# Patient Record
Sex: FEMALE | Race: WHITE | Employment: OTHER | URBAN - METROPOLITAN AREA
[De-identification: names, ages, dates, MRNs, and addresses within clinical notes are randomized per-mention and may not be internally consistent; named-entity substitution may affect disease eponyms.]

---

## 2017-01-03 DIAGNOSIS — E78.5 HYPERLIPIDEMIA, UNSPECIFIED HYPERLIPIDEMIA TYPE: ICD-10-CM

## 2017-01-03 DIAGNOSIS — I49.3 PVC (PREMATURE VENTRICULAR CONTRACTION): ICD-10-CM

## 2017-01-03 DIAGNOSIS — I42.9 CARDIOMYOPATHY (HCC): ICD-10-CM

## 2017-01-03 RX ORDER — MEXILETINE HYDROCHLORIDE 250 MG/1
250 CAPSULE ORAL 2 TIMES DAILY
Qty: 180 CAP | Refills: 1 | Status: SHIPPED | OUTPATIENT
Start: 2017-01-03 | End: 2017-04-12 | Stop reason: SDUPTHER

## 2017-01-03 RX ORDER — OMEGA-3-ACID ETHYL ESTERS 1 G/1
CAPSULE, LIQUID FILLED ORAL
Qty: 90 CAP | Refills: 1 | Status: SHIPPED | OUTPATIENT
Start: 2017-01-03 | End: 2017-03-07 | Stop reason: SDUPTHER

## 2017-01-03 RX ORDER — METOPROLOL SUCCINATE 25 MG/1
25 TABLET, EXTENDED RELEASE ORAL DAILY
Qty: 90 TAB | Refills: 1 | Status: SHIPPED | OUTPATIENT
Start: 2017-01-03 | End: 2017-03-07 | Stop reason: SDUPTHER

## 2017-01-03 RX ORDER — METFORMIN HYDROCHLORIDE 1000 MG/1
1000 TABLET ORAL 2 TIMES DAILY WITH MEALS
Qty: 180 TAB | Refills: 3 | Status: SHIPPED | OUTPATIENT
Start: 2017-01-03 | End: 2017-03-24 | Stop reason: SDUPTHER

## 2017-01-03 RX ORDER — ATORVASTATIN CALCIUM 10 MG/1
10 TABLET, FILM COATED ORAL
Qty: 90 TAB | Refills: 3 | Status: SHIPPED | OUTPATIENT
Start: 2017-01-03 | End: 2017-03-07 | Stop reason: SDUPTHER

## 2017-01-03 RX ORDER — POTASSIUM CHLORIDE 20 MEQ/1
30 TABLET, EXTENDED RELEASE ORAL 2 TIMES DAILY
Qty: 270 TAB | Refills: 3 | Status: SHIPPED | OUTPATIENT
Start: 2017-01-03 | End: 2017-06-22 | Stop reason: DRUGHIGH

## 2017-01-03 NOTE — TELEPHONE ENCOUNTER
Requested Prescriptions     Signed Prescriptions Disp Refills    potassium chloride (KLOR-CON M20) 20 mEq tablet 270 Tab 3     Sig: Take 1.5 Tabs by mouth two (2) times a day. Authorizing Provider: Ward Patricia     Ordering User: Lui Rodriguez atorvastatin (LIPITOR) 10 mg tablet 90 Tab 3     Sig: Take 1 Tab by mouth nightly. Authorizing Provider: Ward Patricia     Ordering User: Lui Rodriguez metFORMIN (GLUCOPHAGE) 1,000 mg tablet 180 Tab 3     Sig: Take 1 Tab by mouth two (2) times daily (with meals).      Authorizing Provider: Ward Patricia     Ordering User: Princess Allen

## 2017-01-03 NOTE — TELEPHONE ENCOUNTER
Requested Prescriptions     Signed Prescriptions Disp Refills    mexiletine (MEXITIL) 250 mg capsule 180 Cap 1     Sig: Take 1 Cap by mouth two (2) times a day. Authorizing Provider: Mary Mujica     Ordering User: Marce Portillo metoprolol succinate (TOPROL-XL) 25 mg XL tablet 90 Tab 1     Sig: Take 1 Tab by mouth daily.      Authorizing Provider: Mary Mujica     Ordering User: Cristy Lang

## 2017-01-03 NOTE — TELEPHONE ENCOUNTER
Requested Prescriptions     Signed Prescriptions Disp Refills    omega-3 acid ethyl esters (LOVAZA) 1 gram capsule 90 Cap 1     Sig: Three capsules by mouth daily.      Authorizing Provider: Reyes Mann     Ordering User: Julia Abad

## 2017-02-09 ENCOUNTER — OFFICE VISIT (OUTPATIENT)
Dept: CARDIOLOGY CLINIC | Age: 67
End: 2017-02-09

## 2017-02-09 VITALS
HEART RATE: 88 BPM | RESPIRATION RATE: 16 BRPM | HEIGHT: 64 IN | SYSTOLIC BLOOD PRESSURE: 144 MMHG | OXYGEN SATURATION: 98 % | DIASTOLIC BLOOD PRESSURE: 84 MMHG | BODY MASS INDEX: 29.5 KG/M2 | WEIGHT: 172.8 LBS

## 2017-02-09 DIAGNOSIS — E55.9 VITAMIN D DEFICIENCY: ICD-10-CM

## 2017-02-09 DIAGNOSIS — I10 ESSENTIAL HYPERTENSION: ICD-10-CM

## 2017-02-09 DIAGNOSIS — E78.2 ELEVATED TRIGLYCERIDES WITH HIGH CHOLESTEROL: ICD-10-CM

## 2017-02-09 DIAGNOSIS — I42.9 CARDIOMYOPATHY (HCC): ICD-10-CM

## 2017-02-09 DIAGNOSIS — R42 DIZZINESS: ICD-10-CM

## 2017-02-09 DIAGNOSIS — E88.81 INSULIN RESISTANCE: ICD-10-CM

## 2017-02-09 DIAGNOSIS — E78.2 MIXED HYPERLIPIDEMIA WITH APOLIPOPROTEIN E2 VARIANT: ICD-10-CM

## 2017-02-09 DIAGNOSIS — I49.3 PVC (PREMATURE VENTRICULAR CONTRACTION): Primary | ICD-10-CM

## 2017-02-09 RX ORDER — FENOFIBRATE 145 MG/1
145 TABLET, COATED ORAL DAILY
Qty: 90 TAB | Refills: 3 | Status: SHIPPED | OUTPATIENT
Start: 2017-02-09 | End: 2017-03-24 | Stop reason: SDUPTHER

## 2017-02-09 RX ORDER — LOSARTAN POTASSIUM 100 MG/1
100 TABLET ORAL
Qty: 90 TAB | Refills: 3 | Status: SHIPPED | OUTPATIENT
Start: 2017-02-09 | End: 2017-03-24 | Stop reason: SDUPTHER

## 2017-02-09 NOTE — PATIENT INSTRUCTIONS
I want you to check the TYPE of estrogen cream you have. If it is estradiol, then fine! Use it as directed. If it is synthetic (\"conjugated equine estrogen,\" for example), please call Dr. Alexys Juarez and ask her for bioidentical (estradiol) cream.    You might think about doing some yoga. I really like \"Ageless Yoga,\" which is a DVD series (you can get it on [employer]). Also, maybe give yourself a break and think about getting on your treadmill for 5 minutes a day. Just 5 minutes. Do this every day for 3 weeks and see how you feel. Please increase your Vitamin D to double what you're currently taking. My original recommendation was for 4000 daily in winter and spring, and 2000 daily in summer and fall. I am increasing your losartan. Please take TWO of your current pills at bedtime until you run out. Then, start your new prescription. When you use your new prescription, you will take ONE pill at bedtime.

## 2017-02-09 NOTE — PROGRESS NOTES
Stella Chand  is a 78 yo woman, last seen by me 8/16, and by Dr. Lauren Snow 9/16. She returns today for management of dyslipidemia. She had previously seen Dr. Antolin Blackwell for management of nonischemic dilated cardiomyopathy complicated by symptomatic PVCs and orthostatic dizziness. She drinks copious amounts of water and minimal caffeine. No dizziness. There is no complaint of chest pain, shortness of breath or palpitations. She reports no palpitations. Dr. Lauren Snow will potentially be discontinuing her mexiletine in the future. Ms. Vipul Guidry is now using a treadmill 3-4 days per week; 30-45 minutes each session. Diet is primarily healthy. Likes bread, but \"is trying to watch it. \"    At last visit, we discontinued her fenofibrate. Allergies   Allergen Reactions    Sulfa (Sulfonamide Antibiotics) Hives     Current Outpatient Prescriptions   Medication Sig Dispense Refill    omega-3 acid ethyl esters (LOVAZA) 1 gram capsule Three capsules by mouth daily. 90 Cap 1    mexiletine (MEXITIL) 250 mg capsule Take 1 Cap by mouth two (2) times a day. 180 Cap 1    metoprolol succinate (TOPROL-XL) 25 mg XL tablet Take 1 Tab by mouth daily. 90 Tab 1    potassium chloride (KLOR-CON M20) 20 mEq tablet Take 1.5 Tabs by mouth two (2) times a day. 270 Tab 3    atorvastatin (LIPITOR) 10 mg tablet Take 1 Tab by mouth nightly. 90 Tab 3    metFORMIN (GLUCOPHAGE) 1,000 mg tablet Take 1 Tab by mouth two (2) times daily (with meals). 180 Tab 3    losartan (COZAAR) 50 mg tablet Take 50 mg by mouth nightly. Indications: HYPERTENSION      aspirin delayed-release 81 mg tablet Take 162 mg by mouth nightly.  levothyroxine (SYNTHROID) 137 mcg tablet Take 137 mcg by mouth Daily (before breakfast).  cholecalciferol, VITAMIN D3, (VITAMIN D3) 5,000 unit tab tablet Take 5,000 Units by mouth daily.  folic acid (FOLVITE) 1 mg tablet Take 1 mg by mouth nightly.       sertraline (ZOLOFT) 100 mg tablet Take 100 mg by mouth daily.  raloxifene (EVISTA) 60 mg tablet Take 60 mg by mouth daily.  CYANOCOBALAMIN (VITAMIN B-12 IJ) by Injection route every fourteen (14) days. Social History    Smoking status: Never Smoker     Alcohol Use: Rarely     Past Medical History   Diagnosis Date    Autonomic dysfunction 12/4/98     tilt test with marked heart rate variablility and drop in BP without hemodynamic collapse    Cancer ([facility] 1997     left breast lumpectomy , Rx RT    Diabetes ([facility]      pre-diabetic, on metformin    Dilated cardiomyopathy ([facility] 2/8/06     nonischemic    Elevated triglycerides with high cholesterol 11/21/2014    Hypercholesterolemia     Hypothyroidism 1994     Dr. Marina Lopez.    Ill-defined condition      anxiety    Ill-defined condition      high cholesterol    Insulin resistance 11/21/2014    Mixed hyperlipidemia with apolipoprotein E2 variant 11/21/2014    Nausea & vomiting     Osteoarthritis      diffuse. Dr. Stoner An Other and unspecified hyperlipidemia 12/21/2010    Psychiatric disorder      anxiety    PVC's 1994    Sleep apnea      Not wearing CPAP    Vitamin D deficiency 11/21/2014     Cardiac testing:  Echo 7/27/07- Ef 40-45%, mild MR, mild TR  Echo 2/1/11- Ef 50%, mild MR, Mild TR  Echo 10/10/12- EF 50-55%, mild LAE, no significant changes when compared to previous study  Echo 10/19/13-EF 55 %,grade 1 diastolic dysfunction, mild LAE  LE artery duplex 9/2/14- Negative for DVT  Echo 11/21/14-EF 55 %, grade 1 diastolic dysfunction, mild LAE, mild MR, mild TR  Echo- 10/3/16: EF 60-65%, mild TR. PASP 29 mmHg. Visit Vitals    /84 (BP 1 Location: Right arm, BP Patient Position: Sitting)    Pulse 88    Resp 16    Ht 5' 4\" (1.626 m)    Wt 172 lb 12.8 oz (78.4 kg)    SpO2 98%    BMI 29.66 kg/m2     Extended / Orthostatic Vitals:         Wt Readings from Last 3 Encounters:   02/09/17 172 lb 12.8 oz (78.4 kg)   10/14/16 168 lb (76.2 kg)   09/29/16 170 lb 4.8 oz (77.2 kg)     Neck:  Supple, with no JVD. No bruits or thyroid abnl. Chest:  Clear to auscultation. CV:  Heart rate is regular with nondisplaced PMI, no appreciable murmur or gallop. Abd:  No tenderness, soft, obese. Ext:   No edema. Labs:  LDL-P 1121, Trig 304, sd LDL-P 850, LP-IR 73, HgA1C 5.8%, Vit. D 30.7    EKG: Sinus at 82 bpm, normal    Encounter Diagnoses   Name Primary?  PVC (premature ventricular contraction) Yes    Cardiomyopathy (HCC)     Dizziness     Mixed hyperlipidemia with apolipoprotein E2 variant     Elevated triglycerides with high cholesterol     Insulin resistance     Vitamin D deficiency     Essential hypertension      Ms. Jenelle Siddiqi is experiencing no palpitations; continues on metoprolol and mexiletine. Will defer to Dr. Za Hood regarding discontinuing mexiletine. In the meantime, her blood pressure is not well-controlled, so will Increase losartan to 100 mg qhs. Ms. Jenelle Siddiqi' lipids are significantly worse in the absence of fenofibrate, so will restart fenofibrate 145 mg daily. Continue atorvastatin and Lovaza. Insulin resistance is stable with metformin, but patient urged to begin a regular exercise program, as much to cope with stress as to improve insulin sensitivity. Recommended she try \"Ageless Yoga\" and short intervals on her treadmill at home daily. Vitamin D level is low; she was again advised to take 4000 international units daily in winter and spring and 2000 daily in summer and fall. Follow-up Disposition:  Return in about 6 months (around 8/9/2017).   Labs prior    Shane Orona, ANP

## 2017-02-09 NOTE — PROGRESS NOTES
Visit Vitals    /84 (BP 1 Location: Right arm, BP Patient Position: Sitting)    Pulse 88    Resp 16    Ht 5' 4\" (1.626 m)    Wt 172 lb 12.8 oz (78.4 kg)    SpO2 98%    BMI 29.66 kg/m2

## 2017-02-09 NOTE — MR AVS SNAPSHOT
Visit Information Date & Time Provider Department Dept. Phone Encounter #  
 2/9/2017  9:00 AM Varsha Richards NP [facility] Rickie Khalil [phone] [id number] Follow-up Instructions Return in about 6 months (around 8/9/2017). Your Appointments 4/24/2017 10:00 AM  
ESTABLISHED PATIENT with Pranav Musa MD  
[facility] Appt Note: annual  
 [address] [facility] [address] [facility]  
[phone]  
  
   
 [address] [facility] [address] [place] [address]  
  
    
 9/25/2017  9:00 AM  
ESTABLISHED PATIENT with Pranav Musa MD  
[facility] ([facility]) Appt Note: 1 yr fu appt  sll [address] [facility] [address] [facility]  
[phone] Upcoming Health Maintenance Date Due Hepatitis C Screening 1950 DTaP/Tdap/Td series (1 - Tdap) 3/9/1971 BREAST CANCER SCRN MAMMOGRAM 3/9/2000 FOBT Q 1 YEAR AGE 50-75 3/9/2000 ZOSTER VACCINE AGE 60> 3/9/2010 GLAUCOMA SCREENING Q2Y 3/9/2015 Pneumococcal 65+ Low/Medium Risk (1 of 2 - PCV13) 3/9/2015 MEDICARE YEARLY EXAM 3/9/2015 INFLUENZA AGE 9 TO ADULT 8/1/2016 Allergies as of 2/9/2017  Review Complete On: 2/9/2017 By: Varsha Richards NP Severity Noted Reaction Type Reactions Sulfa (Sulfonamide Antibiotics)  07/16/2010    Hives Current Immunizations  Never Reviewed No immunizations on file. Not reviewed this visit You Were Diagnosed With   
  
 Codes Comments PVC (premature ventricular contraction)    -  Primary ICD-10-CM: I49.3 ICD-9-CM: 427.69 Cardiomyopathy ([facility]     ICD-10-CM: I42.9 ICD-9-CM: 425.4 Dizziness     ICD-10-CM: O32 ICD-9-CM: 780.4 Mixed hyperlipidemia with apolipoprotein E2 variant     ICD-10-CM: E78.2 ICD-9-CM: 272.2 Elevated triglycerides with high cholesterol     ICD-10-CM: E78.2 ICD-9-CM: 272.2 Insulin resistance     ICD-10-CM: P17.69 ICD-9-CM: 277.7 Vitamin D deficiency     ICD-10-CM: E55.9 ICD-9-CM: 268.9 Essential hypertension     ICD-10-CM: I10 
ICD-9-CM: 401.9 Vitals BP Pulse Resp Height(growth percentile) Weight(growth percentile) SpO2  
 144/84 (BP 1 Location: Right arm, BP Patient Position: Sitting) 88 16 5' 4\" (1.626 m) 172 lb 12.8 oz (78.4 kg) 98% BMI OB Status Smoking Status 29.66 kg/m2 Hysterectomy Never Smoker Vitals History BMI and BSA Data Body Mass Index Body Surface Area  
 29.66 kg/m 2 1.88 m 2 Preferred Pharmacy Pharmacy Name Phone  [address] [phone] Your Updated Medication List  
  
   
This list is accurate as of: 2/9/17 10:10 AM.  Always use your most recent med list.  
  
  
  
  
 aspirin delayed-release 81 mg tablet Take 162 mg by mouth nightly. atorvastatin 10 mg tablet Commonly known as:  LIPITOR Take 1 Tab by mouth nightly. cholecalciferol (VITAMIN D3) 5,000 unit Tab tablet Commonly known as:  VITAMIN D3 Take 5,000 Units by mouth daily. EVISTA 60 mg tablet Generic drug:  raloxifene Take 60 mg by mouth daily. fenofibrate nanocrystallized 145 mg tablet Commonly known as:  [employer] Take 1 Tab by mouth daily. folic acid 1 mg tablet Commonly known as:  [employer] Take 1 mg by mouth nightly. levothyroxine 137 mcg tablet Commonly known as:  SYNTHROID Take 137 mcg by mouth Daily (before breakfast). losartan 100 mg tablet Commonly known as:  COZAAR Take 1 Tab by mouth nightly. Indications: hypertension  
  
 metFORMIN 1,000 mg tablet Commonly known as:  GLUCOPHAGE Take 1 Tab by mouth two (2) times daily (with meals). metoprolol succinate 25 mg XL tablet Commonly known as:  TOPROL-XL Take 1 Tab by mouth daily. mexiletine 250 mg capsule Commonly known as:  MEXITIL Take 1 Cap by mouth two (2) times a day. omega-3 acid ethyl esters 1 gram capsule Commonly known as:  Renetta Keys Three capsules by mouth daily. potassium chloride 20 mEq tablet Commonly known as:  KLOR-CON M20 Take 1.5 Tabs by mouth two (2) times a day. VITAMIN B-12 INJECTION  
by Injection route every fourteen (14) days. ZOLOFT 100 mg tablet Generic drug:  sertraline Take 100 mg by mouth daily. Prescriptions Sent to Pharmacy Refills  
 fenofibrate nanocrystallized (TRICOR) 145 mg tablet 3 Sig: Take 1 Tab by mouth daily. Class: Normal  
 Pharmacy: [place] [address] Ph #: [phone] Route: Oral  
 losartan (COZAAR) 100 mg tablet 3 Sig: Take 1 Tab by mouth nightly. Indications: hypertension Class: Normal  
 Pharmacy: [place] [address] Ph #: [phone] Route: Oral  
  
We Performed the Following AMB POC EKG ROUTINE W/ 12 LEADS, INTER & REP [22958 CPT(R)] HEMOGLOBIN A1C W/O EAG [37333 CPT(R)] METABOLIC PANEL, COMPREHENSIVE [82001 CPT(R)] NMR LIPOPROFILE O466106 CPT(R)] Follow-up Instructions Return in about 6 months (around 8/9/2017). To-Do List   
 07/09/2017 Lab:  VITAMIN D, 25 HYDROXY Patient Instructions I want you to check the TYPE of estrogen cream you have. If it is estradiol, then fine! Use it as directed. If it is synthetic (\"conjugated equine estrogen,\" for example), please call Dr. Brynn Ramos and ask her for bioidentical (estradiol) cream. 
 
You might think about doing some yoga. I really like \"Ageless Yoga,\" which is a DVD series (you can get it on [employer]). Also, maybe give yourself a break and think about getting on your treadmill for 5 minutes a day. Just 5 minutes. Do this every day for 3 weeks and see how you feel. Please increase your Vitamin D to double what you're currently taking.  My original recommendation was for 4000 daily in winter and spring, and 2000 daily in summer and fall. I am increasing your losartan. Please take TWO of your current pills at bedtime until you run out. Then, start your new prescription. When you use your new prescription, you will take ONE pill at bedtime. Introducing [facility]! Dear Darren Stewart: Thank you for requesting a Swogo account. Our records indicate that you already have an active Swogo account. You can access your account anytime at [email or web address] Did you know that you can access your hospital and ER discharge instructions at any time in Swogo? You can also review all of your test results from your hospital stay or ER visit. Additional Information If you have questions, please visit the Frequently Asked Questions section of the Swogo website at [email or web address] Remember, Swogo is NOT to be used for urgent needs. For medical emergencies, dial 911. Now available from your iPhone and Android! Please provide this summary of care documentation to your next provider. Your primary care clinician is listed as Sol Weir. If you have any questions after today's visit, please call [phone].

## 2017-03-07 DIAGNOSIS — E78.5 HYPERLIPIDEMIA, UNSPECIFIED HYPERLIPIDEMIA TYPE: ICD-10-CM

## 2017-03-07 DIAGNOSIS — I42.9 CARDIOMYOPATHY (HCC): ICD-10-CM

## 2017-03-07 DIAGNOSIS — I49.3 PVC (PREMATURE VENTRICULAR CONTRACTION): ICD-10-CM

## 2017-03-07 RX ORDER — METOPROLOL SUCCINATE 25 MG/1
25 TABLET, EXTENDED RELEASE ORAL DAILY
Qty: 90 TAB | Refills: 1 | Status: SHIPPED | OUTPATIENT
Start: 2017-03-07 | End: 2017-03-28 | Stop reason: SDUPTHER

## 2017-03-07 RX ORDER — OMEGA-3-ACID ETHYL ESTERS 1 G/1
CAPSULE, LIQUID FILLED ORAL
Qty: 90 CAP | Refills: 1 | Status: SHIPPED | OUTPATIENT
Start: 2017-03-07 | End: 2017-04-12 | Stop reason: SDUPTHER

## 2017-03-07 RX ORDER — ATORVASTATIN CALCIUM 10 MG/1
10 TABLET, FILM COATED ORAL
Qty: 90 TAB | Refills: 1 | Status: SHIPPED | OUTPATIENT
Start: 2017-03-07 | End: 2017-03-24 | Stop reason: SDUPTHER

## 2017-03-07 NOTE — TELEPHONE ENCOUNTER
From: Percy Cisneros  To:  Lucille Daly MD  Sent: 3/7/2017 10:13 AM EST  Subject: Medication Renewal Request    Original authorizing provider: Lucille Daly MD    Percy Lakhani would like a refill of the following medications:  metoprolol succinate (TOPROL-XL) 25 mg XL tablet Lucille Daly MD]    Preferred pharmacy: [address] [place]    Comment:      Medication renewals requested in this message routed to other providers:  omega-3 acid ethyl esters (LOVAZA) 1 gram capsule [Aylin Garcia NP]  atorvastatin (LIPITOR) 10 mg tablet [Aylin Garcia NP]  fenofibrate nanocrystallized (TRICOR) 145 mg tablet [Aylin Garcia NP]

## 2017-03-08 ENCOUNTER — HOSPITAL ENCOUNTER (OUTPATIENT)
Dept: GENERAL RADIOLOGY | Age: 67
Discharge: HOME | End: 2017-03-08
Attending: SPECIALIST
Payer: MEDICARE

## 2017-03-08 DIAGNOSIS — K58.9 IBS (IRRITABLE BOWEL SYNDROME): ICD-10-CM

## 2017-03-08 DIAGNOSIS — R19.7 DIARRHEA, UNSPECIFIED: ICD-10-CM

## 2017-03-08 PROCEDURE — 74250 X-RAY XM SM INT 1CNTRST STD: CPT

## 2017-03-24 DIAGNOSIS — I49.3 PVC (PREMATURE VENTRICULAR CONTRACTION): ICD-10-CM

## 2017-03-24 DIAGNOSIS — E55.9 VITAMIN D DEFICIENCY: ICD-10-CM

## 2017-03-24 DIAGNOSIS — E88.81 INSULIN RESISTANCE: ICD-10-CM

## 2017-03-24 DIAGNOSIS — I42.9 CARDIOMYOPATHY (HCC): ICD-10-CM

## 2017-03-24 DIAGNOSIS — E78.2 MIXED HYPERLIPIDEMIA WITH APOLIPOPROTEIN E2 VARIANT: ICD-10-CM

## 2017-03-24 DIAGNOSIS — I10 ESSENTIAL HYPERTENSION: ICD-10-CM

## 2017-03-24 DIAGNOSIS — R42 DIZZINESS: ICD-10-CM

## 2017-03-24 DIAGNOSIS — E78.2 ELEVATED TRIGLYCERIDES WITH HIGH CHOLESTEROL: ICD-10-CM

## 2017-03-24 DIAGNOSIS — E78.5 HYPERLIPIDEMIA, UNSPECIFIED HYPERLIPIDEMIA TYPE: ICD-10-CM

## 2017-03-24 RX ORDER — METOPROLOL SUCCINATE 25 MG/1
25 TABLET, EXTENDED RELEASE ORAL DAILY
Qty: 90 TAB | Refills: 1 | Status: CANCELLED | OUTPATIENT
Start: 2017-03-24

## 2017-03-27 RX ORDER — FENOFIBRATE 145 MG/1
145 TABLET, COATED ORAL DAILY
Qty: 90 TAB | Refills: 3 | Status: SHIPPED | OUTPATIENT
Start: 2017-03-27 | End: 2017-04-12 | Stop reason: SDUPTHER

## 2017-03-27 RX ORDER — METFORMIN HYDROCHLORIDE 1000 MG/1
1000 TABLET ORAL 2 TIMES DAILY WITH MEALS
Qty: 180 TAB | Refills: 3 | Status: SHIPPED | OUTPATIENT
Start: 2017-03-27 | End: 2017-04-12 | Stop reason: SDUPTHER

## 2017-03-27 RX ORDER — LOSARTAN POTASSIUM 100 MG/1
100 TABLET ORAL
Qty: 90 TAB | Refills: 3 | Status: SHIPPED | OUTPATIENT
Start: 2017-03-27 | End: 2017-04-12 | Stop reason: SDUPTHER

## 2017-03-27 NOTE — TELEPHONE ENCOUNTER
From: Percy Cisneros  To:  Petra Ferrari NP  Sent: 3/24/2017 9:01 PM EDT  Subject: Medication Renewal Request    Original authorizing provider: LUIS Pruitt Batsheva Strickland would like a refill of the following medications:  metFORMIN (GLUCOPHAGE) 1,000 mg tablet [Aylin Garcia NP]  fenofibrate nanocrystallized (TRICOR) 145 mg tablet [Aylin Garcia NP]  losartan (COZAAR) 100 mg tablet [Aylin Garcia NP]    Preferred pharmacy: [address]    Comment:      Medication renewals requested in this message routed to other providers:  metoprolol succinate (TOPROL-XL) 25 mg XL tablet Costa Adams MD]  atorvastatin (LIPITOR) 10 mg tablet Costa Adams MD]

## 2017-03-28 RX ORDER — ATORVASTATIN CALCIUM 10 MG/1
10 TABLET, FILM COATED ORAL
Qty: 90 TAB | Refills: 1 | Status: SHIPPED | OUTPATIENT
Start: 2017-03-28 | End: 2017-04-12 | Stop reason: SDUPTHER

## 2017-03-28 RX ORDER — METOPROLOL SUCCINATE 25 MG/1
25 TABLET, EXTENDED RELEASE ORAL DAILY
Qty: 90 TAB | Refills: 1 | Status: SHIPPED | OUTPATIENT
Start: 2017-03-28 | End: 2017-04-12 | Stop reason: SDUPTHER

## 2017-03-28 NOTE — TELEPHONE ENCOUNTER
From: Percy Cisneros  To:  Tha Manriquez MD  Sent: 3/24/2017 9:01 PM EDT  Subject: Medication Renewal Request    Original authorizing provider: Tha Manriquez MD Dedtia Swain would like a refill of the following medications:  metoprolol succinate (TOPROL-XL) 25 mg XL tablet Tha Manriquez MD]  atorvastatin (LIPITOR) 10 mg tablet Tha Manriquez MD]    Preferred pharmacy: [address]    Comment:      Medication renewals requested in this message routed to other providers:  metFORMIN (GLUCOPHAGE) 1,000 mg tablet [Aylin Garcia NP]  fenofibrate nanocrystallized (TRICOR) 145 mg tablet [Aylin Garcia NP]  losartan (COZAAR) 100 mg tablet [Aylin Garcia NP]

## 2017-04-12 DIAGNOSIS — I49.3 PVC (PREMATURE VENTRICULAR CONTRACTION): ICD-10-CM

## 2017-04-12 DIAGNOSIS — E78.2 MIXED HYPERLIPIDEMIA WITH APOLIPOPROTEIN E2 VARIANT: ICD-10-CM

## 2017-04-12 DIAGNOSIS — E78.5 HYPERLIPIDEMIA, UNSPECIFIED HYPERLIPIDEMIA TYPE: ICD-10-CM

## 2017-04-12 DIAGNOSIS — E88.81 INSULIN RESISTANCE: ICD-10-CM

## 2017-04-12 DIAGNOSIS — I10 ESSENTIAL HYPERTENSION: ICD-10-CM

## 2017-04-12 DIAGNOSIS — I42.9 CARDIOMYOPATHY (HCC): ICD-10-CM

## 2017-04-12 DIAGNOSIS — E78.2 ELEVATED TRIGLYCERIDES WITH HIGH CHOLESTEROL: ICD-10-CM

## 2017-04-12 DIAGNOSIS — R42 DIZZINESS: ICD-10-CM

## 2017-04-12 DIAGNOSIS — E55.9 VITAMIN D DEFICIENCY: ICD-10-CM

## 2017-04-12 RX ORDER — ATORVASTATIN CALCIUM 10 MG/1
10 TABLET, FILM COATED ORAL
Qty: 90 TAB | Refills: 1 | Status: SHIPPED | OUTPATIENT
Start: 2017-04-12 | End: 2017-05-30 | Stop reason: SDUPTHER

## 2017-04-12 RX ORDER — MEXILETINE HYDROCHLORIDE 250 MG/1
250 CAPSULE ORAL 2 TIMES DAILY
Qty: 180 CAP | Refills: 1 | Status: SHIPPED | OUTPATIENT
Start: 2017-04-12 | End: 2017-05-30 | Stop reason: SDUPTHER

## 2017-04-12 RX ORDER — METFORMIN HYDROCHLORIDE 1000 MG/1
1000 TABLET ORAL 2 TIMES DAILY WITH MEALS
Qty: 180 TAB | Refills: 3 | Status: SHIPPED | OUTPATIENT
Start: 2017-04-12 | End: 2017-05-03 | Stop reason: SDUPTHER

## 2017-04-12 RX ORDER — LOSARTAN POTASSIUM 100 MG/1
100 TABLET ORAL
Qty: 90 TAB | Refills: 3 | Status: SHIPPED | OUTPATIENT
Start: 2017-04-12 | End: 2017-05-03 | Stop reason: SDUPTHER

## 2017-04-12 RX ORDER — METOPROLOL SUCCINATE 25 MG/1
25 TABLET, EXTENDED RELEASE ORAL DAILY
Qty: 90 TAB | Refills: 1 | Status: SHIPPED | OUTPATIENT
Start: 2017-04-12 | End: 2017-04-26 | Stop reason: SDUPTHER

## 2017-04-12 RX ORDER — OMEGA-3-ACID ETHYL ESTERS 1 G/1
CAPSULE, LIQUID FILLED ORAL
Qty: 90 CAP | Refills: 1 | Status: SHIPPED | OUTPATIENT
Start: 2017-04-12 | End: 2018-03-26

## 2017-04-12 RX ORDER — FENOFIBRATE 145 MG/1
145 TABLET, COATED ORAL DAILY
Qty: 90 TAB | Refills: 3 | Status: SHIPPED | OUTPATIENT
Start: 2017-04-12 | End: 2017-06-20 | Stop reason: SDUPTHER

## 2017-04-12 NOTE — TELEPHONE ENCOUNTER
Requested Prescriptions     Signed Prescriptions Disp Refills    metFORMIN (GLUCOPHAGE) 1,000 mg tablet 180 Tab 3     Sig: Take 1 Tab by mouth two (2) times daily (with meals). Authorizing Provider: Elysia Winchester     Ordering User: Keyshawn Reaves fenofibrate nanocrystallized (TRICOR) 145 mg tablet 90 Tab 3     Sig: Take 1 Tab by mouth daily. Authorizing Provider: Elysia Winchester     Ordering User: Roxi Segura    losartan (COZAAR) 100 mg tablet 90 Tab 3     Sig: Take 1 Tab by mouth nightly.  Indications: hypertension     Authorizing Provider: Elysia Doty User: Roxi Segura

## 2017-04-12 NOTE — TELEPHONE ENCOUNTER
Requested Prescriptions     Signed Prescriptions Disp Refills    mexiletine (MEXITIL) 250 mg capsule 180 Cap 1     Sig: Take 1 Cap by mouth two (2) times a day. Authorizing Provider: Viri Chopra     Ordering User: Chantelle Aceves    omega-3 acid ethyl esters (LOVAZA) 1 gram capsule 90 Cap 1     Sig: Three capsules by mouth daily. Authorizing Provider: Viri Chopra     Ordering User: Chantelle Aceves    atorvastatin (LIPITOR) 10 mg tablet 90 Tab 1     Sig: Take 1 Tab by mouth nightly. Authorizing Provider: Viri Chopra     Ordering User: Ludin Martinez metoprolol succinate (TOPROL-XL) 25 mg XL tablet 90 Tab 1     Sig: Take 1 Tab by mouth daily.      Authorizing Provider: Viri Chopra     Ordering User: Chantelle Aceves

## 2017-04-26 DIAGNOSIS — I42.8 OTHER CARDIOMYOPATHY (HCC): ICD-10-CM

## 2017-04-26 DIAGNOSIS — I49.3 PVC (PREMATURE VENTRICULAR CONTRACTION): ICD-10-CM

## 2017-04-26 RX ORDER — METOPROLOL SUCCINATE 25 MG/1
25 TABLET, EXTENDED RELEASE ORAL DAILY
Qty: 90 TAB | Refills: 1 | Status: SHIPPED | OUTPATIENT
Start: 2017-04-26 | End: 2017-05-30 | Stop reason: SDUPTHER

## 2017-05-03 DIAGNOSIS — E78.2 ELEVATED TRIGLYCERIDES WITH HIGH CHOLESTEROL: ICD-10-CM

## 2017-05-03 DIAGNOSIS — I25.5 ISCHEMIC CARDIOMYOPATHY: ICD-10-CM

## 2017-05-03 DIAGNOSIS — E78.2 MIXED HYPERLIPIDEMIA WITH APOLIPOPROTEIN E2 VARIANT: ICD-10-CM

## 2017-05-03 DIAGNOSIS — I49.3 PVC (PREMATURE VENTRICULAR CONTRACTION): ICD-10-CM

## 2017-05-03 DIAGNOSIS — E55.9 VITAMIN D DEFICIENCY: ICD-10-CM

## 2017-05-03 DIAGNOSIS — R42 DIZZINESS: ICD-10-CM

## 2017-05-03 DIAGNOSIS — E88.81 INSULIN RESISTANCE: ICD-10-CM

## 2017-05-03 DIAGNOSIS — I10 ESSENTIAL HYPERTENSION: ICD-10-CM

## 2017-05-03 RX ORDER — METFORMIN HYDROCHLORIDE 1000 MG/1
1000 TABLET ORAL 2 TIMES DAILY WITH MEALS
Qty: 180 TAB | Refills: 3 | Status: SHIPPED | OUTPATIENT
Start: 2017-05-03 | End: 2017-05-05 | Stop reason: SDUPTHER

## 2017-05-03 RX ORDER — LOSARTAN POTASSIUM 100 MG/1
100 TABLET ORAL
Qty: 90 TAB | Refills: 3 | Status: SHIPPED | OUTPATIENT
Start: 2017-05-03 | End: 2017-05-05 | Stop reason: DRUGHIGH

## 2017-05-05 RX ORDER — METFORMIN HYDROCHLORIDE 1000 MG/1
1000 TABLET ORAL 2 TIMES DAILY WITH MEALS
Qty: 180 TAB | Refills: 3 | Status: SHIPPED | OUTPATIENT
Start: 2017-05-05 | End: 2017-05-17 | Stop reason: SDUPTHER

## 2017-05-05 RX ORDER — LOSARTAN POTASSIUM 50 MG/1
50 TABLET ORAL DAILY
Qty: 90 TAB | Refills: 3 | Status: SHIPPED | OUTPATIENT
Start: 2017-05-05 | End: 2017-05-30 | Stop reason: SDUPTHER

## 2017-05-05 RX ORDER — LOSARTAN POTASSIUM 50 MG/1
TABLET ORAL DAILY
COMMUNITY
End: 2017-05-05 | Stop reason: SDUPTHER

## 2017-05-17 RX ORDER — METFORMIN HYDROCHLORIDE 1000 MG/1
1000 TABLET ORAL 2 TIMES DAILY WITH MEALS
Qty: 180 TAB | Refills: 3 | Status: SHIPPED | OUTPATIENT
Start: 2017-05-17 | End: 2017-05-30 | Stop reason: SDUPTHER

## 2017-05-30 DIAGNOSIS — I42.8 OTHER CARDIOMYOPATHY (HCC): ICD-10-CM

## 2017-05-30 DIAGNOSIS — I42.9 CARDIOMYOPATHY (HCC): ICD-10-CM

## 2017-05-30 DIAGNOSIS — I49.3 PVC (PREMATURE VENTRICULAR CONTRACTION): ICD-10-CM

## 2017-05-30 DIAGNOSIS — E78.5 HYPERLIPIDEMIA, UNSPECIFIED HYPERLIPIDEMIA TYPE: ICD-10-CM

## 2017-05-31 RX ORDER — MEXILETINE HYDROCHLORIDE 250 MG/1
250 CAPSULE ORAL 2 TIMES DAILY
Qty: 180 CAP | Refills: 0 | Status: SHIPPED | OUTPATIENT
Start: 2017-05-31 | End: 2017-06-20 | Stop reason: SDUPTHER

## 2017-05-31 RX ORDER — ATORVASTATIN CALCIUM 10 MG/1
10 TABLET, FILM COATED ORAL
Qty: 90 TAB | Refills: 0 | Status: SHIPPED | OUTPATIENT
Start: 2017-05-31 | End: 2017-08-20 | Stop reason: SDUPTHER

## 2017-05-31 RX ORDER — METFORMIN HYDROCHLORIDE 1000 MG/1
1000 TABLET ORAL 2 TIMES DAILY WITH MEALS
Qty: 180 TAB | Refills: 0 | Status: SHIPPED | OUTPATIENT
Start: 2017-05-31 | End: 2017-06-20 | Stop reason: SDUPTHER

## 2017-05-31 RX ORDER — METOPROLOL SUCCINATE 25 MG/1
25 TABLET, EXTENDED RELEASE ORAL DAILY
Qty: 90 TAB | Refills: 0 | Status: SHIPPED | OUTPATIENT
Start: 2017-05-31 | End: 2017-06-20 | Stop reason: SDUPTHER

## 2017-05-31 RX ORDER — LOSARTAN POTASSIUM 50 MG/1
50 TABLET ORAL DAILY
Qty: 90 TAB | Refills: 0 | Status: SHIPPED | OUTPATIENT
Start: 2017-05-31 | End: 2017-06-20 | Stop reason: SDUPTHER

## 2017-05-31 NOTE — TELEPHONE ENCOUNTER
From: Percy Cisneros  To:  Sarah Clarke MD  Sent: 5/30/2017 1:27 PM EDT  Subject: Medication Renewal Request    Original authorizing provider: MD Percy Newell would like a refill of the following medications:  mexiletine (MEXITIL) 250 mg capsule Sarah Clarke MD]  atorvastatin (LIPITOR) 10 mg tablet Sarah Clarke MD]  metoprolol succinate (TOPROL-XL) 25 mg XL tablet Sarah Clarke MD]  metFORMIN (GLUCOPHAGE) 1,000 mg tablet Sarah Clarke MD]    Preferred pharmacy: [address] [place]    Comment:      Medication renewals requested in this message routed to other providers:  losartan (COZAAR) 50 mg tablet [Aylin Garcia NP]

## 2017-06-20 DIAGNOSIS — E55.9 VITAMIN D DEFICIENCY: ICD-10-CM

## 2017-06-20 DIAGNOSIS — I49.3 PVC (PREMATURE VENTRICULAR CONTRACTION): ICD-10-CM

## 2017-06-20 DIAGNOSIS — E78.2 MIXED HYPERLIPIDEMIA WITH APOLIPOPROTEIN E2 VARIANT: ICD-10-CM

## 2017-06-20 DIAGNOSIS — I25.5 ISCHEMIC CARDIOMYOPATHY: ICD-10-CM

## 2017-06-20 DIAGNOSIS — R42 DIZZINESS: ICD-10-CM

## 2017-06-20 DIAGNOSIS — I42.8 OTHER CARDIOMYOPATHY (HCC): ICD-10-CM

## 2017-06-20 DIAGNOSIS — I10 ESSENTIAL HYPERTENSION: ICD-10-CM

## 2017-06-20 DIAGNOSIS — E88.81 INSULIN RESISTANCE: ICD-10-CM

## 2017-06-20 DIAGNOSIS — E78.2 ELEVATED TRIGLYCERIDES WITH HIGH CHOLESTEROL: ICD-10-CM

## 2017-06-20 RX ORDER — LOSARTAN POTASSIUM 50 MG/1
50 TABLET ORAL DAILY
Qty: 90 TAB | Refills: 3 | Status: SHIPPED | OUTPATIENT
Start: 2017-06-20 | End: 2018-02-01 | Stop reason: SDUPTHER

## 2017-06-20 RX ORDER — METFORMIN HYDROCHLORIDE 1000 MG/1
1000 TABLET ORAL 2 TIMES DAILY WITH MEALS
Qty: 180 TAB | Refills: 3 | Status: SHIPPED | OUTPATIENT
Start: 2017-06-20 | End: 2017-06-22 | Stop reason: DRUGHIGH

## 2017-06-20 RX ORDER — MEXILETINE HYDROCHLORIDE 250 MG/1
250 CAPSULE ORAL 2 TIMES DAILY
Qty: 180 CAP | Refills: 3 | Status: SHIPPED | OUTPATIENT
Start: 2017-06-20 | End: 2018-03-01

## 2017-06-20 RX ORDER — FENOFIBRATE 145 MG/1
145 TABLET, COATED ORAL DAILY
Qty: 90 TAB | Refills: 3 | Status: SHIPPED | OUTPATIENT
Start: 2017-06-20 | End: 2018-02-01 | Stop reason: SDUPTHER

## 2017-06-20 RX ORDER — METOPROLOL SUCCINATE 25 MG/1
25 TABLET, EXTENDED RELEASE ORAL DAILY
Qty: 90 TAB | Refills: 3 | Status: SHIPPED | OUTPATIENT
Start: 2017-06-20 | End: 2018-02-01 | Stop reason: SDUPTHER

## 2017-06-22 ENCOUNTER — OFFICE VISIT (OUTPATIENT)
Dept: CARDIOLOGY CLINIC | Age: 67
End: 2017-06-22

## 2017-06-22 VITALS
RESPIRATION RATE: 16 BRPM | OXYGEN SATURATION: 98 % | SYSTOLIC BLOOD PRESSURE: 118 MMHG | HEIGHT: 64 IN | BODY MASS INDEX: 29.43 KG/M2 | DIASTOLIC BLOOD PRESSURE: 74 MMHG | HEART RATE: 84 BPM | WEIGHT: 172.4 LBS

## 2017-06-22 DIAGNOSIS — Z86.79 HISTORY OF VENTRICULAR TACHYCARDIA: ICD-10-CM

## 2017-06-22 DIAGNOSIS — I49.3 PVC (PREMATURE VENTRICULAR CONTRACTION): Primary | ICD-10-CM

## 2017-06-22 DIAGNOSIS — Z86.79 HX OF CARDIOMYOPATHY: ICD-10-CM

## 2017-06-22 DIAGNOSIS — I10 ESSENTIAL HYPERTENSION: ICD-10-CM

## 2017-06-22 RX ORDER — POTASSIUM CHLORIDE 1500 MG/1
TABLET, FILM COATED, EXTENDED RELEASE ORAL DAILY
COMMUNITY
End: 2017-11-27 | Stop reason: SDUPTHER

## 2017-06-22 RX ORDER — METFORMIN HYDROCHLORIDE 1000 MG/1
2000 TABLET ORAL 2 TIMES DAILY WITH MEALS
COMMUNITY
End: 2017-08-22 | Stop reason: SDUPTHER

## 2017-06-30 PROBLEM — Z86.79 HISTORY OF VENTRICULAR TACHYCARDIA: Status: ACTIVE | Noted: 2017-06-30

## 2017-07-09 DIAGNOSIS — E78.2 ELEVATED TRIGLYCERIDES WITH HIGH CHOLESTEROL: ICD-10-CM

## 2017-07-09 DIAGNOSIS — R42 DIZZINESS: ICD-10-CM

## 2017-07-09 DIAGNOSIS — E88.81 INSULIN RESISTANCE: ICD-10-CM

## 2017-07-09 DIAGNOSIS — I42.9 CARDIOMYOPATHY (HCC): ICD-10-CM

## 2017-07-09 DIAGNOSIS — I10 ESSENTIAL HYPERTENSION: ICD-10-CM

## 2017-07-09 DIAGNOSIS — I49.3 PVC (PREMATURE VENTRICULAR CONTRACTION): ICD-10-CM

## 2017-07-09 DIAGNOSIS — E78.2 MIXED HYPERLIPIDEMIA WITH APOLIPOPROTEIN E2 VARIANT: ICD-10-CM

## 2017-07-09 DIAGNOSIS — E55.9 VITAMIN D DEFICIENCY: ICD-10-CM

## 2017-08-20 DIAGNOSIS — E78.5 HYPERLIPIDEMIA, UNSPECIFIED HYPERLIPIDEMIA TYPE: ICD-10-CM

## 2017-08-21 RX ORDER — ATORVASTATIN CALCIUM 10 MG/1
TABLET, FILM COATED ORAL
Qty: 90 TAB | Refills: 0 | Status: SHIPPED | OUTPATIENT
Start: 2017-08-21 | End: 2017-11-20 | Stop reason: SDUPTHER

## 2017-08-21 NOTE — TELEPHONE ENCOUNTER
Refill is per verbal order of Dr. Sirisha Early.     Requested Prescriptions     Pending Prescriptions Disp Refills    atorvastatin (LIPITOR) 10 mg tablet [Pharmacy Med Name: ATORVASTATIN TAB 10MG] 90 Tab 0     Sig: TAKE 1 TABLET NIGHTLY

## 2017-08-22 DIAGNOSIS — Z86.79 HX OF CARDIOMYOPATHY: ICD-10-CM

## 2017-08-22 DIAGNOSIS — I49.3 PVC (PREMATURE VENTRICULAR CONTRACTION): ICD-10-CM

## 2017-08-22 RX ORDER — METFORMIN HYDROCHLORIDE 1000 MG/1
2000 TABLET ORAL 2 TIMES DAILY WITH MEALS
Qty: 180 TAB | Refills: 0 | Status: SHIPPED | OUTPATIENT
Start: 2017-08-22 | End: 2017-08-24 | Stop reason: SDUPTHER

## 2017-08-24 DIAGNOSIS — Z86.79 HX OF CARDIOMYOPATHY: ICD-10-CM

## 2017-08-24 DIAGNOSIS — I49.3 PVC (PREMATURE VENTRICULAR CONTRACTION): ICD-10-CM

## 2017-08-24 RX ORDER — METFORMIN HYDROCHLORIDE 1000 MG/1
2000 TABLET ORAL 2 TIMES DAILY WITH MEALS
Qty: 360 TAB | Refills: 3 | Status: SHIPPED | OUTPATIENT
Start: 2017-08-24 | End: 2017-08-28 | Stop reason: DRUGHIGH

## 2017-08-28 RX ORDER — METFORMIN HYDROCHLORIDE 1000 MG/1
1000 TABLET ORAL 2 TIMES DAILY WITH MEALS
Qty: 180 TAB | Refills: 3 | Status: SHIPPED | OUTPATIENT
Start: 2017-08-28 | End: 2018-02-01 | Stop reason: SDUPTHER

## 2017-08-28 RX ORDER — METFORMIN HYDROCHLORIDE 1000 MG/1
1000 TABLET ORAL 2 TIMES DAILY WITH MEALS
COMMUNITY
End: 2017-08-28 | Stop reason: SDUPTHER

## 2017-08-30 DIAGNOSIS — I49.3 PVC (PREMATURE VENTRICULAR CONTRACTION): ICD-10-CM

## 2017-08-30 DIAGNOSIS — I42.9 CARDIOMYOPATHY (HCC): ICD-10-CM

## 2017-08-30 RX ORDER — MEXILETINE HYDROCHLORIDE 250 MG/1
CAPSULE ORAL
Qty: 180 CAP | Refills: 0 | Status: SHIPPED | OUTPATIENT
Start: 2017-08-30 | End: 2017-11-27 | Stop reason: SDUPTHER

## 2017-11-07 ENCOUNTER — TELEPHONE (OUTPATIENT)
Dept: CARDIOLOGY CLINIC | Age: 67
End: 2017-11-07

## 2017-11-07 NOTE — TELEPHONE ENCOUNTER
LM to call back [phone]. WHen pt calls back please tell her  2pm stress test and 3pm echo on 11/8/17. Informed pt no caffiene after bedtime on 11/7/17, NPO after 12pm today, and DO NOT take Toprol in morning. Encouraged pt to wear comfortable clothes.

## 2017-11-08 ENCOUNTER — CLINICAL SUPPORT (OUTPATIENT)
Dept: CARDIOLOGY CLINIC | Age: 67
End: 2017-11-08

## 2017-11-08 DIAGNOSIS — I10 ESSENTIAL HYPERTENSION: ICD-10-CM

## 2017-11-08 DIAGNOSIS — Z86.79 HISTORY OF VENTRICULAR TACHYCARDIA: ICD-10-CM

## 2017-11-08 DIAGNOSIS — E78.2 MIXED HYPERLIPIDEMIA WITH APOLIPOPROTEIN E2 VARIANT: ICD-10-CM

## 2017-11-08 DIAGNOSIS — R42 DIZZINESS: ICD-10-CM

## 2017-11-08 DIAGNOSIS — I49.3 PVC (PREMATURE VENTRICULAR CONTRACTION): Primary | ICD-10-CM

## 2017-11-08 DIAGNOSIS — Z86.79 HX OF CARDIOMYOPATHY: Primary | ICD-10-CM

## 2017-11-08 DIAGNOSIS — E78.2 ELEVATED TRIGLYCERIDES WITH HIGH CHOLESTEROL: ICD-10-CM

## 2017-11-08 DIAGNOSIS — I49.3 PVC (PREMATURE VENTRICULAR CONTRACTION): ICD-10-CM

## 2017-11-08 DIAGNOSIS — Z86.79 HX OF CARDIOMYOPATHY: ICD-10-CM

## 2017-11-08 NOTE — PROGRESS NOTES
Explained procedure to patient, monitoring EKG/HR/BP,walking on treadmill,  and risks/discomforts. All concerns and questions addressed. Consent obtained. See scanned report. Dr. Colletta Dull ordered and Dr. Colletta Dull read study. ID verified per protocol. Pt  reported no symptoms at completion of protocol.

## 2017-11-13 ENCOUNTER — TELEPHONE (OUTPATIENT)
Dept: CARDIOLOGY CLINIC | Age: 67
End: 2017-11-13

## 2017-11-13 DIAGNOSIS — I42.9 CARDIOMYOPATHY (HCC): ICD-10-CM

## 2017-11-13 DIAGNOSIS — I49.3 PVC (PREMATURE VENTRICULAR CONTRACTION): ICD-10-CM

## 2017-11-13 RX ORDER — METOPROLOL SUCCINATE 25 MG/1
TABLET, EXTENDED RELEASE ORAL
Qty: 90 TAB | Refills: 1 | Status: SHIPPED | OUTPATIENT
Start: 2017-11-13 | End: 2017-11-27 | Stop reason: SDUPTHER

## 2017-11-13 NOTE — TELEPHONE ENCOUNTER
Pt would like the results of her recent testing. Pt can be reached at [phone].     Thank you,  Patra Siemens

## 2017-11-15 ENCOUNTER — DOCUMENTATION ONLY (OUTPATIENT)
Dept: CARDIOLOGY CLINIC | Age: 67
End: 2017-11-15

## 2017-11-15 NOTE — TELEPHONE ENCOUNTER
Pt is calling again in regards to her recent testing results. Pt can be reached at [phone].     Thank you,  Spenser Quick

## 2017-11-20 DIAGNOSIS — E78.5 HYPERLIPIDEMIA, UNSPECIFIED HYPERLIPIDEMIA TYPE: ICD-10-CM

## 2017-11-21 RX ORDER — LOSARTAN POTASSIUM 50 MG/1
TABLET ORAL
Qty: 90 TAB | Refills: 0 | Status: SHIPPED | OUTPATIENT
Start: 2017-11-21 | End: 2017-11-27 | Stop reason: SDUPTHER

## 2017-11-21 RX ORDER — ATORVASTATIN CALCIUM 10 MG/1
TABLET, FILM COATED ORAL
Qty: 90 TAB | Refills: 0 | Status: SHIPPED | OUTPATIENT
Start: 2017-11-21 | End: 2018-02-01 | Stop reason: SDUPTHER

## 2017-11-27 ENCOUNTER — OFFICE VISIT (OUTPATIENT)
Dept: CARDIOLOGY CLINIC | Age: 67
End: 2017-11-27

## 2017-11-27 VITALS
SYSTOLIC BLOOD PRESSURE: 130 MMHG | OXYGEN SATURATION: 98 % | DIASTOLIC BLOOD PRESSURE: 80 MMHG | HEIGHT: 64 IN | BODY MASS INDEX: 29.71 KG/M2 | HEART RATE: 69 BPM | WEIGHT: 174 LBS

## 2017-11-27 DIAGNOSIS — E78.5 DYSLIPIDEMIA: ICD-10-CM

## 2017-11-27 DIAGNOSIS — I49.3 PVC (PREMATURE VENTRICULAR CONTRACTION): ICD-10-CM

## 2017-11-27 DIAGNOSIS — I10 ESSENTIAL HYPERTENSION: Primary | ICD-10-CM

## 2017-11-27 NOTE — PROGRESS NOTES
Visit Vitals    /80 (BP 1 Location: Right arm, BP Patient Position: Sitting)    Pulse 69    Ht 5' 4\" (1.626 m)    Wt 174 lb (78.9 kg)    SpO2 98%    BMI 29.87 kg/m2

## 2017-11-27 NOTE — PROGRESS NOTES
Chao Bautista MD    [id number], [address] [facility] ([place])    Office [phone],[facility] [phone]  Pager [phone]    Vivien Agustin is a 79 y.o. female is here for f/u visit . 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 Assessment:    Hx of PVC/nonsustained VT - on Mexiletine  Hx of non ischemic cardiomyopathy - normal EF on ECHO 11/2014  Hx of orthostasis/dizziness - currently aysmpotmatic  HTN  HLD          Plan:     Continue antihypertensives ( on B Blocker). Patient had a Holter test 8/16. Other than 1 blocked P-wave there was no other significant arrhythmia. Normal stress study. EF normal in echo. She will stop her mexiletine at the beginning of the year (2018). After stopping the medication, she will call back in a couple of weeks and we will arrange to put a E cardio event monitor. She will follow-up after wearing the E cardio event monitor towards the end of February. .      Patient understands the plan. All questions were answered to the patient's satisfaction.     Medication Side Effects and Warnings were discussed with patient: yes  Patient Labs were reviewed and or requested:  yes  Patient Past Records were reviewed and or requested: yes    I appreciate the opportunity to be involved in Ms. Akash Hayward. See note below for details. Please do not hesitate to contact us with questions or concerns. Coty Tarango MD    Cardiac Testing/ Procedures: A. Cardiac Cath/PCI:    B.ECHO/ISAAC:   10/5/16-EF 00-25%, grade 1 diastolic dysfunction, mild TR  11/21/14Left ventricle: Systolic function was normal. Ejection fraction was  estimated to be 55 %. There were no regional wall motion abnormalities. Doppler parameters were consistent with abnormal left ventricular  relaxation (grade 1 diastolic dysfunction). Left atrium: The atrium was mildly dilated. Mitral valve: There was mild regurgitation. Tricuspid valve: There was mild regurgitation. 10/2013 - EF 55%    7/2007 - EF 45%    C. StressNuclear/Stress ECHO/Stress test: 11/8/17 - Treadmill test - 4.39 min/nml  04/99 - Persantine cardiolite - EF 48%/No inducible ischemia  D. Vascular:    E. EP: Hx on nonsustained VT/PVC- on mexilitine  12/4/98 - tilt test with marked heart rate variablility and drop in BP without hemodynamic collapse    8/9/16 - Holter - One blocked P wave ( 4.50 AM) ; no PV; SR    F. Miscellaneous:    Subjective:  Jaycee Stroud is a 79 y.o. female who returns for follow up  Visit. Doing well  No papitations/dizziness  No CP  Has a treadmill. Occasional mild lower extremity swelling present. Resolves with elevation. ROS:  (bold if positive, if negative)             Medications before admission:    Current Outpatient Prescriptions   Medication Sig Dispense    atorvastatin (LIPITOR) 10 mg tablet TAKE 1 TABLET NIGHTLY 90 Tab    metFORMIN (GLUCOPHAGE) 1,000 mg tablet Take 1 Tab by mouth two (2) times daily (with meals). 180 Tab    fenofibrate nanocrystallized (TRICOR) 145 mg tablet Take 1 Tab by mouth daily.  90 Tab    losartan (COZAAR) 50 mg tablet Take 1 Tab by mouth daily. 90 Tab    metoprolol succinate (TOPROL-XL) 25 mg XL tablet Take 1 Tab by mouth daily. 90 Tab    mexiletine (MEXITIL) 250 mg capsule Take 1 Cap by mouth two (2) times a day. 180 Cap    omega-3 acid ethyl esters (LOVAZA) 1 gram capsule Three capsules by mouth daily. 90 Cap    aspirin delayed-release 81 mg tablet Take 162 mg by mouth nightly.  levothyroxine (SYNTHROID) 137 mcg tablet Take 137 mcg by mouth Daily (before breakfast).  cholecalciferol, VITAMIN D3, (VITAMIN D3) 5,000 unit tab tablet Take 5,000 Units by mouth daily.  sertraline (ZOLOFT) 100 mg tablet Take 100 mg by mouth daily.  raloxifene (EVISTA) 60 mg tablet Take 60 mg by mouth daily.  CYANOCOBALAMIN (VITAMIN B-12 IJ) by Injection route every fourteen (14) days. No current facility-administered medications for this visit. Physical Exam:  Visit Vitals    /80 (BP 1 Location: Right arm, BP Patient Position: Sitting)    Pulse 69    Ht 5' 4\" (1.626 m)    Wt 174 lb (78.9 kg)    SpO2 98%    BMI 29.87 kg/m2          Gen: Well-developed, well-nourished, in no acute distress  Neck: Supple,No JVD, No Carotid Bruit,   Resp: No accessory muscle use, Clear breath sounds, No rales or rhonchi  Card: Regular Rate,Rythm,Normal S1, S2, No murmurs, rubs or gallop. No thrills.    Abd:  Soft, non-tender, non-distended,BS+,   MSK: No cyanosis  Skin: No rashes    Neuro: moving all four extremities , follows commands appropriately  Psych:  Good insight, oriented to person, place , alert, Nml Affect  LE: No edema    EKG:       LABS:        Lab Results   Component Value Date/Time    WBC 7.3 04/08/2016 11:53 AM    HGB 12.1 04/08/2016 11:53 AM    HCT 38.4 04/08/2016 11:53 AM    PLATELET 120 01/54/6571 11:53 AM    MCV 88.5 04/08/2016 11:53 AM     Lab Results   Component Value Date/Time    Sodium 139 02/01/2017 09:37 AM    Potassium 4.0 02/01/2017 09:37 AM    Chloride 102 02/01/2017 09:37 AM CO2 21 02/01/2017 09:37 AM    Anion gap 12 09/09/2016 11:23 AM    Glucose 87 02/01/2017 09:37 AM    BUN 15 02/01/2017 09:37 AM    Creatinine 0.55 02/01/2017 09:37 AM    BUN/Creatinine ratio 27 02/01/2017 09:37 AM    GFR est  02/01/2017 09:37 AM    GFR est non-AA 98 02/01/2017 09:37 AM    Calcium 9.3 02/01/2017 09:37 AM       Lab Results   Component Value Date/Time    aPTT 25.2 09/14/2015 12:06 PM     Lab Results   Component Value Date/Time    INR 1.0 09/14/2015 12:06 PM    Prothrombin time 10.4 09/14/2015 12:06 PM     No components found for: Vandana Knight MD

## 2017-11-27 NOTE — MR AVS SNAPSHOT
Visit Information Date & Time Provider Department Dept. Phone Encounter #  
 11/27/2017  8:40 AM Adriana Nichols MD [facility] [phone] [id number] Upcoming Health Maintenance Date Due Hepatitis C Screening 1950 DTaP/Tdap/Td series (1 - Tdap) 3/9/1971 BREAST CANCER SCRN MAMMOGRAM 3/9/2000 FOBT Q 1 YEAR AGE 50-75 3/9/2000 ZOSTER VACCINE AGE 60> 1/9/2010 GLAUCOMA SCREENING Q2Y 3/9/2015 Pneumococcal 65+ Low/Medium Risk (1 of 2 - PCV13) 3/9/2015 MEDICARE YEARLY EXAM 3/9/2015 Influenza Age 5 to Adult 8/1/2017 Allergies as of 11/27/2017  Review Complete On: 11/27/2017 By: Trenton Watts  
  
 Severity Noted Reaction Type Reactions Sulfa (Sulfonamide Antibiotics)  07/16/2010    Hives Current Immunizations  Never Reviewed No immunizations on file. Not reviewed this visit You Were Diagnosed With   
  
 Codes Comments Essential hypertension    -  Primary ICD-10-CM: I10 
ICD-9-CM: 401.9 PVC (premature ventricular contraction)     ICD-10-CM: I49.3 ICD-9-CM: 427.69 Dyslipidemia     ICD-10-CM: E78.5 ICD-9-CM: 272.4 Vitals BP Pulse Height(growth percentile) Weight(growth percentile) SpO2 BMI  
 130/80 (BP 1 Location: Right arm, BP Patient Position: Sitting) 69 5' 4\" (1.626 m) 174 lb (78.9 kg) 98% 29.87 kg/m2 OB Status Smoking Status Hysterectomy Never Smoker Vitals History BMI and BSA Data Body Mass Index Body Surface Area  
 29.87 kg/m 2 1.89 m 2 Preferred Pharmacy Pharmacy Name Phone  [address] [phone] Your Updated Medication List  
  
   
This list is accurate as of: 11/27/17  9:44 AM.  Always use your most recent med list.  
  
  
  
  
 aspirin delayed-release 81 mg tablet Take 162 mg by mouth nightly. atorvastatin 10 mg tablet Commonly known as:  LIPITOR  
TAKE 1 TABLET NIGHTLY cholecalciferol (VITAMIN D3) 5,000 unit Tab tablet Commonly known as:  VITAMIN D3 Take 5,000 Units by mouth daily. EVISTA 60 mg tablet Generic drug:  raloxifene Take 60 mg by mouth daily. fenofibrate nanocrystallized 145 mg tablet Commonly known as:  [employer] Take 1 Tab by mouth daily. levothyroxine 137 mcg tablet Commonly known as:  SYNTHROID Take 137 mcg by mouth Daily (before breakfast). losartan 50 mg tablet Commonly known as:  COZAAR Take 1 Tab by mouth daily. metFORMIN 1,000 mg tablet Commonly known as:  GLUCOPHAGE Take 1 Tab by mouth two (2) times daily (with meals). metoprolol succinate 25 mg XL tablet Commonly known as:  TOPROL-XL Take 1 Tab by mouth daily. mexiletine 250 mg capsule Commonly known as:  MEXITIL Take 1 Cap by mouth two (2) times a day. omega-3 acid ethyl esters 1 gram capsule Commonly known as:  Peggi Fishman Three capsules by mouth daily. VITAMIN B-12 INJECTION  
by Injection route every fourteen (14) days. ZOLOFT 100 mg tablet Generic drug:  sertraline Take 100 mg by mouth daily. Introducing [facility]! Dear Sotero Carpenter: Thank you for requesting a Estimote account. Our records indicate that you already have an active Estimote account. You can access your account anytime at [email or web address] Did you know that you can access your hospital and ER discharge instructions at any time in Estimote? You can also review all of your test results from your hospital stay or ER visit. Additional Information If you have questions, please visit the Frequently Asked Questions section of the Estimote website at [email or web address] Remember, Estimote is NOT to be used for urgent needs. For medical emergencies, dial 911. Now available from your iPhone and Android! Please provide this summary of care documentation to your next provider. Your primary care clinician is listed as [facility]. If you have any questions after today's visit, please call [phone].

## 2018-02-01 DIAGNOSIS — E78.5 HYPERLIPIDEMIA, UNSPECIFIED HYPERLIPIDEMIA TYPE: ICD-10-CM

## 2018-02-01 DIAGNOSIS — E88.81 INSULIN RESISTANCE: ICD-10-CM

## 2018-02-01 DIAGNOSIS — R42 DIZZINESS: ICD-10-CM

## 2018-02-01 DIAGNOSIS — E78.2 MIXED HYPERLIPIDEMIA WITH APOLIPOPROTEIN E2 VARIANT: ICD-10-CM

## 2018-02-01 DIAGNOSIS — I42.8 OTHER CARDIOMYOPATHY (HCC): ICD-10-CM

## 2018-02-01 DIAGNOSIS — I49.3 PVC (PREMATURE VENTRICULAR CONTRACTION): ICD-10-CM

## 2018-02-01 DIAGNOSIS — I25.5 ISCHEMIC CARDIOMYOPATHY: ICD-10-CM

## 2018-02-01 DIAGNOSIS — E55.9 VITAMIN D DEFICIENCY: ICD-10-CM

## 2018-02-01 DIAGNOSIS — I10 ESSENTIAL HYPERTENSION: ICD-10-CM

## 2018-02-01 DIAGNOSIS — E78.2 ELEVATED TRIGLYCERIDES WITH HIGH CHOLESTEROL: ICD-10-CM

## 2018-02-01 RX ORDER — METFORMIN HYDROCHLORIDE 1000 MG/1
1000 TABLET ORAL 2 TIMES DAILY WITH MEALS
Qty: 180 TAB | Refills: 3 | Status: SHIPPED | OUTPATIENT
Start: 2018-02-01 | End: 2018-03-29 | Stop reason: SDUPTHER

## 2018-02-01 RX ORDER — METOPROLOL SUCCINATE 25 MG/1
25 TABLET, EXTENDED RELEASE ORAL DAILY
Qty: 90 TAB | Refills: 3 | Status: SHIPPED | OUTPATIENT
Start: 2018-02-01 | End: 2018-03-29 | Stop reason: SDUPTHER

## 2018-02-01 RX ORDER — LOSARTAN POTASSIUM 50 MG/1
50 TABLET ORAL DAILY
Qty: 90 TAB | Refills: 3 | Status: SHIPPED | OUTPATIENT
Start: 2018-02-01 | End: 2018-10-26 | Stop reason: SDUPTHER

## 2018-02-01 RX ORDER — ATORVASTATIN CALCIUM 10 MG/1
TABLET, FILM COATED ORAL
Qty: 90 TAB | Refills: 3 | Status: SHIPPED | OUTPATIENT
Start: 2018-02-01 | End: 2019-03-28 | Stop reason: SDUPTHER

## 2018-02-01 RX ORDER — FENOFIBRATE 145 MG/1
145 TABLET, COATED ORAL DAILY
Qty: 90 TAB | Refills: 3 | Status: SHIPPED | OUTPATIENT
Start: 2018-02-01 | End: 2019-03-28 | Stop reason: SDUPTHER

## 2018-02-06 ENCOUNTER — OFFICE VISIT (OUTPATIENT)
Dept: NEUROLOGY | Age: 68
End: 2018-02-06

## 2018-02-06 VITALS
HEIGHT: 64 IN | OXYGEN SATURATION: 98 % | DIASTOLIC BLOOD PRESSURE: 80 MMHG | WEIGHT: 174 LBS | HEART RATE: 89 BPM | SYSTOLIC BLOOD PRESSURE: 136 MMHG | BODY MASS INDEX: 29.71 KG/M2

## 2018-02-06 DIAGNOSIS — R20.2 NUMBNESS AND TINGLING OF BOTH LEGS BELOW KNEES: ICD-10-CM

## 2018-02-06 DIAGNOSIS — M54.10 RADICULAR SYNDROME OF RIGHT LEG: ICD-10-CM

## 2018-02-06 DIAGNOSIS — R20.0 NUMBNESS AND TINGLING OF BOTH LEGS BELOW KNEES: ICD-10-CM

## 2018-02-06 DIAGNOSIS — G25.81 RESTLESS LEGS: ICD-10-CM

## 2018-02-06 DIAGNOSIS — M79.604 RIGHT LEG PAIN: Primary | ICD-10-CM

## 2018-02-06 RX ORDER — METHYLPREDNISOLONE 4 MG/1
TABLET ORAL
Qty: 1 DOSE PACK | Refills: 0 | Status: SHIPPED | OUTPATIENT
Start: 2018-02-06 | End: 2018-02-06

## 2018-02-06 RX ORDER — METHYLPREDNISOLONE 4 MG/1
TABLET ORAL
Qty: 1 DOSE PACK | Refills: 0 | Status: SHIPPED | OUTPATIENT
Start: 2018-02-06 | End: 2018-03-01

## 2018-02-06 RX ORDER — GABAPENTIN 300 MG/1
CAPSULE ORAL
Qty: 60 CAP | Refills: 1 | Status: SHIPPED | OUTPATIENT
Start: 2018-02-06 | End: 2018-03-01

## 2018-02-06 NOTE — PROGRESS NOTES
Name:  Rebekah Shoemaker      :  1950    PCP:   Madiha Abreu MD      Referring:  Self   MRN:   [id number]    Chief Complaint:   Chief Complaint   Patient presents with    Back Pain     right side radiating down right leg x 1 month    Numbness     both legs from the knee down x 1 month       HISTORY OF PRESENT ILLNESS:     This is a 79 y.o. female with hx of autonomic dysfunction (dx ), left breast lumpectomy and RT (no chemo, ), pre-DM, vit D deficiency, hypercholesterolemia, hypothyroid, anxiety who presents to discuss numbness of both legs from knee down and right low back pain radiating down right leg. Describes that over the past 6 months she's had intermittent numbness sensation in both lower legs (knees down) including the feet. Doesn't notice it when standing, walking around, moving during the daytime. Mostly notices it when relaxing in the evening or when trying to go to sleep. Sometimes notices it during daytime if sitting. Not painful. Legs feel normal to touch. Symptoms are less when moving legs. No hx of anemia. Is Pre-DMHad some blood work few months back with endocrinologist, not sure what was checked. Reports that starting around 1 month ago began to have pain in right lower back, radiating to right buttock, posterior-lateral thigh, calf, not into foot. Pain worse with standing or having leg in dependent position. Propping the leg up helps. Tried tylenol didn't help. She says she can't take NSAIDs due to potential interactions with cardiac meds. Hasn't tried medrol dose alex or been to PT yet for this.       Complete Review of Systems: + anxiety, depression, joint pain; otherwise as noted in HPI     Allergies   Allergen Reactions    Sulfa (Sulfonamide Antibiotics) Hives     Past Medical History:   Diagnosis Date    Autonomic dysfunction 98    tilt test with marked heart rate variablility and drop in BP without hemodynamic collapse    Cancer ([facility] [address]     left breast lumpectomy , Rx RT    Diabetes ([facility] [address] [address]     pre-diabetic, on metformin    Dilated cardiomyopathy ([facility] [address] 2/8/06    nonischemic    Elevated triglycerides with high cholesterol 11/21/2014    Essential hypertension 2/9/2017    Hypercholesterolemia     Hypothyroidism 1994    Dr. Antolin Roy.    Ill-defined condition     anxiety    Ill-defined condition     high cholesterol    Insulin resistance 11/21/2014    Mixed hyperlipidemia with apolipoprotein E2 variant 11/21/2014    Nausea & vomiting     Osteoarthritis     diffuse. Dr. Kyle Mart Other and unspecified hyperlipidemia 12/21/2010    Psychiatric disorder     anxiety    PVC's 1994    Sleep apnea     Not wearing CPAP    Vitamin D deficiency 11/21/2014     Current Outpatient Prescriptions   Medication Sig Dispense Refill    gabapentin (NEURONTIN) 300 mg capsule Take 1 cap 30 minutes before bedtime, and 1 cap during daytime if needed for restless leg symptoms and right leg pain. 60 Cap 1    methylPREDNISolone (MEDROL DOSEPACK) 4 mg tablet Take as directed on package. Take in AM with food 1 Dose Pack 0    metoprolol succinate (TOPROL-XL) 25 mg XL tablet Take 1 Tab by mouth daily. 90 Tab 3    losartan (COZAAR) 50 mg tablet Take 1 Tab by mouth daily. 90 Tab 3    fenofibrate nanocrystallized (TRICOR) 145 mg tablet Take 1 Tab by mouth daily. 90 Tab 3    metFORMIN (GLUCOPHAGE) 1,000 mg tablet Take 1 Tab by mouth two (2) times daily (with meals). 180 Tab 3    atorvastatin (LIPITOR) 10 mg tablet TAKE 1 TABLET NIGHTLY 90 Tab 3    mexiletine (MEXITIL) 250 mg capsule Take 1 Cap by mouth two (2) times a day. 180 Cap 3    omega-3 acid ethyl esters (LOVAZA) 1 gram capsule Three capsules by mouth daily. 90 Cap 1    aspirin delayed-release 81 mg tablet Take 162 mg by mouth nightly.  levothyroxine (SYNTHROID) 137 mcg tablet Take 137 mcg by mouth Daily (before breakfast).       cholecalciferol, VITAMIN D3, (VITAMIN D3) 5,000 unit tab tablet Take 5,000 Units by mouth daily.  sertraline (ZOLOFT) 100 mg tablet Take 100 mg by mouth daily.  raloxifene (EVISTA) 60 mg tablet Take 60 mg by mouth daily.  CYANOCOBALAMIN (VITAMIN B-12 IJ) by Injection route every fourteen (14) days. Past Surgical History:   Procedure Laterality Date    HX APPENDECTOMY  1969    HX BREAST AUGMENTATION Bilateral 12/18/2015    REVISION BILATERAL RECONSTRUCTED BREAST, FAT GRAFTING TO CHEST WALL, NIPPLE RECONSTRUCTION performed by Ami Madrigal MD at [address] HX BREAST AUGMENTATION Bilateral 10/14/2016    REVISION BILATERAL BREAST RECONSTRUCTION / FAT GRAFTING TO CHEST WALL; REVISION OF ABDOMINAL SCAR performed by Ami Madrigal MD at [address] HX BREAST LUMPECTOMY Left 1997    lumpectomy    HX BREAST RECONSTRUCTION Bilateral 9/28/2015    REVISION OF BILATERAL RECONSTRUCTED BREASTS, FAT GRAFTING TO CHEST WALL,  REVISION OF ABDOMINAL SCAR performed by Ami Madrigal MD at [address] HX CATARACT REMOVAL Bilateral 2008    with lens implants    HX CHOLECYSTECTOMY  2015    HX COLONOSCOPY  2014    HX ENDOSCOPY  2014    [address]    LVEF 60%. Normal. Dr. Gareth Flynn.  HX HERNIA REPAIR  4/22 16    HX HYSTERECTOMY  1986    left 1 ovary    HX MASTECTOMY  5/2015    BL mastectomy    HX ORTHOPAEDIC Left 1988    foot surgery    HX SALPINGO-OOPHORECTOMY  1988    remaining ovary removed    HX TONSILLECTOMY      HX WISDOM TEETH EXTRACTION       Family History   Problem Relation Age of Onset    Kidney Disease Mother     Cancer Mother      ovarian, esophageal cancer    Heart Disease Father     Coronary Artery Disease Brother      Social History     Social History    Marital status:      Spouse name: N/A    Number of children: N/A    Years of education: N/A     Occupational History    Not on file.      Social History Main Topics    Smoking status: Never Smoker    Smokeless tobacco: Never Used    Alcohol use No    Drug use: No    Sexual activity: Not on file     Other Topics Concern    Not on file     Social History Narrative       PHYSICAL EXAM  Vitals:    02/06/18 0904   BP: 136/80   BP 1 Location: Right arm   BP Patient Position: Sitting   Pulse: 89   SpO2: 98%   Weight: 78.9 kg (174 lb)   Height: 5' 4\" (1.626 m)       General:  Alert, cooperative, NAD   Head:  Normocephalic, atraumatic. Eyes:  Conjunctivae/corneas clear   Lungs:  Heart:  Non labored breathing  Regular rate, rhythm   Extremities: No edema. Skin: No rashes    Neurologic Exam       Language: normal  Memory:  Alert, oriented to person, place, situation    Cranial Nerves:  I: smell Not tested   II: visual fields Full to confrontation   II: pupils Equal, round, reactive to light   II: optic disc No papilledema   III,VII: ptosis none   III,IV,VI: extraocular muscles  normal   V: facial light touch sensation  normal   VII: facial muscle function  symmetric   VIII: hearing symmetric   IX: soft palate elevation  normal   XI: sternocleidomastoid strength 5/5   XII: tongue  midline      Motor: normal bulk, tone, strength in all exts    Sensory:   Patchy reduced pinprick from mid-calves down, reduced vibration below knees, intact LT and proprioception in both legs and feet. Intact LT, prick, temp, vibration in both hands and arms. Cerebellar: no rest, postural, or intention tremor  Normal FNF and H-Shin bilaterally  Reflexes: 2+ biceps, 2-3+ patellars, trace to absent achilles bilaterally  Plantar response: neutral bilaterally    Gait: mildly antalgic gait (favoring right knee, chronic knee problem). Performs tandem gait  Romberg negative       Reviewed prior MRI L-spine images (2014)  No spinal stenosis, mild disc degeneration at L3-4 and L4-5  Mild facet arthropathy at L4-5 bilaterally      ASSESSMENT AND PLAN    ICD-10-CM ICD-9-CM    1.  Right leg pain M79.604 729.5 REFERRAL TO PHYSICAL THERAPY      EMG LIMITED      methylPREDNISolone (MEDROL DOSEPACK) 4 mg tablet      DISCONTINUED: methylPREDNISolone (MEDROL DOSEPACK) 4 mg tablet   2. Radicular syndrome of right leg M54.10 724.4 REFERRAL TO PHYSICAL THERAPY      EMG LIMITED      gabapentin (NEURONTIN) 300 mg capsule      methylPREDNISolone (MEDROL DOSEPACK) 4 mg tablet      DISCONTINUED: methylPREDNISolone (MEDROL DOSEPACK) 4 mg tablet   3. Numbness and tingling of both legs below knees Z12.7 260.4 METABOLIC PANEL, BASIC    W98.7  PROTEIN ELECTROPHORESIS      TSH 3RD GENERATION      VITAMIN B12      EMG LIMITED      gabapentin (NEURONTIN) 300 mg capsule      methylPREDNISolone (MEDROL DOSEPACK) 4 mg tablet      DISCONTINUED: methylPREDNISolone (MEDROL DOSEPACK) 4 mg tablet   4. Restless legs G25.81 333.94 gabapentin (NEURONTIN) 300 mg capsule         1) Numbness from knees down  Mild sensory loss in both lower legs (mid-calves down). DDx: mild diabetic peripheral neuropathy vs autonomic neuropathy with hx of autonomic dysfunction. Check neuropathy labs, check EMG/ NCS both legs. 2) Restless legs  Check CBC (? Anemia) and remainder of neuropathy labs. Trial of Gabapentin 300 mg QHS. Common SEFx discussed    3) Right lumbar radicular pain  Check EMG right leg, medrol dosepak, Gabapentin 300 mg one tab during daytime if needed. Discussed referral to PT but pt says she wants to see if medrol dosepak and time are enough for symptoms to subside.   We agreed that I would give her the referral and she would pursue if symptoms aren't getting better    4) F/u in 6 weeks    Signed By: Denis Gifford MD     February 6, 2018

## 2018-02-06 NOTE — MR AVS SNAPSHOT
[address] [address] [address] 
[phone] Patient: Jevon Ocampo MRN: [id number] ZCV:0/6/2752 Visit Information Date & Time Provider Department Dept. Phone Encounter #  
 2/6/2018  9:00 AM Amber Leonard MD [facility] [phone] [id number] Follow-up Instructions Return in about 6 weeks (around 3/20/2018). Your Appointments 2/26/2018  9:40 AM  
ESTABLISHED PATIENT with Carolina Bronw MD  
[facility] ([facility]) Appt Note: 3mo f/u per Dr. Campbell [facility] 600 1007 [facility]  
54 [facility] [address] [address] Upcoming Health Maintenance Date Due Hepatitis C Screening 1950 DTaP/Tdap/Td series (1 - Tdap) 3/9/1971 BREAST CANCER SCRN MAMMOGRAM 3/9/2000 FOBT Q 1 YEAR AGE 50-75 3/9/2000 ZOSTER VACCINE AGE 60> 1/9/2010 GLAUCOMA SCREENING Q2Y 3/9/2015 Pneumococcal 65+ Low/Medium Risk (1 of 2 - PCV13) 3/9/2015 MEDICARE YEARLY EXAM 3/9/2015 Influenza Age 5 to Adult 8/1/2017 Allergies as of 2/6/2018  Review Complete On: 2/6/2018 By: Hamzah Sue LPN Severity Noted Reaction Type Reactions Sulfa (Sulfonamide Antibiotics)  07/16/2010    Hives Current Immunizations  Never Reviewed No immunizations on file. Not reviewed this visit You Were Diagnosed With   
  
 Codes Comments Right leg pain    -  Primary ICD-10-CM: M79.604 ICD-9-CM: 729.5 Radicular syndrome of right leg     ICD-10-CM: M54.10 ICD-9-CM: 724.4 Numbness and tingling of both legs below knees     ICD-10-CM: R20.0, R20.2 ICD-9-CM: 782.0 Restless legs     ICD-10-CM: G25.81 ICD-9-CM: 333.94 Vitals BP Pulse Height(growth percentile) Weight(growth percentile) SpO2 BMI 136/80 (BP 1 Location: Right arm, BP Patient Position: Sitting) 89 5' 4\" (1.626 m) 174 lb (78.9 kg) 98% 29.87 kg/m2 OB Status Smoking Status Hysterectomy Never Smoker BMI and BSA Data Body Mass Index Body Surface Area  
 29.87 kg/m 2 1.89 m 2 Preferred Pharmacy Pharmacy Name Phone [facility] [address] [place] [address] [phone] Your Updated Medication List  
  
   
This list is accurate as of: 2/6/18  9:48 AM.  Always use your most recent med list.  
  
  
  
  
 aspirin delayed-release 81 mg tablet Take 162 mg by mouth nightly. atorvastatin 10 mg tablet Commonly known as:  LIPITOR  
TAKE 1 TABLET NIGHTLY  
  
 cholecalciferol (VITAMIN D3) 5,000 unit Tab tablet Commonly known as:  VITAMIN D3 Take 5,000 Units by mouth daily. EVISTA 60 mg tablet Generic drug:  raloxifene Take 60 mg by mouth daily. fenofibrate nanocrystallized 145 mg tablet Commonly known as:  [employer] Take 1 Tab by mouth daily. gabapentin 300 mg capsule Commonly known as:  NEURONTIN Take 1 cap 30 minutes before bedtime, and 1 cap during daytime if needed for restless leg symptoms and right leg pain. levothyroxine 137 mcg tablet Commonly known as:  SYNTHROID Take 137 mcg by mouth Daily (before breakfast). losartan 50 mg tablet Commonly known as:  COZAAR Take 1 Tab by mouth daily. metFORMIN 1,000 mg tablet Commonly known as:  GLUCOPHAGE Take 1 Tab by mouth two (2) times daily (with meals). methylPREDNISolone 4 mg tablet Commonly known as:  Erin Schwalbe Take as directed on package. Take in Am with food  
  
 metoprolol succinate 25 mg XL tablet Commonly known as:  TOPROL-XL Take 1 Tab by mouth daily. mexiletine 250 mg capsule Commonly known as:  MEXITIL Take 1 Cap by mouth two (2) times a day. omega-3 acid ethyl esters 1 gram capsule Commonly known as:  Deborha Williamly Three capsules by mouth daily. VITAMIN B-12 INJECTION  
by Injection route every fourteen (14) days. ZOLOFT 100 mg tablet Generic drug:  sertraline Take 100 mg by mouth daily. Prescriptions Sent to Pharmacy Refills  
 methylPREDNISolone (MEDROL DOSEPACK) 4 mg tablet 0 Sig: Take as directed on package. Take in Am with food Class: Normal  
 Pharmacy: [facility] [address] Ph #: [phone]  
 gabapentin (NEURONTIN) 300 mg capsule 1 Sig: Take 1 cap 30 minutes before bedtime, and 1 cap during daytime if needed for restless leg symptoms and right leg pain. Class: Normal  
 Pharmacy: [facility] [address] [place] [address] [address] Ph #: [phone] We Performed the Following METABOLIC PANEL, BASIC [15162 CPT(R)] PROTEIN ELECTROPHORESIS [33547 CPT(R)] REFERRAL TO PHYSICAL THERAPY [JIM26 Custom] Comments:  
 Right leg radicular pain, suggestive of right S1 or L5 radiculopathy. Please eval and treat with 6 week course of PT.  
 TSH 3RD GENERATION [89517 CPT(R)] VITAMIN B12 [id number] CPT(R)] Follow-up Instructions Return in about 6 weeks (around 3/20/2018). To-Do List   
 02/06/2018 Neurology:  EMG LIMITED Referral Information Referral ID Referred By Referred To  
  
 [id number] Yoly Peg Not Available Visits Status Start Date End Date 1 New Request 2/6/18 2/6/19 If your referral has a status of pending review or denied, additional information will be sent to support the outcome of this decision. Introducing [facility]! Dear Jacqueline Anthony: Thank you for requesting a GetMyRx account. Our records indicate that you already have an active GetMyRx account. You can access your account anytime at [email or web address] Did you know that you can access your hospital and ER discharge instructions at any time in yoone? You can also review all of your test results from your hospital stay or ER visit. Additional Information If you have questions, please visit the Frequently Asked Questions section of the yoone website at [email or web address] Remember, yoone is NOT to be used for urgent needs. For medical emergencies, dial 911. Now available from your iPhone and Android! Please provide this summary of care documentation to your next provider. Your primary care clinician is listed as [address]. If you have any questions after today's visit, please call [phone].

## 2018-02-09 ENCOUNTER — OFFICE VISIT (OUTPATIENT)
Dept: NEUROLOGY | Age: 68
End: 2018-02-09

## 2018-02-09 DIAGNOSIS — M54.16 LUMBAR RADICULOPATHY, RIGHT: Primary | ICD-10-CM

## 2018-02-09 DIAGNOSIS — G62.9 SENSORIMOTOR NEUROPATHY: ICD-10-CM

## 2018-02-09 LAB
ALBUMIN SERPL ELPH-MCNC: 4 G/DL (ref 2.9–4.4)
ALBUMIN/GLOB SERPL: 1.1 {RATIO} (ref 0.7–1.7)
ALPHA1 GLOB SERPL ELPH-MCNC: 0.3 G/DL (ref 0–0.4)
ALPHA2 GLOB SERPL ELPH-MCNC: 0.8 G/DL (ref 0.4–1)
B-GLOBULIN SERPL ELPH-MCNC: 1.3 G/DL (ref 0.7–1.3)
BUN SERPL-MCNC: 16 MG/DL (ref 8–27)
BUN/CREAT SERPL: 28 (ref 12–28)
CALCIUM SERPL-MCNC: 9.7 MG/DL (ref 8.7–10.3)
CHLORIDE SERPL-SCNC: 102 MMOL/L (ref 96–106)
CO2 SERPL-SCNC: 22 MMOL/L (ref 18–29)
CREAT SERPL-MCNC: 0.57 MG/DL (ref 0.57–1)
GAMMA GLOB SERPL ELPH-MCNC: 1.2 G/DL (ref 0.4–1.8)
GFR SERPLBLD CREATININE-BSD FMLA CKD-EPI: 111 ML/MIN/1.73
GFR SERPLBLD CREATININE-BSD FMLA CKD-EPI: 96 ML/MIN/1.73
GLOBULIN SER CALC-MCNC: 3.6 G/DL (ref 2.2–3.9)
GLUCOSE SERPL-MCNC: 86 MG/DL (ref 65–99)
M PROTEIN SERPL ELPH-MCNC: NORMAL G/DL
PLEASE NOTE, 011150: NORMAL
POTASSIUM SERPL-SCNC: 4.4 MMOL/L (ref 3.5–5.2)
PROT SERPL-MCNC: 7.6 G/DL (ref 6–8.5)
SODIUM SERPL-SCNC: 141 MMOL/L (ref 134–144)
TSH SERPL DL<=0.005 MIU/L-ACNC: 0.86 UIU/ML (ref 0.45–4.5)
VIT B12 SERPL-MCNC: 519 PG/ML (ref 232–1245)

## 2018-02-15 ENCOUNTER — TELEPHONE (OUTPATIENT)
Dept: NEUROLOGY | Age: 68
End: 2018-02-15

## 2018-02-15 NOTE — TELEPHONE ENCOUNTER
----- Message from [facility] sent at 2/15/2018  3:55 PM EST -----  Regarding: Dr. Asia Sims refill  The pt received \"Prednisone\" in the mail today, and she wants to know is she supposed to take the medication. Best contact number is [phone].

## 2018-02-16 NOTE — TELEPHONE ENCOUNTER
Steroid pack was sent to [phone] the day of her visit, but cancelled and sent to local pharmacy instead. Contacted patient and she states she has already completed the steroid pack she got from local pharmacy. Advised her not to take the second pack received from mail order. Patient voiced understanding and will call back if any further questions or concerns.

## 2018-02-28 NOTE — PROCEDURES
EMG/ NCS Report  [facility], [address]  Ph: [phone]  FAX: [phone]/ [phone]  Test Date:  2018    Patient: Gila Burns : 1950 Physician: Jacek Bose M.D. Sex: Female Height: ' \" Ref Phys: Jacek Bose M.D.   ID#: [id number] Weight:  lbs. Technician: Netta Zayas     Patient History / Exam:  CC: NUMBNESS IN DEAN LEGS BELOW KNESS,? RT. RADIC. SYMPTOMS X 6 MOS. PT. IS PREDIABETIC. EMG & NCV Findings:  Evaluation of the left Fibular motor and the right Fibular motor nerves showed normal distal onset latency (L4.6, R4.1 ms), reduced amplitude (L0.6, R0.5 mV), normal conduction velocity (B Fib-Ankle, L39, R38 m/s), and normal conduction velocity (Poplt-B Fib, L45, R50 m/s). The left Fibular TA motor and the right Fibular TA motor nerves showed normal distal onset latency (L2.4, R2.8 ms), normal amplitude (L6.1, R7.1 mV), and normal conduction velocity (Poplit-Fib Head, L67, R83 m/s). The left tibial motor and the right tibial motor nerves showed normal distal onset latency (L3.9, R4.3 ms), normal amplitude (L6.0, R5.8 mV), and decreased conduction velocity (Knee-Ankle, L34, R34 m/s). The left Sup Fibular sensory and the right Sup Fibular sensory nerves showed no response (Lower leg) and no response (Site 2). The left sural sensory nerve showed no response (Calf), no response (Site 2), no response (Site 3), and no response (Site 4). The right sural sensory nerve showed no response (Calf) and no response (Site 2). All F Wave latencies were within normal limits. All F Wave left vs. right side latency differences were within normal limits. All H Reflex left vs. right side latency differences were within normal limits. Needle evaluation of the right posterior tibialis muscle showed increased motor unit amplitude and slightly increased polyphasic potentials.   The right medial gastrocnemius muscle showed slightly increased spontaneous activity. The right anterior tibialis muscle showed Incr Duration and increased motor unit amplitude. All remaining muscles (as indicated in the following table) showed no evidence of electrical instability. Absent bilateral lower extremity sensory responses. Significantly reduced amplitudes of bilateral peroneal motor responses recorded distally at EDB, normal onset latency and CVs.  Normal, symmetric peroneal motor responses recorded proximally at tibialis anterior. Mild slowing of bilateral tibial motor responses. Impression:  Right  S1 lumbar radiculopathy     Symmetric absent lower extremity sensory responses and reduced tibial motor responses bilaterally consistent with a mild sensorimotor polyneuropathy. No demyelinating features.         ___________________________  Altagracia Ojeda M.D.      Nerve Conduction Studies  Anti Sensory Summary Table     Stim Site NR Peak (ms) Norm Peak (ms) P-T Amp (µV) Norm P-T Amp Site1 Site2 Dist (cm)   Left Sup Fibular Anti Sensory (Lat ankle)  34.4°C   Lower leg NR  <4.6  >4 Lower leg Lat ankle 10.0   Site 2 NR          Right Sup Fibular Anti Sensory (Lat ankle)  32°C   Lower leg NR  <4.6  >4 Lower leg Lat ankle 10.0   Site 2 NR          Left Sural Anti Sensory (Lat Mall)  32.3°C   Calf NR  <4.5  >4.0 Calf Lat Mall 14.0   Site 2 NR          Site 3 NR          Site 4 NR              8.4  14.7       Right Sural Anti Sensory (Lat Mall)  34.3°C   Calf NR  <4.5  >4.0 Calf Lat Mall 14.0   Site 2 NR            Motor Summary Table     Stim Site NR Onset (ms) Norm Onset (ms) O-P Amp (mV) Norm O-P Amp Amp (Prev) (%) Site1 Site2 Dist (cm) Darnell (m/s) Norm Darnell (m/s)   Left Fibular Motor (Ext Dig Brev)  32.9°C   Ankle    4.6 <6.5 0.6 >1.1 100.0 Ankle Ext Dig Brev 8.0     B Fib    12.3  0.3  50.0 B Fib Ankle 30.0 39 >38   Poplt    14.5  0.4  133.3 Poplt B Fib 10.0 45 >42   Right Fibular Motor (Ext Dig Brev)  31.4°C   Ankle    4.1 <6.5 0.5 >1.1 100.0 Ankle Ext Dig Brev 8.0     B Fib    11.8  0.5  100.0 B Fib Ankle 29.0 38 >38   Poplt    13.8  0.4  80.0 Poplt B Fib 10.0 50 >42   Left Fibular TA Motor (Tib Ant)  30.8°C   Fib Head    2.4 <4.5 6.1 >3.0 100.0 Fib Head Tib Ant 10.0     Poplit    3.9  5.8  95.1 Poplit Fib Head 10.0 67 >40   Right Fibular TA Motor (Tib Ant)  31.1°C   Fib Head    2.8 <4.5 7.1 >3.0 100.0 Fib Head Tib Ant 10.0     Poplit    4.0  7.0  98.6 Poplit Fib Head 10.0 83 >40   Left Tibial Motor (Abd Yung Brev)  32°C   Ankle    3.9 <6.1 6.0 >1.1 100.0 Ankle Abd Yung Brev 8.0     Knee    14.3  5.0  83.3 Knee Ankle 35.0 34 >39   Right Tibial Motor (Abd Yung Brev)  31.3°C   Ankle    4.3 <6.1 5.8 >1.1 100.0 Ankle Abd Yung Brev 8.0     Knee    14.6  3.2  55.2 Knee Ankle 35.0 34 >39     F Wave Studies     NR F-Lat (ms) Lat Norm (ms) L-R F-Lat (ms) L-R Lat Norm   Left Tibial (Mrkrs) (Abd Hallucis)  30.9°C      47.07 <56 0.00 <5.7   Right Tibial (Mrkrs) (Abd Hallucis)  34.2°C      47.07 <56 0.00 <5.7     H Reflex Studies     NR H-Lat (ms) L-R H-Lat (ms) L-R Lat Norm   Left Tibial (Gastroc)  30.8°C      29.87 1.60 <2.0   Right Tibial (Gastroc)  33.1°C      31.47 1.60 <2.0     EMG     Side Muscle Nerve Root Ins Act Fibs Psw Recrt Duration Amp Poly Comment   Right PostTibialis Tibial L5, S1 Nml Nml Nml Nml Nml Incr 1+    Right MedGastroc Tibial S1-2 Nml Nml 1+ Nml Nml Nml Nml limited by cramping   Right AntTibialis Dp Br Peron L4-5 Nml Nml Nml Nml Incr Incr Nml    Right BicepsFemL Sciatic L5-S2 Nml Nml Nml Nml Nml Nml Nml    Right VastusMed Femoral L2-4 Nml Nml Nml Nml Nml Nml Nml    Right GluteusMax InfGluteal L5-S2 Nml Nml Nml Nml Nml Nml Nml    Right GluteusMed SupGluteal L4-S1 Nml Nml Nml Nml Nml Nml Nml    Right Mid Lumb Parasp Rami L4,5 Nml Nml Nml Nml Nml Nml Nml    Right Lower Lumb Parasp Rami L5,S1 Nml Nml Nml Nml Nml Nml Nml        Waveforms:

## 2018-03-01 ENCOUNTER — OFFICE VISIT (OUTPATIENT)
Dept: NEUROLOGY | Age: 68
End: 2018-03-01

## 2018-03-01 VITALS
WEIGHT: 174 LBS | OXYGEN SATURATION: 98 % | SYSTOLIC BLOOD PRESSURE: 136 MMHG | DIASTOLIC BLOOD PRESSURE: 76 MMHG | HEART RATE: 89 BPM | BODY MASS INDEX: 29.71 KG/M2 | HEIGHT: 64 IN

## 2018-03-01 DIAGNOSIS — M54.10 RADICULAR SYNDROME OF RIGHT LEG: ICD-10-CM

## 2018-03-01 DIAGNOSIS — G62.9 SENSORIMOTOR NEUROPATHY: Primary | ICD-10-CM

## 2018-03-01 DIAGNOSIS — F41.8 ANXIETY WITH DEPRESSION: ICD-10-CM

## 2018-03-01 PROBLEM — M54.16 LUMBAR RADICULOPATHY, RIGHT: Status: ACTIVE | Noted: 2018-03-01

## 2018-03-01 RX ORDER — DULOXETIN HYDROCHLORIDE 60 MG/1
60 CAPSULE, DELAYED RELEASE ORAL DAILY
Qty: 30 CAP | Refills: 1 | Status: SHIPPED | OUTPATIENT
Start: 2018-04-07 | End: 2018-03-26

## 2018-03-01 RX ORDER — DULOXETIN HYDROCHLORIDE 30 MG/1
30 CAPSULE, DELAYED RELEASE ORAL DAILY
Qty: 30 CAP | Refills: 0 | Status: SHIPPED | OUTPATIENT
Start: 2018-03-08 | End: 2018-03-21 | Stop reason: SDUPTHER

## 2018-03-01 NOTE — PROGRESS NOTES
Interval HPI:   This is a 79 y.o. female who is following up for     Chief Complaint   Patient presents with    Results       EMG: mild symmetric sensorimotor peripheral neuropathy, right lumbar radiculopathy L5 > S1  Continues to complain of tingling in both lower legs, less frequent pain down right leg  Last visit symptoms suggested this could be RLS with a concomitant radiculopathy  Tried Gabapentin but can't tolerate it during daytime  Checked labs: normal TSH, SPEP, B12, and BMP      Brief ROS: as above or otherwise negative  There have been no significant changes in PMHx, PSHx, SHx except as noted above. Allergies   Allergen Reactions    Sulfa (Sulfonamide Antibiotics) Hives     Current Outpatient Prescriptions   Medication Sig Dispense Refill    [START ON 3/8/2018] DULoxetine (CYMBALTA) 30 mg capsule Take 1 Cap by mouth daily for 30 days. No refill. Indications: Diabetic Peripheral Neuropathy 30 Cap 0    [START ON 4/7/2018] DULoxetine (CYMBALTA) 60 mg capsule Take 1 Cap by mouth daily. 30 Cap 1    metoprolol succinate (TOPROL-XL) 25 mg XL tablet Take 1 Tab by mouth daily. 90 Tab 3    losartan (COZAAR) 50 mg tablet Take 1 Tab by mouth daily. 90 Tab 3    fenofibrate nanocrystallized (TRICOR) 145 mg tablet Take 1 Tab by mouth daily. 90 Tab 3    metFORMIN (GLUCOPHAGE) 1,000 mg tablet Take 1 Tab by mouth two (2) times daily (with meals). 180 Tab 3    atorvastatin (LIPITOR) 10 mg tablet TAKE 1 TABLET NIGHTLY 90 Tab 3    omega-3 acid ethyl esters (LOVAZA) 1 gram capsule Three capsules by mouth daily. 90 Cap 1    aspirin delayed-release 81 mg tablet Take 162 mg by mouth nightly.  levothyroxine (SYNTHROID) 137 mcg tablet Take 137 mcg by mouth Daily (before breakfast).  cholecalciferol, VITAMIN D3, (VITAMIN D3) 5,000 unit tab tablet Take 5,000 Units by mouth daily.  raloxifene (EVISTA) 60 mg tablet Take 60 mg by mouth daily.       CYANOCOBALAMIN (VITAMIN B-12 IJ) by Injection route every fourteen (14) days. Physical Exam  Blood pressure 136/76, pulse 89, height 5' 4\" (1.626 m), weight 78.9 kg (174 lb), SpO2 98 %. No acute distress  Neck: no stiffness  Exts: no edema    Focused Neurological Exam     Mental status: Alert and oriented to person, place situation. Language: normal fluency and comprehension; no dysarthria. CNs:   Visual fields grossly normal  Extraocular movements intact, no nystagmus  Face appears symmetric and facial strength normal.    Hearing is intact to casual conversation. Sensory: not examined   Motor: Normal bulk and strength in all 4 extremities. Reflexes: not examined  Gait: normal    Impression      ICD-10-CM ICD-9-CM    1. Sensorimotor neuropathy G62.9 356.9 DULoxetine (CYMBALTA) 30 mg capsule      DULoxetine (CYMBALTA) 60 mg capsule   2. Radicular syndrome of right leg M54.10 724.4    3. Anxiety with depression F41.8 300.4 DULoxetine (CYMBALTA) 30 mg capsule      DULoxetine (CYMBALTA) 60 mg capsule       Will d/c Gabapentin and try Cymbalta (30 mg/ day x 1 month, then 60 mg/ day thereafter) for peripheral neuropathy pain (? from underlying autonomic dysfunction (dx in 1998), as she's not diabetic, hypothyroid, and hasn't had chemotherapy). May also help with associated anxiety-depression. For right lumbar radicular pain, advised pt to do Physical Therapy and if not improving then I can order MRI L-spine  Follow up in 2 months to reassess.

## 2018-03-01 NOTE — MR AVS SNAPSHOT
Kaylie Hagan 
 
 
 [facility] [address] [facility] [address] [phone] Patient: Rosendo Chery MRN: [id number] Visit Information Date & Time Provider Department Dept. Phone Encounter #  
 3/1/2018 10:00 AM Rogerio Roth MD [facility] Neurology [facility] [phone] [id number] Follow-up Instructions Return in about 2 months (around 2018). Your Appointments 3/23/2018  9:00 AM  
ESTABLISHED PATIENT with Kira Benedict MD  
[facility] ([facility]) Appt Note: 3mo f/u per Dr. Evy Hodge; 3mo f/u per Dr. Evy Hodge; 3mo f/u per Dr. Stephanie Padilla Dustin 600 1007 [address] [address] [address] Upcoming Health Maintenance Date Due Hepatitis C Screening 1950 DTaP/Tdap/Td series (1 - Tdap) 3/9/1971 BREAST CANCER SCRN MAMMOGRAM 3/9/2000 FOBT Q 1 YEAR AGE 50-75 3/9/2000 ZOSTER VACCINE AGE 60> 2010 GLAUCOMA SCREENING Q2Y 3/9/2015 Pneumococcal 65+ Low/Medium Risk (1 of 2 - PCV13) 3/9/2015 MEDICARE YEARLY EXAM 3/9/2015 Influenza Age 5 to Adult 2017 Allergies as of 3/1/2018  Review Complete On: 3/1/2018 By: Yohana Romero LPN Severity Noted Reaction Type Reactions Sulfa (Sulfonamide Antibiotics)  2010    Hives Current Immunizations  Never Reviewed No immunizations on file. Not reviewed this visit You Were Diagnosed With   
  
 Codes Comments Sensorimotor neuropathy    -  Primary ICD-10-CM: G62.9 ICD-9-CM: 356.9 Radicular syndrome of right leg     ICD-10-CM: M54.10 ICD-9-CM: 724.4 Anxiety with depression     ICD-10-CM: F41.8 ICD-9-CM: 300.4 Vitals BP Pulse Height(growth percentile) Weight(growth percentile) SpO2 BMI 136/76 (BP 1 Location: Left arm, BP Patient Position: Sitting) 89 5' 4\" (1.626 m) 174 lb (78.9 kg) 98% 29.87 kg/m2 OB Status Smoking Status Hysterectomy Never Smoker Vitals History BMI and BSA Data Body Mass Index Body Surface Area  
 29.87 kg/m 2 1.89 m 2 Preferred Pharmacy Pharmacy Name Phone [facility] [address] [place] [address] [phone] Your Updated Medication List  
  
   
This list is accurate as of 3/1/18 10:29 AM.  Always use your most recent med list.  
  
  
  
  
 aspirin delayed-release 81 mg tablet Take 162 mg by mouth nightly. atorvastatin 10 mg tablet Commonly known as:  LIPITOR  
TAKE 1 TABLET NIGHTLY  
  
 cholecalciferol (VITAMIN D3) 5,000 unit Tab tablet Commonly known as:  VITAMIN D3 Take 5,000 Units by mouth daily. * DULoxetine 30 mg capsule Commonly known as:  CYMBALTA Take 1 Cap by mouth daily for 30 days. No refill. Indications: Diabetic Peripheral Neuropathy Start taking on:  3/8/2018 * DULoxetine 60 mg capsule Commonly known as:  CYMBALTA Take 1 Cap by mouth daily. Start taking on:  4/7/2018 EVISTA 60 mg tablet Generic drug:  raloxifene Take 60 mg by mouth daily. fenofibrate nanocrystallized 145 mg tablet Commonly known as:  [employer] Take 1 Tab by mouth daily. levothyroxine 137 mcg tablet Commonly known as:  SYNTHROID Take 137 mcg by mouth Daily (before breakfast). losartan 50 mg tablet Commonly known as:  COZAAR Take 1 Tab by mouth daily. metFORMIN 1,000 mg tablet Commonly known as:  GLUCOPHAGE Take 1 Tab by mouth two (2) times daily (with meals). metoprolol succinate 25 mg XL tablet Commonly known as:  TOPROL-XL Take 1 Tab by mouth daily. omega-3 acid ethyl esters 1 gram capsule Commonly known as:  Allearmando Tim Three capsules by mouth daily. VITAMIN B-12 INJECTION  
by Injection route every fourteen (14) days. * Notice: This list has 2 medication(s) that are the same as other medications prescribed for you. Read the directions carefully, and ask your doctor or other care provider to review them with you. Prescriptions Printed Refills DULoxetine (CYMBALTA) 30 mg capsule 0 Starting on: 3/8/2018 Sig: Take 1 Cap by mouth daily for 30 days. No refill. Indications: Diabetic Peripheral Neuropathy Class: Print Route: Oral  
 DULoxetine (CYMBALTA) 60 mg capsule 1 Starting on: 4/7/2018 Sig: Take 1 Cap by mouth daily. Class: Print Route: Oral  
  
Follow-up Instructions Return in about 2 months (around 5/1/2018). Introducing [facility]! Dear You Kaur: Thank you for requesting a [employer] account. Our records indicate that you already have an active [employer] account. You can access your account anytime at [email or web address] Did you know that you can access your hospital and ER discharge instructions at any time in [employer]? You can also review all of your test results from your hospital stay or ER visit. Additional Information If you have questions, please visit the Frequently Asked Questions section of the [employer] website at [email or web address] Remember, [employer] is NOT to be used for urgent needs. For medical emergencies, dial 911. Now available from your iPhone and Android! Please provide this summary of care documentation to your next provider. Your primary care clinician is listed as [address] [facility]. If you have any questions after today's visit, please call [phone].

## 2018-03-17 DIAGNOSIS — E78.5 HYPERLIPIDEMIA, UNSPECIFIED HYPERLIPIDEMIA TYPE: ICD-10-CM

## 2018-03-21 ENCOUNTER — OFFICE VISIT (OUTPATIENT)
Dept: CARDIOLOGY CLINIC | Age: 68
End: 2018-03-21

## 2018-03-21 VITALS
HEART RATE: 88 BPM | OXYGEN SATURATION: 97 % | SYSTOLIC BLOOD PRESSURE: 120 MMHG | DIASTOLIC BLOOD PRESSURE: 72 MMHG | HEIGHT: 64 IN | BODY MASS INDEX: 30.11 KG/M2 | WEIGHT: 176.4 LBS | RESPIRATION RATE: 18 BRPM

## 2018-03-21 DIAGNOSIS — Z01.810 PRE-OPERATIVE CARDIOVASCULAR EXAMINATION: ICD-10-CM

## 2018-03-21 DIAGNOSIS — I49.3 PVC (PREMATURE VENTRICULAR CONTRACTION): Primary | ICD-10-CM

## 2018-03-21 DIAGNOSIS — I10 ESSENTIAL HYPERTENSION: ICD-10-CM

## 2018-03-21 DIAGNOSIS — E78.5 DYSLIPIDEMIA: ICD-10-CM

## 2018-03-21 RX ORDER — DULOXETIN HYDROCHLORIDE 30 MG/1
60 CAPSULE, DELAYED RELEASE ORAL DAILY
COMMUNITY
End: 2018-05-23

## 2018-03-21 NOTE — MR AVS SNAPSHOT
[address] [facility] 600 7520 [facility] 
[phone] Patient: Dia Baxter MRN: [id number] RRW:[birth year] Visit Information Date & Time Provider Department Dept. Phone Encounter #  
 3/21/2018  3:20 PM Corrina Mccord MD [facility] Brown [phone] [id number] Your Appointments 5/8/2018  9:40 AM  
Follow Up with Merlin Nielsen, MD  
[address] [facility] [address]) Appt Note: follow up neuropathy [address] [facility] [address] [address]  
[place] [facility] [address] [address] Upcoming Health Maintenance Date Due Hepatitis C Screening 1950 DTaP/Tdap/Td series (1 - Tdap) 3/9/1971 BREAST CANCER SCRN MAMMOGRAM 3/9/2000 FOBT Q 1 YEAR AGE 50-75 3/9/2000 ZOSTER VACCINE AGE 60> 1/9/2010 GLAUCOMA SCREENING Q2Y 3/9/2015 Pneumococcal 65+ Low/Medium Risk (1 of 2 - PCV13) 3/9/2015 Influenza Age 5 to Adult 8/1/2017 MEDICARE YEARLY EXAM 3/14/2018 Allergies as of 3/21/2018  Review Complete On: 3/21/2018 By: Chandu Hurt Severity Noted Reaction Type Reactions Sulfa (Sulfonamide Antibiotics)  07/16/2010    Hives Current Immunizations  Never Reviewed No immunizations on file. Not reviewed this visit You Were Diagnosed With   
  
 Codes Comments Essential hypertension    -  Primary ICD-10-CM: I10 
ICD-9-CM: 401.9 Dyslipidemia     ICD-10-CM: E78.5 ICD-9-CM: 272.4 PVC (premature ventricular contraction)     ICD-10-CM: I49.3 ICD-9-CM: 427.69 Vitals BP Pulse Resp Height(growth percentile) Weight(growth percentile) SpO2  
 120/72 (BP 1 Location: Left arm) 88 18 5' 4\" (1.626 m) 176 lb 6.4 oz (80 kg) 97% BMI OB Status Smoking Status 30.28 kg/m2 Hysterectomy Never Smoker Vitals History BMI and BSA Data Body Mass Index Body Surface Area  
 30.28 kg/m 2 1.9 m 2 Preferred Pharmacy Pharmacy Name Phone  [facility] [phone] Your Updated Medication List  
  
   
This list is accurate as of 3/21/18  3:48 PM.  Always use your most recent med list.  
  
  
  
  
 aspirin delayed-release 81 mg tablet Take 162 mg by mouth nightly. atorvastatin 10 mg tablet Commonly known as:  LIPITOR  
TAKE 1 TABLET NIGHTLY  
  
 cholecalciferol (VITAMIN D3) 5,000 unit Tab tablet Commonly known as:  VITAMIN D3 Take 5,000 Units by mouth daily. * CYMBALTA 30 mg capsule Generic drug:  DULoxetine Take 30 mg by mouth daily. * DULoxetine 60 mg capsule Commonly known as:  CYMBALTA Take 1 Cap by mouth daily. Start taking on:  4/7/2018 EVISTA 60 mg tablet Generic drug:  raloxifene Take 60 mg by mouth daily. fenofibrate nanocrystallized 145 mg tablet Commonly known as:  [employer] Take 1 Tab by mouth daily. levothyroxine 137 mcg tablet Commonly known as:  SYNTHROID Take 137 mcg by mouth Daily (before breakfast). losartan 50 mg tablet Commonly known as:  COZAAR Take 1 Tab by mouth daily. metFORMIN 1,000 mg tablet Commonly known as:  GLUCOPHAGE Take 1 Tab by mouth two (2) times daily (with meals). metoprolol succinate 25 mg XL tablet Commonly known as:  TOPROL-XL Take 1 Tab by mouth daily. omega-3 acid ethyl esters 1 gram capsule Commonly known as:  [place] [employer] Three capsules by mouth daily. VITAMIN B-12 INJECTION  
by Injection route every fourteen (14) days. * Notice: This list has 2 medication(s) that are the same as other medications prescribed for you. Read the directions carefully, and ask your doctor or other care provider to review them with you. To-Do List   
 03/26/2018 9:30 AM  
  Appointment with [facility] [place] [facility] at [address] [place] [address] ([phone]) 03/26/2018 11:30 AM  
  Appointment with [facility] [place] PAT EXAM RM 3 at [address] ([phone]) Introducing [facility]! Dear Catherine Chavez: Thank you for requesting a [employer] account. Our records indicate that you already have an active [employer] account. You can access your account anytime at [email or web address] Did you know that you can access your hospital and ER discharge instructions at any time in [employer]? You can also review all of your test results from your hospital stay or ER visit. Additional Information If you have questions, please visit the Frequently Asked Questions section of the [employer] website at [email or web address] Remember, [employer] is NOT to be used for urgent needs. For medical emergencies, dial 911. Now available from your iPhone and Android! Please provide this summary of care documentation to your next provider. Your primary care clinician is listed as Chaparrita Pedersen. If you have any questions after today's visit, please call [phone].

## 2018-03-21 NOTE — PROGRESS NOTES
Dustin High MD    [address] [place] [facility], [address] [facility]    Office [phone],GZK [phone]  Pager [phone]    Brianna Kohli is a 76 y.o. female is here for f/u visit . 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    Insulin resistance E88.81    Vitamin D deficiency E55.9    Elevated triglycerides with high cholesterol E78.2    Fibromyalgia M79.7    Spinal stenosis of cervical region M48.02    Facet arthropathy, cervical M12.88    Lumbar facet arthropathy M12.88    Bilateral arm pain M79.601, M79.602    Bilateral carpal tunnel syndrome G56.03    Hx of cardiomyopathy Z86.79    Essential hypertension I10    History of ventricular tachycardia Z86.79    Lumbar radiculopathy, right M54.16    Sensorimotor neuropathy G62.9    Radicular syndrome of right leg M54.10       Chief complaint:  Chief Complaint   Patient presents with    Follow-up     follow up to monitor       Assessment:    Hx of PVC/nonsustained VT - was on Mexiletine. It was discontinued because Holter monitor did not show any PVCs/significant abnormal rhythms. After discontinuing mexiletine patient will E cardio event monitor. Hx of non ischemic cardiomyopathy -resolved ;normal EF on ECHO 11/2014 ; 10/2016 ; 11/8/17  Hx of orthostasis/dizziness - currently aysmpotmatic  HTN  HLD  Preop cardiovascular evaluation prior to right TKR by Dr. Rochelle Chavez:     Continue antihypertensives ( on B Blocker).   E cardio event monitor after stopping mexiletine-sinus rhythm/1 PVC/no significant arrhythmias  11/8/17  Normal stress study. 10/2016   EF normal in echo. Class 1 risk - 0 risk factors (0.4%)   from cardiac standpoint for surgery as per Tommy's Revised cardiac risk  index. D/w pt. Patient understands the cardiac risk and wishes to proceed. Can proceed with surgery (TKR). F/u 1 year    Patient understands the plan. All questions were answered to the patient's satisfaction. Medication Side Effects and Warnings were discussed with patient: yes  Patient Labs were reviewed and or requested:  yes  Patient Past Records were reviewed and or requested: yes    I appreciate the opportunity to be involved in Ms. Alva Rivera. See note below for details. Please do not hesitate to contact us with questions or concerns. Everette Nyhan, MD    Cardiac Testing/ Procedures: A. Cardiac Cath/PCI:    B.ECHO/ISAAC:  11/8/17 - EF 55-60%/Grade 1 DD  10/5/16-EF 29-39%, grade 1 diastolic dysfunction, mild TR  11/21/14Left ventricle: Systolic function was normal. Ejection fraction was  estimated to be 55 %. There were no regional wall motion abnormalities. Doppler parameters were consistent with abnormal left ventricular  relaxation (grade 1 diastolic dysfunction). Left atrium: The atrium was mildly dilated. Mitral valve: There was mild regurgitation. Tricuspid valve: There was mild regurgitation. 10/2013 - EF 55%    7/2007 - EF 45%    C. StressNuclear/Stress ECHO/Stress test: 11/8/17 - Treadmill test - 4.39 min/nml  04/99 - Persantine cardiolite - EF 48%/No inducible ischemia  D. Vascular:    E. EP: Hx on nonsustained VT/PVC- on mexilitine  12/4/98 - tilt test with marked heart rate variablility and drop in BP without hemodynamic collapse    8/9/16 - Holter - One blocked P wave ( 4.50 AM) ; no PV; SR  2/11/18 to 3/12/18E  cardio event monitor-sinus rhythm/one PVC    F. Miscellaneous:    Subjective:  Yee Monterroso is a 76 y.o. female who returns for follow up  Visit.  Doing well  No papitations/dizziness  No CP/dyspnea  Has been having right knee pain. Complains of fatigue. ROS:  (bold if positive, if negative)             Medications before admission:    Current Outpatient Prescriptions   Medication Sig Dispense    [START ON 4/7/2018] DULoxetine (CYMBALTA) 60 mg capsule Take 1 Cap by mouth daily. 30 Cap    metoprolol succinate (TOPROL-XL) 25 mg XL tablet Take 1 Tab by mouth daily. 90 Tab    losartan (COZAAR) 50 mg tablet Take 1 Tab by mouth daily. 90 Tab    fenofibrate nanocrystallized (TRICOR) 145 mg tablet Take 1 Tab by mouth daily. 90 Tab    metFORMIN (GLUCOPHAGE) 1,000 mg tablet Take 1 Tab by mouth two (2) times daily (with meals). 180 Tab    atorvastatin (LIPITOR) 10 mg tablet TAKE 1 TABLET NIGHTLY 90 Tab    omega-3 acid ethyl esters (LOVAZA) 1 gram capsule Three capsules by mouth daily. 90 Cap    aspirin delayed-release 81 mg tablet Take 162 mg by mouth nightly.  levothyroxine (SYNTHROID) 137 mcg tablet Take 137 mcg by mouth Daily (before breakfast).  cholecalciferol, VITAMIN D3, (VITAMIN D3) 5,000 unit tab tablet Take 5,000 Units by mouth daily.  raloxifene (EVISTA) 60 mg tablet Take 60 mg by mouth daily.  CYANOCOBALAMIN (VITAMIN B-12 IJ) by Injection route every fourteen (14) days. No current facility-administered medications for this visit. Physical Exam:  Visit Vitals    Ht 5' 4\" (1.626 m)    Wt 176 lb 6.4 oz (80 kg)    BMI 30.28 kg/m2          Gen: Well-developed, well-nourished, in no acute distress  Neck: Supple,No JVD, No Carotid Bruit,   Resp: No accessory muscle use, Clear breath sounds, No rales or rhonchi  Card: Regular Rate,Rythm,Normal S1, S2, No murmurs, rubs or gallop. No thrills.    Abd:  Soft, non-tender, non-distended,BS+,   MSK: No cyanosis  Skin: No rashes    Neuro: moving all four extremities , follows commands appropriately  Psych:  Good insight, oriented to person, place , alert, Nml Affect  LE: No edema    EKG:       LABS:        Lab Results   Component Value Date/Time    WBC 7.3 04/08/2016 11:53 AM    HGB 12.1 04/08/2016 11:53 AM    HCT 38.4 04/08/2016 11:53 AM    PLATELET 898 53/22/8430 11:53 AM    MCV 88.5 04/08/2016 11:53 AM     Lab Results   Component Value Date/Time    Sodium 141 02/07/2018 07:54 AM    Potassium 4.4 02/07/2018 07:54 AM    Chloride 102 02/07/2018 07:54 AM    CO2 22 02/07/2018 07:54 AM    Anion gap 12 09/09/2016 11:23 AM    Glucose 86 02/07/2018 07:54 AM    BUN 16 02/07/2018 07:54 AM    Creatinine 0.57 02/07/2018 07:54 AM    BUN/Creatinine ratio 28 02/07/2018 07:54 AM    GFR est  02/07/2018 07:54 AM    GFR est non-AA 96 02/07/2018 07:54 AM    Calcium 9.7 02/07/2018 07:54 AM       Lab Results   Component Value Date/Time    aPTT 25.2 09/14/2015 12:06 PM     Lab Results   Component Value Date/Time    INR 1.0 09/14/2015 12:06 PM    Prothrombin time 10.4 09/14/2015 12:06 PM     No components found for: James Barnard MD

## 2018-03-26 ENCOUNTER — HOSPITAL ENCOUNTER (OUTPATIENT)
Dept: PREADMISSION TESTING | Age: 68
Discharge: HOME | End: 2018-03-26
Payer: MEDICARE

## 2018-03-26 VITALS
WEIGHT: 171 LBS | HEART RATE: 79 BPM | HEIGHT: 64 IN | SYSTOLIC BLOOD PRESSURE: 118 MMHG | BODY MASS INDEX: 29.19 KG/M2 | DIASTOLIC BLOOD PRESSURE: 75 MMHG | TEMPERATURE: 98.2 F

## 2018-03-26 DIAGNOSIS — Z86.14 HX MRSA INFECTION: ICD-10-CM

## 2018-03-26 LAB
ANION GAP SERPL CALC-SCNC: 9 MMOL/L (ref 5–15)
APPEARANCE UR: ABNORMAL
BACTERIA URNS QL MICRO: ABNORMAL /HPF
BILIRUB UR QL: NEGATIVE
BUN SERPL-MCNC: 14 MG/DL (ref 6–20)
BUN/CREAT SERPL: 26 (ref 12–20)
CALCIUM SERPL-MCNC: 9.3 MG/DL (ref 8.5–10.1)
CHLORIDE SERPL-SCNC: 106 MMOL/L (ref 97–108)
CO2 SERPL-SCNC: 25 MMOL/L (ref 21–32)
COLOR UR: ABNORMAL
CREAT SERPL-MCNC: 0.53 MG/DL (ref 0.55–1.02)
EPITH CASTS URNS QL MICRO: ABNORMAL /LPF
ERYTHROCYTE [DISTWIDTH] IN BLOOD BY AUTOMATED COUNT: 14.4 % (ref 11.5–14.5)
EST. AVERAGE GLUCOSE BLD GHB EST-MCNC: 108 MG/DL
GLUCOSE SERPL-MCNC: 70 MG/DL (ref 65–100)
GLUCOSE UR STRIP.AUTO-MCNC: NEGATIVE MG/DL
HBA1C MFR BLD: 5.4 % (ref 4.2–6.3)
HCT VFR BLD AUTO: 37.7 % (ref 35–47)
HGB BLD-MCNC: 11.6 G/DL (ref 11.5–16)
HGB UR QL STRIP: NEGATIVE
HYALINE CASTS URNS QL MICRO: ABNORMAL /LPF (ref 0–5)
INR PPP: 1.1 (ref 0.9–1.1)
KETONES UR QL STRIP.AUTO: NEGATIVE MG/DL
LEUKOCYTE ESTERASE UR QL STRIP.AUTO: NEGATIVE
MCH RBC QN AUTO: 28 PG (ref 26–34)
MCHC RBC AUTO-ENTMCNC: 30.8 G/DL (ref 30–36.5)
MCV RBC AUTO: 91.1 FL (ref 80–99)
NITRITE UR QL STRIP.AUTO: NEGATIVE
NRBC # BLD: 0 K/UL (ref 0–0.01)
NRBC BLD-RTO: 0 PER 100 WBC
PH UR STRIP: 6.5 [PH] (ref 5–8)
PLATELET # BLD AUTO: 375 K/UL (ref 150–400)
PMV BLD AUTO: 9.9 FL (ref 8.9–12.9)
POTASSIUM SERPL-SCNC: 4.1 MMOL/L (ref 3.5–5.1)
PROT UR STRIP-MCNC: NEGATIVE MG/DL
PROTHROMBIN TIME: 11.2 SEC (ref 9–11.1)
RBC # BLD AUTO: 4.14 M/UL (ref 3.8–5.2)
RBC #/AREA URNS HPF: ABNORMAL /HPF (ref 0–5)
SODIUM SERPL-SCNC: 140 MMOL/L (ref 136–145)
SP GR UR REFRACTOMETRY: 1.02 (ref 1–1.03)
UA: UC IF INDICATED,UAUC: ABNORMAL
UROBILINOGEN UR QL STRIP.AUTO: 1 EU/DL (ref 0.2–1)
WBC # BLD AUTO: 8.6 K/UL (ref 3.6–11)
WBC URNS QL MICRO: ABNORMAL /HPF (ref 0–4)

## 2018-03-26 PROCEDURE — 87186 SC STD MICRODIL/AGAR DIL: CPT | Performed by: ORTHOPAEDIC SURGERY

## 2018-03-26 PROCEDURE — 36415 COLL VENOUS BLD VENIPUNCTURE: CPT | Performed by: ORTHOPAEDIC SURGERY

## 2018-03-26 PROCEDURE — 85610 PROTHROMBIN TIME: CPT | Performed by: ORTHOPAEDIC SURGERY

## 2018-03-26 PROCEDURE — 85027 COMPLETE CBC AUTOMATED: CPT | Performed by: ORTHOPAEDIC SURGERY

## 2018-03-26 PROCEDURE — 87086 URINE CULTURE/COLONY COUNT: CPT | Performed by: ORTHOPAEDIC SURGERY

## 2018-03-26 PROCEDURE — 93005 ELECTROCARDIOGRAM TRACING: CPT

## 2018-03-26 PROCEDURE — 86900 BLOOD TYPING SEROLOGIC ABO: CPT | Performed by: ORTHOPAEDIC SURGERY

## 2018-03-26 PROCEDURE — 80048 BASIC METABOLIC PNL TOTAL CA: CPT | Performed by: ORTHOPAEDIC SURGERY

## 2018-03-26 PROCEDURE — 87077 CULTURE AEROBIC IDENTIFY: CPT | Performed by: ORTHOPAEDIC SURGERY

## 2018-03-26 PROCEDURE — 83036 HEMOGLOBIN GLYCOSYLATED A1C: CPT | Performed by: ORTHOPAEDIC SURGERY

## 2018-03-26 PROCEDURE — 81001 URINALYSIS AUTO W/SCOPE: CPT | Performed by: ORTHOPAEDIC SURGERY

## 2018-03-26 NOTE — ADVANCED PRACTICE NURSE
Preoperative instructions reviewed with patient. Patient given 2-6 packs of CHG wipes. Instructions reviewed in class on use of CHG wipes. Patient given SSI infection FAQS sheet, as well as a  MRSA/MSSA treatment instruction sheet  With an explanation to patient that they will be notified if treatment instructions need to be initiated. Patient was given the opportunity to ask questions on the information provided. Orthopedic pre-op assessment and planning form sent for scanning.

## 2018-03-27 LAB
ABO + RH BLD: NORMAL
ATRIAL RATE: 78 BPM
BACTERIA SPEC CULT: NORMAL
BACTERIA SPEC CULT: NORMAL
BLOOD GROUP ANTIBODIES SERPL: NORMAL
CALCULATED P AXIS, ECG09: 14 DEGREES
CALCULATED R AXIS, ECG10: 4 DEGREES
CALCULATED T AXIS, ECG11: 33 DEGREES
DIAGNOSIS, 93000: NORMAL
P-R INTERVAL, ECG05: 166 MS
Q-T INTERVAL, ECG07: 420 MS
QRS DURATION, ECG06: 84 MS
QTC CALCULATION (BEZET), ECG08: 478 MS
SERVICE CMNT-IMP: NORMAL
SPECIMEN EXP DATE BLD: NORMAL
VENTRICULAR RATE, ECG03: 78 BPM

## 2018-03-27 RX ORDER — ATORVASTATIN CALCIUM 10 MG/1
TABLET, FILM COATED ORAL
Qty: 90 TAB | Refills: 3 | Status: ON HOLD | OUTPATIENT
Start: 2018-03-27 | End: 2018-04-09 | Stop reason: SDUPTHER

## 2018-03-27 NOTE — TELEPHONE ENCOUNTER
Refill is per verbal order of Dr. Maurice Alanis.     Requested Prescriptions     Pending Prescriptions Disp Refills    atorvastatin (LIPITOR) 10 mg tablet [Pharmacy Med Name: ATORVASTATIN TAB 10MG] 90 Tab 3     Sig: TAKE 1 TABLET NIGHTLY

## 2018-03-28 LAB
BACTERIA SPEC CULT: ABNORMAL
CC UR VC: ABNORMAL
SERVICE CMNT-IMP: ABNORMAL

## 2018-03-29 DIAGNOSIS — I42.8 OTHER CARDIOMYOPATHY (HCC): ICD-10-CM

## 2018-03-29 DIAGNOSIS — I49.3 PVC (PREMATURE VENTRICULAR CONTRACTION): ICD-10-CM

## 2018-03-29 RX ORDER — CEPHALEXIN 250 MG/1
500 CAPSULE ORAL 2 TIMES DAILY
Qty: 28 CAP | Refills: 0 | Status: SHIPPED | OUTPATIENT
Start: 2018-03-29 | End: 2018-04-05

## 2018-03-29 NOTE — ADVANCED PRACTICE NURSE
Patient is a 75 y/o female who was seen standard pre-op appointment on (date of visit). Reviewed urine culture results on 3/29/18 and results were >100,000 colonies E. Coli. Prescription for keflex 500 mg bid x 7 days e-prescribed to [address] [place] [address]. Patient notified of prescription and possible side effects, and verbalized understanding of treatment regimen, goals of therapy, and UTI preventive measures. Provided contact info for further questions and reasons to follow up with PCP/surgeon, if needed.   SHANDRA Valverde-C

## 2018-03-31 RX ORDER — METFORMIN HYDROCHLORIDE 1000 MG/1
1000 TABLET ORAL 2 TIMES DAILY WITH MEALS
Qty: 180 TAB | Refills: 3 | Status: SHIPPED | OUTPATIENT
Start: 2018-03-31 | End: 2021-09-07 | Stop reason: DRUGHIGH

## 2018-03-31 RX ORDER — METOPROLOL SUCCINATE 25 MG/1
25 TABLET, EXTENDED RELEASE ORAL DAILY
Qty: 90 TAB | Refills: 3 | Status: SHIPPED | OUTPATIENT
Start: 2018-03-31 | End: 2019-02-23 | Stop reason: SDUPTHER

## 2018-03-31 NOTE — TELEPHONE ENCOUNTER
From: Percy Cisneros  To: Dustin High MD  Sent: 3/29/2018 3:42 PM EDT  Subject: Medication Renewal Request    Original authorizing provider: MD Percy Godfrey Risk would like a refill of the following medications:  metoprolol succinate (TOPROL-XL) 25 mg XL tablet Dustin High MD]  metFORMIN (GLUCOPHAGE) 1,000 mg tablet Dustin High MD]    [address]    Comment:

## 2018-04-06 ENCOUNTER — ANESTHESIA EVENT (OUTPATIENT)
Dept: SURGERY | Age: 68
End: 2018-04-06
Payer: MEDICARE

## 2018-04-09 ENCOUNTER — HOSPITAL ENCOUNTER (OUTPATIENT)
Age: 68
Setting detail: OBSERVATION
Discharge: HOME HEALTH | End: 2018-04-10
Attending: ORTHOPAEDIC SURGERY | Admitting: ORTHOPAEDIC SURGERY
Payer: MEDICARE

## 2018-04-09 ENCOUNTER — ANESTHESIA (OUTPATIENT)
Dept: SURGERY | Age: 68
End: 2018-04-09
Payer: MEDICARE

## 2018-04-09 DIAGNOSIS — M25.561 CHRONIC PAIN OF RIGHT KNEE: Primary | ICD-10-CM

## 2018-04-09 DIAGNOSIS — G89.29 CHRONIC PAIN OF RIGHT KNEE: Primary | ICD-10-CM

## 2018-04-09 PROBLEM — M17.11 ARTHRITIS OF RIGHT KNEE: Status: ACTIVE | Noted: 2018-04-09

## 2018-04-09 LAB
GLUCOSE BLD STRIP.AUTO-MCNC: 110 MG/DL (ref 65–100)
GLUCOSE BLD STRIP.AUTO-MCNC: 121 MG/DL (ref 65–100)
GLUCOSE BLD STRIP.AUTO-MCNC: 95 MG/DL (ref 65–100)
SERVICE CMNT-IMP: ABNORMAL
SERVICE CMNT-IMP: ABNORMAL
SERVICE CMNT-IMP: NORMAL

## 2018-04-09 PROCEDURE — 97116 GAIT TRAINING THERAPY: CPT

## 2018-04-09 PROCEDURE — 74011250636 HC RX REV CODE- 250/636

## 2018-04-09 PROCEDURE — 99218 HC RM OBSERVATION: CPT

## 2018-04-09 PROCEDURE — 74011250636 HC RX REV CODE- 250/636: Performed by: PHYSICIAN ASSISTANT

## 2018-04-09 PROCEDURE — 74011000258 HC RX REV CODE- 258

## 2018-04-09 PROCEDURE — 77030028224 HC PDNG CST BSNM -A: Performed by: ORTHOPAEDIC SURGERY

## 2018-04-09 PROCEDURE — 77030008467 HC STPLR SKN COVD -B: Performed by: ORTHOPAEDIC SURGERY

## 2018-04-09 PROCEDURE — 76060000034 HC ANESTHESIA 1.5 TO 2 HR: Performed by: ORTHOPAEDIC SURGERY

## 2018-04-09 PROCEDURE — 74011000250 HC RX REV CODE- 250

## 2018-04-09 PROCEDURE — 77030018822 HC SLV COMPR FT COVD -B

## 2018-04-09 PROCEDURE — 82962 GLUCOSE BLOOD TEST: CPT

## 2018-04-09 PROCEDURE — 74011250637 HC RX REV CODE- 250/637: Performed by: PHYSICIAN ASSISTANT

## 2018-04-09 PROCEDURE — 77030011640 HC PAD GRND REM COVD -A: Performed by: ORTHOPAEDIC SURGERY

## 2018-04-09 PROCEDURE — 77030035236 HC SUT PDS STRATFX BARB J&J -B: Performed by: ORTHOPAEDIC SURGERY

## 2018-04-09 PROCEDURE — 77030000032 HC CUF TRNQT ZIMM -B: Performed by: ORTHOPAEDIC SURGERY

## 2018-04-09 PROCEDURE — G8978 MOBILITY CURRENT STATUS: HCPCS

## 2018-04-09 PROCEDURE — 74011250636 HC RX REV CODE- 250/636: Performed by: ORTHOPAEDIC SURGERY

## 2018-04-09 PROCEDURE — 77030018836 HC SOL IRR NACL ICUM -A: Performed by: ORTHOPAEDIC SURGERY

## 2018-04-09 PROCEDURE — G8979 MOBILITY GOAL STATUS: HCPCS

## 2018-04-09 PROCEDURE — 77030014077 HC TOWER MX CEM J&J -C: Performed by: ORTHOPAEDIC SURGERY

## 2018-04-09 PROCEDURE — 74011000250 HC RX REV CODE- 250: Performed by: ORTHOPAEDIC SURGERY

## 2018-04-09 PROCEDURE — 74011250636 HC RX REV CODE- 250/636: Performed by: ANESTHESIOLOGY

## 2018-04-09 PROCEDURE — C1776 JOINT DEVICE (IMPLANTABLE): HCPCS | Performed by: ORTHOPAEDIC SURGERY

## 2018-04-09 PROCEDURE — 76210000001 HC OR PH I REC 2.5 TO 3 HR: Performed by: ORTHOPAEDIC SURGERY

## 2018-04-09 PROCEDURE — 77030006822 HC BLD SAW SAG BRSM -B: Performed by: ORTHOPAEDIC SURGERY

## 2018-04-09 PROCEDURE — 97161 PT EVAL LOW COMPLEX 20 MIN: CPT

## 2018-04-09 PROCEDURE — 76010000162 HC OR TIME 1.5 TO 2 HR INTENSV-TIER 1: Performed by: ORTHOPAEDIC SURGERY

## 2018-04-09 PROCEDURE — 77030018846 HC SOL IRR STRL H20 ICUM -A: Performed by: ORTHOPAEDIC SURGERY

## 2018-04-09 PROCEDURE — C1713 ANCHOR/SCREW BN/BN,TIS/BN: HCPCS | Performed by: ORTHOPAEDIC SURGERY

## 2018-04-09 PROCEDURE — 77030007866 HC KT SPN ANES BBMI -B: Performed by: ANESTHESIOLOGY

## 2018-04-09 PROCEDURE — 77030034850: Performed by: ORTHOPAEDIC SURGERY

## 2018-04-09 PROCEDURE — 77030031139 HC SUT VCRL2 J&J -A: Performed by: ORTHOPAEDIC SURGERY

## 2018-04-09 DEVICE — CEMENT BNE 40GM FULL DOSE PMMA W/ GENT HI VISC RADPQ LNG: Type: IMPLANTABLE DEVICE | Site: KNEE | Status: FUNCTIONAL

## 2018-04-09 DEVICE — COMPONENT FEM SZ F R ZMLY PC CEM POST STBL PRI FEM NXGN: Type: IMPLANTABLE DEVICE | Site: KNEE | Status: FUNCTIONAL

## 2018-04-09 DEVICE — PLUG STEM TIV FLX TAPR FOR MG II ST BNE SCR NXGN: Type: IMPLANTABLE DEVICE | Site: KNEE | Status: FUNCTIONAL

## 2018-04-09 DEVICE — PLATE TIB STEM PC NXGN SZ 3 --: Type: IMPLANTABLE DEVICE | Site: KNEE | Status: FUNCTIONAL

## 2018-04-09 DEVICE — PAT INST NXGN 32MM POLYETH --: Type: IMPLANTABLE DEVICE | Site: KNEE | Status: FUNCTIONAL

## 2018-04-09 RX ORDER — POLYETHYLENE GLYCOL 3350 17 G/17G
17 POWDER, FOR SOLUTION ORAL DAILY
Status: DISCONTINUED | OUTPATIENT
Start: 2018-04-10 | End: 2018-04-10 | Stop reason: HOSPADM

## 2018-04-09 RX ORDER — CEFAZOLIN SODIUM/WATER 2 G/20 ML
2 SYRINGE (ML) INTRAVENOUS EVERY 8 HOURS
Status: COMPLETED | OUTPATIENT
Start: 2018-04-09 | End: 2018-04-10

## 2018-04-09 RX ORDER — METOPROLOL SUCCINATE 25 MG/1
25 TABLET, EXTENDED RELEASE ORAL DAILY
Status: DISCONTINUED | OUTPATIENT
Start: 2018-04-10 | End: 2018-04-10 | Stop reason: HOSPADM

## 2018-04-09 RX ORDER — PROPOFOL 10 MG/ML
INJECTION, EMULSION INTRAVENOUS
Status: DISCONTINUED | OUTPATIENT
Start: 2018-04-09 | End: 2018-04-09 | Stop reason: HOSPADM

## 2018-04-09 RX ORDER — MIDAZOLAM HYDROCHLORIDE 1 MG/ML
1 INJECTION, SOLUTION INTRAMUSCULAR; INTRAVENOUS AS NEEDED
Status: DISCONTINUED | OUTPATIENT
Start: 2018-04-09 | End: 2018-04-09 | Stop reason: HOSPADM

## 2018-04-09 RX ORDER — PREGABALIN 75 MG/1
75 CAPSULE ORAL ONCE
Status: DISCONTINUED | OUTPATIENT
Start: 2018-04-09 | End: 2018-04-09 | Stop reason: HOSPADM

## 2018-04-09 RX ORDER — ACETAMINOPHEN 325 MG/1
650 TABLET ORAL EVERY 6 HOURS
Status: DISCONTINUED | OUTPATIENT
Start: 2018-04-09 | End: 2018-04-10 | Stop reason: HOSPADM

## 2018-04-09 RX ORDER — BUPIVACAINE HYDROCHLORIDE 5 MG/ML
INJECTION, SOLUTION EPIDURAL; INTRACAUDAL AS NEEDED
Status: DISCONTINUED | OUTPATIENT
Start: 2018-04-09 | End: 2018-04-09 | Stop reason: HOSPADM

## 2018-04-09 RX ORDER — CEFAZOLIN SODIUM/WATER 2 G/20 ML
2 SYRINGE (ML) INTRAVENOUS ONCE
Status: DISCONTINUED | OUTPATIENT
Start: 2018-04-09 | End: 2018-04-09 | Stop reason: HOSPADM

## 2018-04-09 RX ORDER — GLYCOPYRROLATE 0.2 MG/ML
0.2 INJECTION INTRAMUSCULAR; INTRAVENOUS
Status: DISCONTINUED | OUTPATIENT
Start: 2018-04-09 | End: 2018-04-09 | Stop reason: HOSPADM

## 2018-04-09 RX ORDER — NALOXONE HYDROCHLORIDE 0.4 MG/ML
0.4 INJECTION, SOLUTION INTRAMUSCULAR; INTRAVENOUS; SUBCUTANEOUS AS NEEDED
Status: DISCONTINUED | OUTPATIENT
Start: 2018-04-09 | End: 2018-04-10 | Stop reason: HOSPADM

## 2018-04-09 RX ORDER — EPHEDRINE SULFATE 50 MG/ML
5 INJECTION, SOLUTION INTRAVENOUS AS NEEDED
Status: DISCONTINUED | OUTPATIENT
Start: 2018-04-09 | End: 2018-04-09 | Stop reason: HOSPADM

## 2018-04-09 RX ORDER — OXYCODONE HCL 10 MG/1
10 TABLET, FILM COATED, EXTENDED RELEASE ORAL ONCE
Status: DISCONTINUED | OUTPATIENT
Start: 2018-04-09 | End: 2018-04-09 | Stop reason: HOSPADM

## 2018-04-09 RX ORDER — ROPIVACAINE HYDROCHLORIDE 5 MG/ML
30 INJECTION, SOLUTION EPIDURAL; INFILTRATION; PERINEURAL AS NEEDED
Status: DISCONTINUED | OUTPATIENT
Start: 2018-04-09 | End: 2018-04-09 | Stop reason: HOSPADM

## 2018-04-09 RX ORDER — ONDANSETRON 2 MG/ML
4 INJECTION INTRAMUSCULAR; INTRAVENOUS
Status: DISCONTINUED | OUTPATIENT
Start: 2018-04-09 | End: 2018-04-10 | Stop reason: HOSPADM

## 2018-04-09 RX ORDER — HYDROCODONE BITARTRATE AND ACETAMINOPHEN 5; 325 MG/1; MG/1
1 TABLET ORAL AS NEEDED
Status: DISCONTINUED | OUTPATIENT
Start: 2018-04-09 | End: 2018-04-09 | Stop reason: HOSPADM

## 2018-04-09 RX ORDER — SODIUM CHLORIDE 9 MG/ML
25 INJECTION, SOLUTION INTRAVENOUS CONTINUOUS
Status: DISCONTINUED | OUTPATIENT
Start: 2018-04-09 | End: 2018-04-09 | Stop reason: HOSPADM

## 2018-04-09 RX ORDER — METFORMIN HYDROCHLORIDE 500 MG/1
1000 TABLET ORAL 2 TIMES DAILY WITH MEALS
Status: DISCONTINUED | OUTPATIENT
Start: 2018-04-10 | End: 2018-04-10 | Stop reason: HOSPADM

## 2018-04-09 RX ORDER — SODIUM CHLORIDE 9 MG/ML
125 INJECTION, SOLUTION INTRAVENOUS CONTINUOUS
Status: DISPENSED | OUTPATIENT
Start: 2018-04-09 | End: 2018-04-10

## 2018-04-09 RX ORDER — SODIUM CHLORIDE, SODIUM LACTATE, POTASSIUM CHLORIDE, CALCIUM CHLORIDE 600; 310; 30; 20 MG/100ML; MG/100ML; MG/100ML; MG/100ML
INJECTION, SOLUTION INTRAVENOUS
Status: DISCONTINUED | OUTPATIENT
Start: 2018-04-09 | End: 2018-04-09 | Stop reason: HOSPADM

## 2018-04-09 RX ORDER — DULOXETIN HYDROCHLORIDE 30 MG/1
30 CAPSULE, DELAYED RELEASE ORAL DAILY
Status: DISCONTINUED | OUTPATIENT
Start: 2018-04-10 | End: 2018-04-10 | Stop reason: HOSPADM

## 2018-04-09 RX ORDER — OXYCODONE HYDROCHLORIDE 5 MG/1
10 TABLET ORAL
Status: DISCONTINUED | OUTPATIENT
Start: 2018-04-09 | End: 2018-04-10 | Stop reason: HOSPADM

## 2018-04-09 RX ORDER — MORPHINE SULFATE 10 MG/ML
2 INJECTION, SOLUTION INTRAMUSCULAR; INTRAVENOUS
Status: DISCONTINUED | OUTPATIENT
Start: 2018-04-09 | End: 2018-04-09 | Stop reason: HOSPADM

## 2018-04-09 RX ORDER — FAMOTIDINE 20 MG/1
20 TABLET, FILM COATED ORAL 2 TIMES DAILY
Status: DISCONTINUED | OUTPATIENT
Start: 2018-04-09 | End: 2018-04-10 | Stop reason: HOSPADM

## 2018-04-09 RX ORDER — CEFAZOLIN SODIUM 1 G/3ML
INJECTION, POWDER, FOR SOLUTION INTRAMUSCULAR; INTRAVENOUS AS NEEDED
Status: DISCONTINUED | OUTPATIENT
Start: 2018-04-09 | End: 2018-04-09 | Stop reason: HOSPADM

## 2018-04-09 RX ORDER — AMOXICILLIN 250 MG
1 CAPSULE ORAL 2 TIMES DAILY
Status: DISCONTINUED | OUTPATIENT
Start: 2018-04-09 | End: 2018-04-10 | Stop reason: HOSPADM

## 2018-04-09 RX ORDER — ATORVASTATIN CALCIUM 10 MG/1
10 TABLET, FILM COATED ORAL
Status: DISCONTINUED | OUTPATIENT
Start: 2018-04-09 | End: 2018-04-10 | Stop reason: HOSPADM

## 2018-04-09 RX ORDER — DIPHENHYDRAMINE HCL 12.5MG/5ML
12.5 ELIXIR ORAL
Status: DISCONTINUED | OUTPATIENT
Start: 2018-04-09 | End: 2018-04-10 | Stop reason: HOSPADM

## 2018-04-09 RX ORDER — DEXAMETHASONE SODIUM PHOSPHATE 10 MG/ML
6 INJECTION INTRAMUSCULAR; INTRAVENOUS ONCE
Status: COMPLETED | OUTPATIENT
Start: 2018-04-09 | End: 2018-04-09

## 2018-04-09 RX ORDER — RALOXIFENE HYDROCHLORIDE 60 MG/1
60 TABLET, FILM COATED ORAL DAILY
Status: DISCONTINUED | OUTPATIENT
Start: 2018-04-10 | End: 2018-04-10 | Stop reason: HOSPADM

## 2018-04-09 RX ORDER — KETAMINE HYDROCHLORIDE 50 MG/ML
INJECTION, SOLUTION INTRAMUSCULAR; INTRAVENOUS AS NEEDED
Status: DISCONTINUED | OUTPATIENT
Start: 2018-04-09 | End: 2018-04-09 | Stop reason: HOSPADM

## 2018-04-09 RX ORDER — SODIUM CHLORIDE 0.9 % (FLUSH) 0.9 %
5-10 SYRINGE (ML) INJECTION EVERY 8 HOURS
Status: DISCONTINUED | OUTPATIENT
Start: 2018-04-10 | End: 2018-04-10 | Stop reason: HOSPADM

## 2018-04-09 RX ORDER — ONDANSETRON 2 MG/ML
INJECTION INTRAMUSCULAR; INTRAVENOUS AS NEEDED
Status: DISCONTINUED | OUTPATIENT
Start: 2018-04-09 | End: 2018-04-09 | Stop reason: HOSPADM

## 2018-04-09 RX ORDER — FENTANYL CITRATE 50 UG/ML
50 INJECTION, SOLUTION INTRAMUSCULAR; INTRAVENOUS AS NEEDED
Status: DISCONTINUED | OUTPATIENT
Start: 2018-04-09 | End: 2018-04-09 | Stop reason: HOSPADM

## 2018-04-09 RX ORDER — FENTANYL CITRATE 50 UG/ML
INJECTION, SOLUTION INTRAMUSCULAR; INTRAVENOUS AS NEEDED
Status: DISCONTINUED | OUTPATIENT
Start: 2018-04-09 | End: 2018-04-09 | Stop reason: HOSPADM

## 2018-04-09 RX ORDER — SODIUM CHLORIDE 0.9 % (FLUSH) 0.9 %
5-10 SYRINGE (ML) INJECTION AS NEEDED
Status: DISCONTINUED | OUTPATIENT
Start: 2018-04-09 | End: 2018-04-10 | Stop reason: HOSPADM

## 2018-04-09 RX ORDER — OXYCODONE HYDROCHLORIDE 5 MG/1
5 TABLET ORAL
Status: DISCONTINUED | OUTPATIENT
Start: 2018-04-09 | End: 2018-04-10 | Stop reason: HOSPADM

## 2018-04-09 RX ORDER — ONDANSETRON 2 MG/ML
4 INJECTION INTRAMUSCULAR; INTRAVENOUS AS NEEDED
Status: DISCONTINUED | OUTPATIENT
Start: 2018-04-09 | End: 2018-04-09 | Stop reason: HOSPADM

## 2018-04-09 RX ORDER — LIDOCAINE HYDROCHLORIDE 10 MG/ML
0.1 INJECTION, SOLUTION EPIDURAL; INFILTRATION; INTRACAUDAL; PERINEURAL AS NEEDED
Status: DISCONTINUED | OUTPATIENT
Start: 2018-04-09 | End: 2018-04-09 | Stop reason: HOSPADM

## 2018-04-09 RX ORDER — DIPHENHYDRAMINE HYDROCHLORIDE 50 MG/ML
12.5 INJECTION, SOLUTION INTRAMUSCULAR; INTRAVENOUS AS NEEDED
Status: DISCONTINUED | OUTPATIENT
Start: 2018-04-09 | End: 2018-04-09 | Stop reason: HOSPADM

## 2018-04-09 RX ORDER — SODIUM CHLORIDE, SODIUM LACTATE, POTASSIUM CHLORIDE, CALCIUM CHLORIDE 600; 310; 30; 20 MG/100ML; MG/100ML; MG/100ML; MG/100ML
1000 INJECTION, SOLUTION INTRAVENOUS CONTINUOUS
Status: DISCONTINUED | OUTPATIENT
Start: 2018-04-09 | End: 2018-04-09 | Stop reason: HOSPADM

## 2018-04-09 RX ORDER — FENTANYL CITRATE 50 UG/ML
25 INJECTION, SOLUTION INTRAMUSCULAR; INTRAVENOUS
Status: COMPLETED | OUTPATIENT
Start: 2018-04-09 | End: 2018-04-09

## 2018-04-09 RX ORDER — ALBUTEROL SULFATE 0.83 MG/ML
2.5 SOLUTION RESPIRATORY (INHALATION) AS NEEDED
Status: DISCONTINUED | OUTPATIENT
Start: 2018-04-09 | End: 2018-04-09 | Stop reason: HOSPADM

## 2018-04-09 RX ORDER — LOSARTAN POTASSIUM 50 MG/1
50 TABLET ORAL DAILY
Status: DISCONTINUED | OUTPATIENT
Start: 2018-04-10 | End: 2018-04-10 | Stop reason: HOSPADM

## 2018-04-09 RX ORDER — FACIAL-BODY WIPES
10 EACH TOPICAL DAILY PRN
Status: DISCONTINUED | OUTPATIENT
Start: 2018-04-11 | End: 2018-04-10 | Stop reason: HOSPADM

## 2018-04-09 RX ORDER — MIDAZOLAM HYDROCHLORIDE 1 MG/ML
0.5 INJECTION, SOLUTION INTRAMUSCULAR; INTRAVENOUS
Status: DISCONTINUED | OUTPATIENT
Start: 2018-04-09 | End: 2018-04-09 | Stop reason: HOSPADM

## 2018-04-09 RX ORDER — FENOFIBRATE 145 MG/1
145 TABLET, COATED ORAL
Status: DISCONTINUED | OUTPATIENT
Start: 2018-04-10 | End: 2018-04-10 | Stop reason: HOSPADM

## 2018-04-09 RX ORDER — KETOROLAC TROMETHAMINE 30 MG/ML
30 INJECTION, SOLUTION INTRAMUSCULAR; INTRAVENOUS EVERY 6 HOURS
Status: DISPENSED | OUTPATIENT
Start: 2018-04-09 | End: 2018-04-10

## 2018-04-09 RX ADMIN — BUPIVACAINE HYDROCHLORIDE 13 MG: 5 INJECTION, SOLUTION EPIDURAL; INTRACAUDAL at 10:25

## 2018-04-09 RX ADMIN — KETAMINE HYDROCHLORIDE 30 MG: 50 INJECTION, SOLUTION INTRAMUSCULAR; INTRAVENOUS at 10:30

## 2018-04-09 RX ADMIN — OXYCODONE HYDROCHLORIDE 5 MG: 5 TABLET ORAL at 16:08

## 2018-04-09 RX ADMIN — ACETAMINOPHEN 650 MG: 325 TABLET, FILM COATED ORAL at 22:32

## 2018-04-09 RX ADMIN — CEFAZOLIN SODIUM 2 G: 1 INJECTION, POWDER, FOR SOLUTION INTRAMUSCULAR; INTRAVENOUS at 10:37

## 2018-04-09 RX ADMIN — FENTANYL CITRATE 50 MCG: 50 INJECTION, SOLUTION INTRAMUSCULAR; INTRAVENOUS at 09:43

## 2018-04-09 RX ADMIN — FENTANYL CITRATE 25 MCG: 50 INJECTION, SOLUTION INTRAMUSCULAR; INTRAVENOUS at 12:20

## 2018-04-09 RX ADMIN — ONDANSETRON 4 MG: 2 INJECTION INTRAMUSCULAR; INTRAVENOUS at 12:05

## 2018-04-09 RX ADMIN — FENTANYL CITRATE 25 MCG: 50 INJECTION, SOLUTION INTRAMUSCULAR; INTRAVENOUS at 12:30

## 2018-04-09 RX ADMIN — STANDARDIZED SENNA CONCENTRATE AND DOCUSATE SODIUM 1 TABLET: 8.6; 5 TABLET, FILM COATED ORAL at 17:41

## 2018-04-09 RX ADMIN — DEXAMETHASONE SODIUM PHOSPHATE: 10 INJECTION, SOLUTION INTRAMUSCULAR; INTRAVENOUS at 16:08

## 2018-04-09 RX ADMIN — PROPOFOL 100 MCG/KG/MIN: 10 INJECTION, EMULSION INTRAVENOUS at 10:30

## 2018-04-09 RX ADMIN — OXYCODONE HYDROCHLORIDE 10 MG: 5 TABLET ORAL at 19:08

## 2018-04-09 RX ADMIN — KETOROLAC TROMETHAMINE 30 MG: 30 INJECTION, SOLUTION INTRAMUSCULAR at 17:40

## 2018-04-09 RX ADMIN — FENTANYL CITRATE 25 MCG: 50 INJECTION, SOLUTION INTRAMUSCULAR; INTRAVENOUS at 12:40

## 2018-04-09 RX ADMIN — FAMOTIDINE 20 MG: 20 TABLET, FILM COATED ORAL at 17:41

## 2018-04-09 RX ADMIN — Medication 2 G: at 17:41

## 2018-04-09 RX ADMIN — ATORVASTATIN CALCIUM 10 MG: 10 TABLET, FILM COATED ORAL at 22:32

## 2018-04-09 RX ADMIN — FENTANYL CITRATE 50 MCG: 50 INJECTION, SOLUTION INTRAMUSCULAR; INTRAVENOUS at 11:46

## 2018-04-09 RX ADMIN — MIDAZOLAM 3 MG: 1 INJECTION INTRAMUSCULAR; INTRAVENOUS at 09:43

## 2018-04-09 RX ADMIN — SODIUM CHLORIDE, SODIUM LACTATE, POTASSIUM CHLORIDE, CALCIUM CHLORIDE: 600; 310; 30; 20 INJECTION, SOLUTION INTRAVENOUS at 10:20

## 2018-04-09 RX ADMIN — RIVAROXABAN 10 MG: 10 TABLET, FILM COATED ORAL at 19:08

## 2018-04-09 RX ADMIN — ACETAMINOPHEN 650 MG: 325 TABLET, FILM COATED ORAL at 16:08

## 2018-04-09 RX ADMIN — FENTANYL CITRATE 25 MCG: 50 INJECTION, SOLUTION INTRAMUSCULAR; INTRAVENOUS at 12:50

## 2018-04-09 RX ADMIN — OXYCODONE HYDROCHLORIDE 10 MG: 5 TABLET ORAL at 22:33

## 2018-04-09 NOTE — ANESTHESIA PROCEDURE NOTES
Spinal Block    Start time: 4/9/2018 10:23 AM  End time: 4/9/2018 10:26 AM  Performed by: Mary Dickens  Authorized by: Mary Dickens     Pre-procedure: Indications: primary anesthetic  Preanesthetic Checklist: patient identified, risks and benefits discussed, anesthesia consent, site marked, patient being monitored and timeout performed    Timeout Time: 10:24          Spinal Block:   Patient Position:  Seated  Prep Region:  Lumbar  Prep: Betadine      Location:  L3-4    Anesthesia block local: Marcaine 0.5% plain. Block total local dose (mL): ssee graphic record.     Needle:   Needle Type:  Pencan  Needle Gauge:  24 G  Attempts:  1      Events: CSF confirmed, no blood with aspiration and no paresthesia        Assessment:  Insertion:  Uncomplicated  Patient tolerance:  Patient tolerated the procedure well with no immediate complications

## 2018-04-09 NOTE — BRIEF OP NOTE
BRIEF OPERATIVE NOTE    Date of Procedure: 4/9/2018   Preoperative Diagnosis: DEGENERATIVE JOINT DISEASE RIGHT KNEE  Postoperative Diagnosis: DEGENERATIVE JOINT DISEASE RIGHT KNEE    Procedure(s):  RIGHT TOTAL KNEE REPLACEMENT  (CHOICE)  Surgeon(s) and Role:     * Choco Carpenter MD - Primary         Assistant Staff: Physician Assistant: KONG Phan      Surgical Staff:  Circ-1: Leonardo Hayes RN  Circ-Relief: Tino Kanner, RN  Physician Assistant: KONG Phan  Scrub Tech-1: Kamryn Yan  Scrub RN-Relief: Shane Cisse RN  Surg Asst-1: Hali Handing  Surg Asst-Relief: Jabari Come  Event Time In   Incision Start 1102   Incision Close 1154     Anesthesia: spinal  Estimated Blood Loss: 100cc  Specimens: * No specimens in log *   Findings: DJD   Complications: none  Implants:   Implant Name Type Inv.  Item Serial No.  Lot No. LRB No. Used Action   CEMENT BNE GENTAMC GHV 40GM -- SMARTSET - SN/A  CEMENT BNE GENTAMC GHV 40GM -- SMARTSET N/A [facility] [employer] [id number] Right 1 Implanted   PAT INST NXGN 32MM POLYETH --  - SN/A  PAT INST NXGN 32MM POLYETH --  N/A [employer] [id number] Right 1 Implanted   PLATE TIB STEM PC NXGN SZ 3 --  - SN/A  PLATE TIB STEM PC NXGN SZ 3 --  N/A [employer] [id number] Right 1 Implanted   COMPNT FEM POST RT SZ-F --  - SN/A  COMPNT FEM POST RT SZ-F --  N/A [employer] [id number] Right 1 Implanted   PLUG STEM TAPR NEXGEN --  - SN/A  PLUG STEM TAPR NEXGEN --  N/A [employer] [id number] Right 1 Implanted   Knee Posterior Stabilized     N/A   [id number] Right 1 Implanted

## 2018-04-09 NOTE — PROGRESS NOTES
TRANSFER - IN REPORT:    Verbal report received from [id number] Burton Coker RN (name) on Percy Young  being received from PACU (unit) for routine post - op      Report consisted of patients Situation, Background, Assessment and   Recommendations(SBAR). Information from the following report(s) SBAR, ED Summary, OR Summary, Intake/Output and MAR was reviewed with the receiving nurse. Opportunity for questions and clarification was provided. Assessment completed upon patients arrival to unit and care assumed.

## 2018-04-09 NOTE — ANESTHESIA POSTPROCEDURE EVALUATION
Post-Anesthesia Evaluation and Assessment    Patient: Krystal Mehta MRN: [id number]  SSN: [id number]    YOB: 1950  Age: 76 y.o. Sex: female       Cardiovascular Function/Vital Signs  Visit Vitals    /53    Pulse 91    Temp (!) 38.6 °C (101.4 °F)    Resp 11    Ht 5' 4\" (1.626 m)    Wt 77.6 kg (171 lb)    SpO2 98%    BMI 29.35 kg/m2       Patient is status post spinal anesthesia for Procedure(s):  RIGHT TOTAL KNEE REPLACEMENT  (CHOICE). Nausea/Vomiting: None    Postoperative hydration reviewed and adequate. Pain:  Pain Scale 1: Numeric (0 - 10) (04/09/18 1212)  Pain Intensity 1: 0 (04/09/18 1212)   Managed    Neurological Status:   Neuro (WDL): Within Defined Limits (04/09/18 0917)   At baseline    Mental Status and Level of Consciousness: Arousable    Pulmonary Status:   O2 Device: Nasal cannula (04/09/18 1212)   Adequate oxygenation and airway patent    Complications related to anesthesia: None    Post-anesthesia assessment completed.  No concerns    Signed By: Beryl Osgood, MD     April 9, 2018

## 2018-04-09 NOTE — IP AVS SNAPSHOT
[address] [facility] [address] [facility] 
[phone] Patient: Bj Gómez MRN: [id number] About your hospitalization You were admitted on:  April 9, 2018 [facility] on:  April 10, 2018 Why you were hospitalized Your primary diagnosis was: Arthritis Of Right Knee Follow-up Information Follow up With Details Comments Contact Info Julia Abdullahi, [address] Jose Miguel [facility] [address] [address] 
[phone] [address] [place] [address]   [address] 
1st Floor [facility] [address] 
[phone] Your Scheduled Appointments Tuesday May 08, 2018  9:40 AM EDT Follow Up with Jamie Pennington MD  
[address] [facility] [address] [facility] [address] [address] [phone] Discharge Orders None A check mundo indicates which time of day the medication should be taken. My Medications START taking these medications Instructions Each Dose to Equal  
 Morning Noon Evening Bedtime  
 oxyCODONE IR 5 mg immediate release tablet Commonly known as:  Chasidy Montejo Your last dose was: Your next dose is: Take 1 Tab by mouth every six (6) hours as needed. Max Daily Amount: 20 mg. Indications: Pain  
 5 mg  
    
   
   
   
  
 rivaroxaban 10 mg tablet Commonly known as:  Fiorella Villanueva Your last dose was: Your next dose is: Take 1 Tab by mouth daily (with lunch). Indications: KNEE REPLACEMENT DEEP VEIN THROMBOSIS PREVENTION  
 10 mg  
    
   
   
   
  
 senna-docusate 8.6-50 mg per tablet Commonly known as:  Pradeep Ruvalcaba Your last dose was: Your next dose is: Take 1 Tab by mouth two (2) times a day. Indications: constipation 1 Tab CHANGE how you take these medications Instructions Each Dose to Equal  
 Morning Noon Evening Bedtime  
 atorvastatin 10 mg tablet Commonly known as:  LIPITOR What changed:  Another medication with the same name was removed. Continue taking this medication, and follow the directions you see here. Your last dose was: Your next dose is: TAKE 1 TABLET NIGHTLY  
     
   
   
   
  
 fenofibrate nanocrystallized 145 mg tablet Commonly known as:  [employer] What changed:  when to take this Your last dose was: Your next dose is: Take 1 Tab by mouth daily. 145 mg CONTINUE taking these medications Instructions Each Dose to Equal  
 Morning Noon Evening Bedtime  
 aspirin delayed-release 81 mg tablet Your last dose was: Your next dose is: Take 162 mg by mouth nightly. 162 mg  
    
   
   
   
  
 cholecalciferol (VITAMIN D3) 5,000 unit Tab tablet Commonly known as:  VITAMIN D3 Your last dose was: Your next dose is: Take 5,000 Units by mouth daily. 5000 Units CYMBALTA 30 mg capsule Generic drug:  DULoxetine Your last dose was: Your next dose is: Take 30 mg by mouth daily. 30 mg  
    
   
   
   
  
 EVISTA 60 mg tablet Generic drug:  raloxifene Your last dose was: Your next dose is: Take 60 mg by mouth daily. 60 mg  
    
   
   
   
  
 levothyroxine 137 mcg tablet Commonly known as:  SYNTHROID Your last dose was: Your next dose is: Take 137 mcg by mouth Daily (before breakfast). 137 mcg  
    
   
   
   
  
 losartan 50 mg tablet Commonly known as:  COZAAR Your last dose was: Your next dose is: Take 1 Tab by mouth daily. 50 mg  
    
   
   
   
  
 metFORMIN 1,000 mg tablet Commonly known as:  GLUCOPHAGE  
   
 Your last dose was: Your next dose is: Take 1 Tab by mouth two (2) times daily (with meals). 1000 mg  
    
   
   
   
  
 metoprolol succinate 25 mg XL tablet Commonly known as:  TOPROL-XL Your last dose was: Your next dose is: Take 1 Tab by mouth daily. 25 mg  
    
   
   
   
  
 VITAMIN B-12 INJECTION Your last dose was: Your next dose is:    
   
   
 by Injection route every fourteen (14) days. Where to Get Your Medications Information on where to get these meds will be given to you by the nurse or doctor. ! Ask your nurse or doctor about these medications  
  oxyCODONE IR 5 mg immediate release tablet  
 rivaroxaban 10 mg tablet  
 senna-docusate 8.6-50 mg per tablet Opioid Education Prescription Opioids: What You Need to Know: 
 
 
Follow up appointments ? Follow up with Dr. Nichelle Ludwgi in 2 weeks. Call [phone] to make an appointment. ? If home health has been arranged for you the agency will contact you to arrange dates/times for visits. Please call them if you do not hear from them within 24 hours after you are discharged When to call your Orthopaedic Surgeon: Call [phone]. If you call after 5pm or on a weekend, the on call physician will be contacted ? Unrelieved pain ? Signs of infection-if your incision is red; continues to have drainage; drainage has a foul odor or if you have a persistent fever over 101 degrees ? Signs of a blood clot in your leg-calf pain, tenderness, redness, swelling of lower leg When to call your Primary Care Physician: 
? Concerns about medical conditions such as diabetes, high blood pressure, asthma, congestive heart failure ? Call if blood sugars are elevated, persistent headache or dizziness, coughing or congestion, constipation or diarrhea, burning with urination, abnormal heart rate When to call 911and go to the nearest emergency room ? Acute onset of chest pain, shortness of breath, difficulty breathing Activity ? Weight bearing as tolerated with walker or crutches. Refer to pages 23-31 of your handbook for instructions and pictures ? Complete your Home Exercise Program daily as instructed by your therapist.  Refer to pages 33-41 of your handbook for instructions and pictures ? Get up every one hour and walk (except at night when sleeping) ? Do not drive or operate heavy machinery Incision Care ? You will have staples in your knee incision; they will be removed at the surgeons office visit in 2 weeks ? Keep a dressing (ABD and paper tape) on your incision and change daily. Wash your hands thoroughly before changing the dressing. Once you incision is not draining, you may leave it open to air ? You may shower and get your incision wet but do not submerge your incision under water in a bath tub, hot tub or swimming pool for 6 weeks after surgery. Preventing blood clots ? Take Xarelto at 12 noon daily as prescribed by Dr. Taco Alejandre for 2 weeks Pain management ? Take pain medication as prescribed; decrease the amount you use as your pain lessens ? Avoid alcoholic beverages while taking pain medication ? Please be aware that many medications contain Tylenol.   We do not want you to over medicate so please read the information below as a guide. Do not take more than 3 Grams of Tylenol in a 24 hour period. (There are 1000 milligrams in one Gram) 
o 325 mg of Tylenol per tablet (do not take more than 9 tablets in 24 hours) 
o 500 mg of Tylenol per tablet (do not take more than 6 tablets in 24 hours) ? You may place an ice bag on your knee for 15-20 minutes after exercising Diet ? Resume usual diet; drink plenty of fluids; eat foods high in fiber ? You may want to take a stool softener (such as Senokot-S or Colace) to prevent constipation while you are taking pain medication. If constipation occurs, take a laxative (such as Dulcolax tablets, Milk of Magnesia, or a suppository) Home Health Care Protocol (to be followed by [address]) Nursing-per physicians order ? Complete head to toe assessment, vital signs ? Doron Fuel will be removed in the surgeons office at 2 week visit ? Medication reconciliation ? Review pain management ? Manage chronic medical conditions Physical Therapy-per physicians order Weight bearing status: 
Precautions at Admission: Fall, WBAT Left Side Weight Bearing: Full Right Side Weight Bearing: As tolerated ? Mobility Status: 
Supine to Sit: Supervision Sit to Stand: Stand-by assistance Sit to Supine:  (NT- returned to chair) Gait: 
Distance (ft): 200 Feet (ft) Ambulation - Level of Assistance: Stand-by assistance, Contact guard assistance, Assist x1 Assistive Device: Gait belt, Walker, rolling Gait Abnormalities: Antalgic, Decreased step clearance, Step to gait (step to >step through gait) ADL status overall composite: 
  
  
  
  
 
Physical Therapy ? Assessment and evaluation-bed mobility; functional transfers (bed, chair, bathroom, stairs); ambulation with equipment, car transfers, shower transfers, safety and ability to get out of house in the event of an emergency ? Review weight bearing as tolerated, wean from walker or crutches as tolerated ? Discuss pain management ? Review how to do ADLs. Refer to page 42 of patient handbook Home Exercise Program-refer to pages 33-41 of patient handbook for exercises ACO Transitions of Care Introducing [employer] [address] offers a voluntary care coordination program to provide high quality service and care to [facility] fee-for-service beneficiaries. Aledrake Shoemaker was designed to help you enhance your health and well-being through the following services: ? Transitions of Care  support for individuals who are transitioning from one care setting to another (example: Hospital to home). ? Chronic and Complex Care Coordination  support for individuals and caregivers of those with serious or chronic illnesses or with more than one chronic (ongoing) condition and those who take a number of different medications. If you meet specific medical criteria, a [address] [place] [address] Rd may call you directly to coordinate your care with your primary care physician and your other care providers. For questions about the [facility] programs, please, contact your physicians office. For general questions or additional information about Accountable Care Organizations: 
Please visit www.medicare.gov/acos. html or call 1-800-MEDICARE (6-533.707.2598) TTY users should call 2-255.643.7243. Introducing [facility]! Dear Amanda Lee: Thank you for requesting a [employer] account. Our records indicate that you already have an active MonoLibre account. You can access your account anytime at [email or web address] Did you know that you can access your hospital and ER discharge instructions at any time in MonoLibre? You can also review all of your test results from your hospital stay or ER visit. Additional Information If you have questions, please visit the Frequently Asked Questions section of the MyChart website at [email or web address] Remember, [employer] is NOT to be used for urgent needs. For medical emergencies, dial 911. Now available from your iPhone and Android! Introducing Geraldo Pickard As a [facility] patient, I wanted to make you aware of our electronic visit tool called Geraldo CarterUB Access. [email or web address] 24/7 allows you to connect within minutes with a medical provider 24 hours a day, seven days a week via a mobile device or tablet or logging into a secure website from your computer. You can access Geraldo Pickard from anywhere in the [place]. A virtual visit might be right for you when you have a simple condition and feel like you just dont want to get out of bed, or cant get away from work for an appointment, when your regular [facility] provider is not available (evenings, weekends or holidays), or when youre out of town and need minor care. Electronic visits cost only $49 and if the [facility] CoolSystems/7 provider determines a prescription is needed to treat your condition, one can be electronically transmitted to a nearby pharmacy*. Please take a moment to enroll today if you have not already done so. The enrollment process is free and takes just a few minutes. To enroll, please download the Concept Inbox/Affectv roger to your tablet or phone, or visit [email or web address] to enroll on your computer. And, as an [address] [place] [address] patient with a [employer] account, the results of your visits will be scanned into your electronic medical record and your primary care provider will be able to view the scanned results. We urge you to continue to see your regular [facility] provider for your ongoing medical care.   And while your primary care provider may not be the one available when you seek a Geraldo Pickard virtual visit, the peace of mind you get from getting a real diagnosis real time can be priceless. For more information on Geraldo Pickard, view our Frequently Asked Questions (FAQs) at [email or web address]. Sincerely, 
 
Heber Gunn MD 
Chief Medical Officer [employer] *:  certain medications cannot be prescribed via Geraldo Pickard Providers Seen During Your Hospitalization Provider Specialty Primary office phone Bonita Cruz MD Orthopedic Surgery [phone] Your Primary Care Physician (PCP) Primary Care Physician Office Phone Office Fax Yuko Mahajan [phone] You are allergic to the following Allergen Reactions Lactose Diarrhea Nausea Only Sulfa (Sulfonamide Antibiotics) Hives Recent Documentation Height Weight BMI OB Status Smoking Status 1.626 m 77.6 kg 29.35 kg/m2 Hysterectomy Never Smoker Emergency Contacts Name Discharge Info Relation Home Work Mobile [facility] [address], [address]  CAREGIVER [3] Spouse [3] [phone] Patient Belongings The following personal items are in your possession at time of discharge: 
     Visual Aid: Glasses      Home Medications: None   Jewelry: None  Clothing:  (clothing to PACU)    Other Valuables: Eyeglasses Please provide this summary of care documentation to your next provider. Signatures-by signing, you are acknowledging that this After Visit Summary has been reviewed with you and you have received a copy. Patient Signature:  ____________________________________________________________ Date:  ____________________________________________________________  
  
Beti Rosa Provider Signature:  ____________________________________________________________ Date:  ____________________________________________________________

## 2018-04-09 NOTE — BRIEF OP NOTE
BRIEF OPERATIVE NOTE    Date of Procedure: 4/9/2018   Preoperative Diagnosis: DEGENERATIVE JOINT DISEASE RIGHT KNEE  Postoperative Diagnosis: DEGENERATIVE JOINT DISEASE RIGHT KNEE    Procedure(s):  RIGHT TOTAL KNEE REPLACEMENT  (CHOICE)  Surgeon(s) and Role:     * Fredric Dandy, MD - Primary         Assistant Staff: Physician Assistant: KONG Gaytan      Surgical Staff:  Circ-1: Christie Smith RN  Circ-Relief: Jeanette Figueroa RN  Physician Assistant: KONG Gaytan  Scrub Tech-1: Annamaria Fuller  Surg Asst-1: Arianna James  Surg Asst-Relief: Lizzethbenito Barragan  Event Time In   Incision Start 1102   Incision Close      Anesthesia: Other   Estimated Blood Loss: 100mL  Specimens: * No specimens in log *   Findings: Right knee DJD   Complications: none  Implants:   Implant Name Type Inv.  Item Serial No.  Lot No. LRB No. Used Action   CEMENT BNE GENTAMC GHV 40GM -- SMARTSET - SN/A  CEMENT BNE GENTAMC GHV 40GM -- SMARTSET N/A [facility] [employer] [id number] Right 1 Implanted   PAT INST NXGN 32MM POLYETH --  - SN/A  PAT INST NXGN 32MM POLYETH --  N/A [employer] [id number] Right 1 Implanted   PLATE TIB STEM PC NXGN SZ 3 --  - SN/A  PLATE TIB STEM PC NXGN SZ 3 --  N/A [employer] [id number] Right 1 Implanted   COMPNT FEM POST RT SZ-F --  - SN/A  COMPNT FEM POST RT SZ-F --  N/A [employer] [id number] Right 1 Implanted   PLUG STEM TAPR NEXGEN --  - SN/A  PLUG STEM TAPR NEXGEN --  N/A [employer] [id number] Right 1 Implanted   Knee Posterior Stabilized     N/A   [id number] Right 1 Implanted

## 2018-04-09 NOTE — PROGRESS NOTES
Problem: Mobility Impaired (Adult and Pediatric)  Goal: *Acute Goals and Plan of Care (Insert Text)  Physical Therapy Goals  Initiated 4/9/2018    1. Patient will move from supine to sit and sit to supine , scoot up and down and roll side to side in bed with independence within 4 days. 2. Patient will perform sit to stand with modified independence within 4 days. 3. Patient will ambulate with modified independence for 300 feet with the least restrictive device within 4 days. 4. Patient will ascend/descend 5 stairs with one handrail(s) with modified independence within 4 days. 5. Patient will perform home exercise program per protocol with independence within 4 days. 6. Patient will demonstrate AROM 0-90 degrees in operative joint within 4 days. physical Therapy EVALUATION  Patient: Kenneth Lemus (77 y.o. female)  Date: 4/9/2018  Primary Diagnosis: DEGENERATIVE JOINT DISEASE RIGHT KNEE  Arthritis of right knee  Procedure(s) (LRB):  RIGHT TOTAL KNEE REPLACEMENT  (CHOICE) (Right) Day of Surgery   Precautions:   Fall, WBAT    ASSESSMENT :  Based on the objective data described below, the patient presents with mobility at an overall supervision to contact guard level s/p TKR POD 0. She is moving very well, amb 40 feet with walker support and anticipate steady gains with right knee ROM and mobility allowing for discharge to home and [facility] for follow up. Pt and her  report that they did attend the per op class and he is her . Patient will benefit from skilled intervention to address the above impairments.   Patients rehabilitation potential is considered to be Excellent  Factors which may influence rehabilitation potential include:   []         None noted  []         Mental ability/status  []         Medical condition  []         Home/family situation and support systems  []         Safety awareness  []         Pain tolerance/management  []         Other:      PLAN :  Recommendations and Planned Interventions:  [x]           Bed Mobility Training             []    Neuromuscular Re-Education  [x]           Transfer Training                   []    Orthotic/Prosthetic Training  [x]           Gait Training                         []    Modalities  [x]           Therapeutic Exercises           []    Edema Management/Control  [x]           Therapeutic Activities            []    Patient and Family Training/Education  []           Other (comment):    Frequency/Duration: Patient will be followed by physical therapy  twice daily to address goals. Discharge Recommendations: Home Health  Further Equipment Recommendations for Discharge: none     SUBJECTIVE:   Patient stated That wasn't bad.     OBJECTIVE DATA SUMMARY:   Consult received, chart reviewed, pt cleared by nursing  HISTORY:    Past Medical History:   Diagnosis Date    Autonomic dysfunction 12/4/98    tilt test with marked heart rate variablility and drop in BP without hemodynamic collapse    Cancer ([facility] [address] 1997    left breast lumpectomy , Rx RT    Diabetes ([facility] [address]     pre-diabetic, on metformin    Dilated cardiomyopathy ([facility] [address] 2/8/06    nonischemic    Elevated triglycerides with high cholesterol 11/21/2014    Essential hypertension 2/9/2017    Hx MRSA infection 3/26/2018    Patricia Mullen Hypothyroidism 1994    Dr. Leonie Huerta.   Jesu Prior Ill-defined condition     high cholesterol    Insulin resistance 11/21/2014    Mixed hyperlipidemia with apolipoprotein E2 variant 11/21/2014    Nausea & vomiting     Osteoarthritis     diffuse.  Dr. Euna Severs Other and unspecified hyperlipidemia 12/21/2010    PONV (postoperative nausea and vomiting)     Psychiatric disorder     anxiety    PVC's 1994    Sleep apnea     Not wearing CPAP    Vitamin D deficiency 11/21/2014     Past Surgical History:   Procedure Laterality Date    HX APPENDECTOMY  1969    HX BREAST AUGMENTATION Bilateral 12/18/2015    REVISION BILATERAL RECONSTRUCTED BREAST, FAT GRAFTING TO CHEST WALL, NIPPLE RECONSTRUCTION performed by Baljit Xavier MD at [facility] HX BREAST AUGMENTATION Bilateral 10/14/2016    REVISION BILATERAL BREAST RECONSTRUCTION / FAT GRAFTING TO CHEST WALL; REVISION OF ABDOMINAL SCAR performed by Baljit Xavier MD at [address] HX BREAST LUMPECTOMY Left 1997    lumpectomy    HX BREAST RECONSTRUCTION Bilateral 9/28/2015    REVISION OF BILATERAL RECONSTRUCTED BREASTS, FAT GRAFTING TO CHEST WALL,  REVISION OF ABDOMINAL SCAR performed by Baljit Xavier MD at [facility] HX CATARACT REMOVAL Bilateral 2008    with lens implants    HX CHOLECYSTECTOMY  2015    HX COLONOSCOPY  2014    HX ENDOSCOPY  2014    [address]    LVEF 60%. Normal. Dr. Dirk Ferraro.     HX HERNIA REPAIR  4/22 16    HX HYSTERECTOMY  1986    left 1 ovary    HX MASTECTOMY  5/2015    BL mastectomy    HX ORTHOPAEDIC Left 1988    foot surgery    HX SALPINGO-OOPHORECTOMY  1988    remaining ovary removed    HX WISDOM TEETH EXTRACTION       Prior Level of Function/Home Situation: independent  Personal factors and/or comorbidities impacting plan of care:     Home Situation  Home Environment: Private residence  # Steps to Enter: 5  Rails to Enter: Yes  Hand Rails : Right  One/Two Story Residence: Two story, live on 1st floor  Living Alone: No  Support Systems: Spouse/Significant Other/Partner  Patient Expects to be Discharged to[de-identified] Private residence  Current DME Used/Available at Home: denise Vir[employer] Pamela, zach    EXAMINATION/PRESENTATION/DECISION MAKING:   Critical Behavior:      alert and oriented         Hearing:     Skin:  Refer to MD and nursing notes, surgical bandage/ace wrap in place  Edema: none noted  Range Of Motion:  AROM: Generally decreased, functional                       Strength:    Strength: Generally decreased, functional                    Tone & Sensation:                  Sensation: Impaired (anesthesia nearly resolved) some numbness still right foot Coordination:     Vision:      Functional Mobility:  Bed Mobility:     Supine to Sit: Supervision  Sit to Supine: Supervision     Transfers:  Sit to Stand: Contact guard assistance  Stand to Sit: Contact guard assistance                       Balance:   Sitting: Intact; Without support  Standing: Intact; With support  Ambulation/Gait Training:  Distance (ft): 40 Feet (ft)  Assistive Device: Gait belt;Walker, rolling  Ambulation - Level of Assistance: Contact guard assistance        Gait Abnormalities: Decreased step clearance; Antalgic  Right Side Weight Bearing: As tolerated     Base of Support: Widened     Speed/Lissy: Slow  Step Length: Left shortened;Right shortened                     Stairs: Therapeutic Exercises: Ankle pumps and quad sets    Functional Measure:  Timed up and go:    Timed Get Up And Go Test: 18 (extrapolated)     Timed Up and Go and G-code impairment scale:  Percentage of Impairment CH    0%   CI    1-19% CJ    20-39% CK    40-59% CL    60-79% CM    80-99% CN     100%   Timed   Score 0-56 10 11-12 13-14 15-16 17-18 19 20       < than 10 seconds=Normal  Greater then 13.5 seconds (in elderly)=Increased fall risk   [place] IVORY, Geoff Leblanc. Predicting the probability for falls in community dwelling older adults using the Timed Up and Go Test. Phys Ther. 2000;80:896-903. G codes: In compliance with CMSs Claims Based Outcome Reporting, the following G-code set was chosen for this patient based on their primary functional limitation being treated: The outcome measure chosen to determine the severity of the functional limitation was the TUG with a score of 18 which was correlated with the impairment scale.     ? Mobility - Walking and Moving Around:     - CURRENT STATUS: CL - 60%-79% impaired, limited or restricted    - GOAL STATUS: CI - 1%-19% impaired, limited or restricted    - D/C STATUS:  ---------------To be determined---------------      Physical Therapy Evaluation Charge Determination   History Examination Presentation Decision-Making   LOW Complexity : Zero comorbidities / personal factors that will impact the outcome / POC LOW Complexity : 1-2 Standardized tests and measures addressing body structure, function, activity limitation and / or participation in recreation  LOW Complexity : Stable, uncomplicated  LOW Complexity : FOTO score of       Based on the above components, the patient evaluation is determined to be of the following complexity level: LOW     Pain:  Pain Scale 1: Numeric (0 - 10)  Pain Intensity 1: 2, and also a headache not rated, discussed with her nurse  Pain Location 1: Knee  Pain Orientation 1: Right        Activity Tolerance:   VSS  Please refer to the flowsheet for vital signs taken during this treatment. After treatment:   []         Patient left in no apparent distress sitting up in chair  [x]         Patient left in no apparent distress in bed  [x]         Call bell left within reach  [x]         Nursing notified  [x]         Caregiver present  []         Bed alarm activated    COMMUNICATION/EDUCATION:   The patients plan of care was discussed with: Registered Nurse. [x]         Fall prevention education was provided and the patient/caregiver indicated understanding. [x]         Patient/family have participated as able in goal setting and plan of care. [x]         Patient/family agree to work toward stated goals and plan of care. []         Patient understands intent and goals of therapy, but is neutral about his/her participation. []         Patient is unable to participate in goal setting and plan of care.     Thank you for this referral.  Magdalene Putnam   Time Calculation: 19 mins

## 2018-04-09 NOTE — PROGRESS NOTES
[address] [facility] and Víctor Quiles, EUGENIE performed a dual skin assessment on this patient No impairment noted  Tiburcio score is 20

## 2018-04-09 NOTE — OP NOTES
4/9/2018  OPERATIVE REPORT      PREOPERATIVE DIAGNOSIS: Osteoarthritis, right knee. POSTOPERATIVE DIAGNOSIS: Osteoarthritis, right knee. OPERATIVE PROCEDURE: right total knee replacement. SURGEON: Audi Melo MD    ASSISTANT SURGEON: Justin Bañuelos, [facility]    ANESTHESIA: Spinal.    INDICATIONS: : A 76 y.o.  female  with end stage osteoarthritis to the right knee, not responsive to conservative management. COMPLICATIONS:None    PROCEDURE: The patient was taken to the operating room, underwent  placement of spinal anesthesia. The knee and leg were prepped and  draped in the usual fashion. The leg was exsanguinated with the Esmarch  bandage and tourniquet inflated to 350 mmHg. A longitudinal midline  incision made down through skin and subcutaneous tissue. A medial  parapatellar arthrotomy was performed, and extended proximally along the  medial border of the quadriceps tendon, distally along the medial border of  the insertion of the patella tendon. Medial release was performed. Retropatellar fat pad was sharply excised. The patella was subluxated  laterally, the knee was flexed to 90 degrees. Drill was used to enter the  intramedullary canal of the distal femur. The 5 degree intramedullary guide  was applied and the distal femoral cut was performed. The femur was sized  to a F. A F cutting block was applied, and the anterior, posterior,  chamfer cuts were performed. The posterior stabilized cut was performed. The extramedullary tibial guide was applied, and the tibia was cut in  neutral alignment. The tibia was sized to a 3. Knee was balanced to varus  and valgus stress. Flexion and extension gaps were equal. Trial reduction  was performed, using 10 mm insert. Excellent range of motion and stability  were noted. The patella was everted, and the patella was cut using freehand  technique. Patella was sized to a 32. Drill holes were placed, and patella  button applied.  The patella tracked normally. All trial components were  removed, and surfaces were prepared using drill and punch. All residual  meniscal tissue was sharply excised. Hemostasis was obtained using Bovie  apparatus. All components were cemented in place, taking care to remove all  excess cement. The knee was maintained in full extension while the cement  hardened. The tourniquet was let down after a total tourniquet time of 33  minutes. Hemostasis was obtained using the Bovie apparatus. The joint was  extensively irrigated with antibiotic solution. The arthrotomy was repaired  using #2 Vicryl in interrupted figure-of-eight fashion. Subcutaneous tissue  was approximated using 2-0 Vicryl in interrupted fashion. Skin edges were  approximated using stapling apparatus. Joint was infiltrated with 30 mL of  0.5% Marcaine with epinephrine. Bulky sterile dressing was applied, and the  patient was transferred to recovery room in stable condition. There were no  complications. ESTIMATED BLOOD LOSS: 100 cc.     SPECIMEN: Sukhwinder Villavicencio M.D.  4/9/2018  12:16 PM

## 2018-04-09 NOTE — ANESTHESIA PREPROCEDURE EVALUATION
Anesthetic History   No history of anesthetic complications            Review of Systems / Medical History  Patient summary reviewed, nursing notes reviewed and pertinent labs reviewed    Pulmonary        Sleep apnea: No treatment           Neuro/Psych   Within defined limits           Cardiovascular    Hypertension: well controlled              Exercise tolerance: >4 METS     GI/Hepatic/Renal  Within defined limits              Endo/Other    Diabetes: type 2  Hypothyroidism  Arthritis     Other Findings            Physical Exam    Airway  Mallampati: II  TM Distance: > 6 cm  Neck ROM: normal range of motion   Mouth opening: Normal     Cardiovascular  Regular rate and rhythm,  S1 and S2 normal,  no murmur, click, rub, or gallop             Dental  No notable dental hx       Pulmonary  Breath sounds clear to auscultation               Abdominal  GI exam deferred       Other Findings            Anesthetic Plan    ASA: 2  Anesthesia type: spinal      Post-op pain plan if not by surgeon: peripheral nerve block single    Induction: Intravenous  Anesthetic plan and risks discussed with: Patient

## 2018-04-09 NOTE — ANESTHESIA PROCEDURE NOTES
Peripheral Block    Start time: 4/9/2018 10:15 AM  End time: 4/9/2018 10:21 AM  Performed by: Noelle Tesfaye  Authorized by: Noelle Tesfaye       Pre-procedure: Indications: at surgeon's request, post-op pain management and procedure for pain    Preanesthetic Checklist: patient identified, risks and benefits discussed, site marked, timeout performed, anesthesia consent given and patient being monitored      Block Type:   Block Type:   Adductor canal  Laterality:  Right  Monitoring:  Responsive to questions, standard ASA monitoring, continuous pulse ox, oxygen, frequent vital sign checks and heart rate  Injection Technique:  Single shot  Procedures: ultrasound guided    Patient Position: supine  Prep: chlorhexidine    Location:  Mid thigh  Needle Type:  Stimuplex  Needle Gauge:  22 G  Needle Localization:  Ultrasound guidance  Medication Injected:  0.5%  ropivacaine  Volume (mL):  30    Assessment:  Number of attempts:  1  Injection Assessment:  Incremental injection every 5 mL, negative aspiration for CSF, no paresthesia, no intravascular symptoms, negative aspiration for blood and local visualized surrounding nerve on ultrasound  Patient tolerance:  Patient tolerated the procedure well with no immediate complications

## 2018-04-09 NOTE — PERIOP NOTES
TRANSFER - OUT REPORT:    Verbal report given to jessie TRAORE(name) on Dedtia Bridges  being transferred to 571(unit) for routine post - op       Report consisted of patients Situation, Background, Assessment and   Recommendations(SBAR). Time Pre op antibiotic given:1037  Anesthesia Stop time: 0753  Damian Present on Transfer to floor:y  Order for Damian on Chart:y  Discharge Prescriptions with Chart:na    Information from the following report(s) SBAR, Kardex, Intake/Output and MAR was reviewed with the receiving nurse. Opportunity for questions and clarification was provided. Is the patient on 02? NO       L/Min na       Other na    Is the patient on a monitor? NO    Is the nurse transporting with the patient? NO    Surgical Waiting Area notified of patient's transfer from PACU? YES      The following personal items collected during your admission accompanied patient upon transfer:   Dental Appliance:    Vision: Visual Aid: Glasses  Hearing Aid:    Jewelry: Jewelry: None  Clothing: Clothing:  (clothing to PACU)  Other Valuables:  Other Valuables: Eyeglasses  Valuables sent to safe:

## 2018-04-09 NOTE — IP AVS SNAPSHOT
[address] [facility] [address] [place] [address] 
[phone] Patient: Wai Tracy MRN: [id number] [facility]:[birth year] A check mundo indicates which time of day the medication should be taken. My Medications START taking these medications Instructions Each Dose to Equal  
 Morning Noon Evening Bedtime  
 oxyCODONE IR 5 mg immediate release tablet Commonly known as:  [place] Your last dose was: Your next dose is: Take 1 Tab by mouth every six (6) hours as needed. Max Daily Amount: 20 mg. Indications: Pain  
 5 mg  
    
   
   
   
  
 rivaroxaban 10 mg tablet Commonly known as:  Trista Gunn Your last dose was: Your next dose is: Take 1 Tab by mouth daily (with lunch). Indications: KNEE REPLACEMENT DEEP VEIN THROMBOSIS PREVENTION  
 10 mg  
    
   
   
   
  
 senna-docusate 8.6-50 mg per tablet Commonly known as:  Herb Fought Your last dose was: Your next dose is: Take 1 Tab by mouth two (2) times a day. Indications: constipation 1 Tab CHANGE how you take these medications Instructions Each Dose to Equal  
 Morning Noon Evening Bedtime  
 atorvastatin 10 mg tablet Commonly known as:  LIPITOR What changed:  Another medication with the same name was removed. Continue taking this medication, and follow the directions you see here. Your last dose was: Your next dose is: TAKE 1 TABLET NIGHTLY  
     
   
   
   
  
 fenofibrate nanocrystallized 145 mg tablet Commonly known as:  [employer] What changed:  when to take this Your last dose was: Your next dose is: Take 1 Tab by mouth daily. 145 mg CONTINUE taking these medications Instructions Each Dose to Equal  
 Morning Noon Evening Bedtime  
 aspirin delayed-release 81 mg tablet Your last dose was: Your next dose is: Take 162 mg by mouth nightly. 162 mg  
    
   
   
   
  
 cholecalciferol (VITAMIN D3) 5,000 unit Tab tablet Commonly known as:  VITAMIN D3 Your last dose was: Your next dose is: Take 5,000 Units by mouth daily. 5000 Units CYMBALTA 30 mg capsule Generic drug:  DULoxetine Your last dose was: Your next dose is: Take 30 mg by mouth daily. 30 mg  
    
   
   
   
  
 EVISTA 60 mg tablet Generic drug:  raloxifene Your last dose was: Your next dose is: Take 60 mg by mouth daily. 60 mg  
    
   
   
   
  
 levothyroxine 137 mcg tablet Commonly known as:  SYNTHROID Your last dose was: Your next dose is: Take 137 mcg by mouth Daily (before breakfast). 137 mcg  
    
   
   
   
  
 losartan 50 mg tablet Commonly known as:  COZAAR Your last dose was: Your next dose is: Take 1 Tab by mouth daily. 50 mg  
    
   
   
   
  
 metFORMIN 1,000 mg tablet Commonly known as:  GLUCOPHAGE Your last dose was: Your next dose is: Take 1 Tab by mouth two (2) times daily (with meals). 1000 mg  
    
   
   
   
  
 metoprolol succinate 25 mg XL tablet Commonly known as:  TOPROL-XL Your last dose was: Your next dose is: Take 1 Tab by mouth daily. 25 mg  
    
   
   
   
  
 VITAMIN B-12 INJECTION Your last dose was: Your next dose is:    
   
   
 by Injection route every fourteen (14) days. Where to Get Your Medications Information on where to get these meds will be given to you by the nurse or doctor. ! Ask your nurse or doctor about these medications  
  oxyCODONE IR 5 mg immediate release tablet  
 rivaroxaban 10 mg tablet  
 senna-docusate 8.6-50 mg per tablet

## 2018-04-10 ENCOUNTER — HOME HEALTH ADMISSION (OUTPATIENT)
Dept: HOME HEALTH SERVICES | Facility: HOME HEALTH | Age: 68
End: 2018-04-10
Payer: MEDICARE

## 2018-04-10 VITALS
RESPIRATION RATE: 15 BRPM | SYSTOLIC BLOOD PRESSURE: 108 MMHG | WEIGHT: 171 LBS | DIASTOLIC BLOOD PRESSURE: 68 MMHG | OXYGEN SATURATION: 97 % | HEART RATE: 75 BPM | TEMPERATURE: 98 F | BODY MASS INDEX: 29.19 KG/M2 | HEIGHT: 64 IN

## 2018-04-10 LAB
ANION GAP SERPL CALC-SCNC: 7 MMOL/L (ref 5–15)
BUN SERPL-MCNC: 11 MG/DL (ref 6–20)
BUN/CREAT SERPL: 23 (ref 12–20)
CALCIUM SERPL-MCNC: 8.2 MG/DL (ref 8.5–10.1)
CHLORIDE SERPL-SCNC: 111 MMOL/L (ref 97–108)
CO2 SERPL-SCNC: 22 MMOL/L (ref 21–32)
CREAT SERPL-MCNC: 0.47 MG/DL (ref 0.55–1.02)
GLUCOSE SERPL-MCNC: 138 MG/DL (ref 65–100)
HGB BLD-MCNC: 8.6 G/DL (ref 11.5–16)
POTASSIUM SERPL-SCNC: 4 MMOL/L (ref 3.5–5.1)
SODIUM SERPL-SCNC: 140 MMOL/L (ref 136–145)

## 2018-04-10 PROCEDURE — 85018 HEMOGLOBIN: CPT | Performed by: PHYSICIAN ASSISTANT

## 2018-04-10 PROCEDURE — 80048 BASIC METABOLIC PNL TOTAL CA: CPT | Performed by: PHYSICIAN ASSISTANT

## 2018-04-10 PROCEDURE — 74011250637 HC RX REV CODE- 250/637: Performed by: PHYSICIAN ASSISTANT

## 2018-04-10 PROCEDURE — 97530 THERAPEUTIC ACTIVITIES: CPT

## 2018-04-10 PROCEDURE — 97116 GAIT TRAINING THERAPY: CPT

## 2018-04-10 PROCEDURE — 36415 COLL VENOUS BLD VENIPUNCTURE: CPT | Performed by: PHYSICIAN ASSISTANT

## 2018-04-10 PROCEDURE — 74011250636 HC RX REV CODE- 250/636: Performed by: PHYSICIAN ASSISTANT

## 2018-04-10 PROCEDURE — 99218 HC RM OBSERVATION: CPT

## 2018-04-10 RX ORDER — TRAMADOL HYDROCHLORIDE 50 MG/1
50 TABLET ORAL
Qty: 30 TAB | Refills: 0 | Status: SHIPPED | OUTPATIENT
Start: 2018-04-10 | End: 2018-04-10

## 2018-04-10 RX ORDER — AMOXICILLIN 250 MG
1 CAPSULE ORAL 2 TIMES DAILY
Qty: 60 TAB | Refills: 0 | Status: SHIPPED | OUTPATIENT
Start: 2018-04-10 | End: 2018-10-24 | Stop reason: ALTCHOICE

## 2018-04-10 RX ORDER — OXYCODONE HYDROCHLORIDE 5 MG/1
5 TABLET ORAL
Qty: 50 TAB | Refills: 0 | Status: SHIPPED | OUTPATIENT
Start: 2018-04-10 | End: 2018-10-24 | Stop reason: ALTCHOICE

## 2018-04-10 RX ADMIN — POLYETHYLENE GLYCOL 3350 17 G: 17 POWDER, FOR SOLUTION ORAL at 09:32

## 2018-04-10 RX ADMIN — OXYCODONE HYDROCHLORIDE 10 MG: 5 TABLET ORAL at 12:41

## 2018-04-10 RX ADMIN — OXYCODONE HYDROCHLORIDE 10 MG: 5 TABLET ORAL at 02:23

## 2018-04-10 RX ADMIN — FAMOTIDINE 20 MG: 20 TABLET, FILM COATED ORAL at 09:32

## 2018-04-10 RX ADMIN — METOPROLOL SUCCINATE 25 MG: 25 TABLET, EXTENDED RELEASE ORAL at 09:31

## 2018-04-10 RX ADMIN — OXYCODONE HYDROCHLORIDE 5 MG: 5 TABLET ORAL at 06:37

## 2018-04-10 RX ADMIN — SODIUM CHLORIDE 125 ML/HR: 900 INJECTION, SOLUTION INTRAVENOUS at 06:37

## 2018-04-10 RX ADMIN — METFORMIN HYDROCHLORIDE 1000 MG: 500 TABLET, FILM COATED ORAL at 09:31

## 2018-04-10 RX ADMIN — KETOROLAC TROMETHAMINE 30 MG: 30 INJECTION, SOLUTION INTRAMUSCULAR at 06:37

## 2018-04-10 RX ADMIN — STANDARDIZED SENNA CONCENTRATE AND DOCUSATE SODIUM 1 TABLET: 8.6; 5 TABLET, FILM COATED ORAL at 09:31

## 2018-04-10 RX ADMIN — Medication 2 G: at 02:17

## 2018-04-10 RX ADMIN — ACETAMINOPHEN 650 MG: 325 TABLET, FILM COATED ORAL at 09:32

## 2018-04-10 RX ADMIN — OXYCODONE HYDROCHLORIDE 10 MG: 5 TABLET ORAL at 09:32

## 2018-04-10 RX ADMIN — RALOXIFENE 60 MG: 60 TABLET ORAL at 09:31

## 2018-04-10 RX ADMIN — LEVOTHYROXINE SODIUM 137 MCG: 112 TABLET ORAL at 06:48

## 2018-04-10 RX ADMIN — ACETAMINOPHEN 650 MG: 325 TABLET, FILM COATED ORAL at 04:25

## 2018-04-10 RX ADMIN — DULOXETINE HYDROCHLORIDE 30 MG: 30 CAPSULE, DELAYED RELEASE ORAL at 09:32

## 2018-04-10 RX ADMIN — KETOROLAC TROMETHAMINE 30 MG: 30 INJECTION, SOLUTION INTRAMUSCULAR at 00:30

## 2018-04-10 RX ADMIN — RIVAROXABAN 10 MG: 10 TABLET, FILM COATED ORAL at 12:41

## 2018-04-10 NOTE — DISCHARGE INSTRUCTIONS
After Hospital Care Plan:    Discharge Instructions Knee Replacement-Dr. Kai Whiting    Patient Name  Percy Bolton  Date of procedure  4/9/2018    Procedure  Procedure(s):  RIGHT TOTAL KNEE REPLACEMENT  (CHOICE)  Surgeon  Surgeon(s) and Role:     * Roberta Figueroa MD - Primary  Date of discharge: 4/10/2018  PCP: Julia Abdullahi MD    Follow up appointments   Follow up with Dr. Kai Whiting in 2 weeks. Call [phone] to make an appointment.  If home health has been arranged for you the agency will contact you to arrange dates/times for visits. Please call them if you do not hear from them within 24 hours after you are discharged    When to call your Orthopaedic Surgeon: Call [phone]. If you call after 5pm or on a weekend, the on call physician will be contacted   Unrelieved pain   Signs of infection-if your incision is red; continues to have drainage; drainage has a foul odor or if you have a persistent fever over 101 degrees   Signs of a blood clot in your leg-calf pain, tenderness, redness, swelling of lower leg    When to call your Primary Care Physician:   Concerns about medical conditions such as diabetes, high blood pressure, asthma, congestive heart failure   Call if blood sugars are elevated, persistent headache or dizziness, coughing or congestion, constipation or diarrhea, burning with urination, abnormal heart rate    When to call [address]ioc go to the nearest emergency room   Acute onset of chest pain, shortness of breath, difficulty breathing      Activity   Weight bearing as tolerated with walker or crutches.  Refer to pages 23-31 of your handbook for instructions and pictures   Complete your Home Exercise Program daily as instructed by your therapist.  Refer to pages 33-41 of your handbook for instructions and pictures   Get up every one hour and walk (except at night when sleeping)   Do not drive or operate heavy machinery      Incision Care   You will have staples in your knee incision; they will be removed at the surgeons office visit in 2 weeks   Keep a dressing (ABD and paper tape) on your incision and change daily. Wash your hands thoroughly before changing the dressing. Once you incision is not draining, you may leave it open to air   You may shower and get your incision wet but do not submerge your incision under water in a bath tub, hot tub or swimming pool for 6 weeks after surgery. Preventing blood clots   Take Xarelto at 12 noon daily as prescribed by Dr. Yazan Wang for 2 weeks    Pain management   Take pain medication as prescribed; decrease the amount you use as your pain lessens   Avoid alcoholic beverages while taking pain medication   Please be aware that many medications contain Tylenol. We do not want you to over medicate so please read the information below as a guide. Do not take more than 3 Grams of Tylenol in a 24 hour period. (There are 1000 milligrams in one Gram)  o 325 mg of Tylenol per tablet (do not take more than 9 tablets in 24 hours)  o 500 mg of Tylenol per tablet (do not take more than 6 tablets in 24 hours)   You may place an ice bag on your knee for 15-20 minutes after exercising    Diet   Resume usual diet; drink plenty of fluids; eat foods high in fiber   You may want to take a stool softener (such as Senokot-S or Colace) to prevent constipation while you are taking pain medication.   If constipation occurs, take a laxative (such as Dulcolax tablets, Milk of Magnesia, or a suppository)    [facility] Protocol (to be followed by [address])  Nursing-per physicians order   Complete head to toe assessment, vital signs   Staples will be removed in the surgeons office at 2 week visit   Medication reconciliation   Review pain management   Manage chronic medical conditions    Physical Therapy-per physicians order  Weight bearing status:  Precautions at Admission: Fall, WBAT  Left Side Weight Bearing: Full  Right Side Weight Bearing: As tolerated  ? Mobility Status:  Supine to Sit: Supervision  Sit to Stand: Stand-by assistance  Sit to Supine:  (NT- returned to chair)     Gait:  Distance (ft): 200 Feet (ft)  Ambulation - Level of Assistance: Stand-by assistance, Contact guard assistance, Assist x1  Assistive Device: Gait belt, Walker, rolling  Gait Abnormalities: Antalgic, Decreased step clearance, Step to gait (step to >step through gait)  ADL status overall composite:                Physical Therapy   Assessment and evaluation-bed mobility; functional transfers (bed, chair, bathroom, stairs); ambulation with equipment, car transfers, shower transfers, safety and ability to get out of house in the event of an emergency   Review weight bearing as tolerated, wean from walker or crutches as tolerated   Discuss pain management   Review how to do ADLs.  Refer to page 42 of patient handbook    Home Exercise Program-refer to pages 33-41 of patient handbook for exercises

## 2018-04-10 NOTE — PROGRESS NOTES
Bedside and Verbal shift change report given to Carson Arias RN (oncoming nurse) by Charles Campos RN (offgoing nurse). Report included the following information SBAR, Kardex, OR Summary, Procedure Summary, Intake/Output, MAR, Recent Results and Med Rec Status.

## 2018-04-10 NOTE — MED STUDENT NOTES
Ortho Daily Progress Note    4/10/2018  12:09 PM    POD:  1 Day Post-Op  S/P:  Procedure(s):  RIGHT TOTAL KNEE REPLACEMENT      Afebrile/VSS  Doing well without complaints of nausea  Pain well controlled  Calves soft/NTTP Bilaterally  No drainage or dehiscence. Dressing clean and dry  Able to straight leg raise  Compartments soft  Moving lower extremities well. Neurocirculatory exam intact and within normal range. Lab Results   Component Value Date/Time    HGB 8.6 (L) 04/10/2018 04:36 AM    INR 1.1 03/26/2018 11:47 AM         PLAN: follow up in 2 weeks with Dr. Joseph Good.   DVT prophylaxis: Xarelto 10 mg every day 14 days  WBAT with PT-mobilization  Pain Control: APAP 650 mg Q6hr  Plan to D/C: 4/10/2018      STACEY ChilelS    Have seen patient with above student and agree with exam, treatment and DC planning

## 2018-04-10 NOTE — PROGRESS NOTES
Problem: Mobility Impaired (Adult and Pediatric)  Goal: *Acute Goals and Plan of Care (Insert Text)  Physical Therapy Goals  Initiated 4/9/2018    1. Patient will move from supine to sit and sit to supine , scoot up and down and roll side to side in bed with independence within 4 days. 2. Patient will perform sit to stand with modified independence within 4 days. 3. Patient will ambulate with modified independence for 300 feet with the least restrictive device within 4 days. 4. Patient will ascend/descend 5 stairs with one handrail(s) with modified independence within 4 days. 5. Patient will perform home exercise program per protocol with independence within 4 days. 6. Patient will demonstrate AROM 0-90 degrees in operative joint within 4 days. physical Therapy TREATMENT  Patient: Luzma Aguilar (77 y.o. female)  Date: 4/10/2018  Diagnosis: DEGENERATIVE JOINT DISEASE RIGHT KNEE  Arthritis of right knee Arthritis of right knee  Procedure(s) (LRB):  RIGHT TOTAL KNEE REPLACEMENT  (CHOICE) (Right) 1 Day Post-Op  Precautions: Fall, WBAT  Chart, physical therapy assessment, plan of care and goals were reviewed. ASSESSMENT:  Pt progressing well towards acute PT goals. Amb ~200feet w/ SBA using RW. Initially amb w/ step to gait, but w/ prolonged amb and encouragement, pt able to amb w/ step through, reciprocal gait pattern; antalgic gait still noted. Pt completed stair training, negotiated 4 steps using left rail and cane in opposite hand. Pt returned to chair at bedside, able to self dress independently; also able to stand from chair w/ CGA to pull up pants. Pt remained sitting up in chair w/ all needs in reach. Will f/u for afternoon PT; anticipate pt clearing PT then.     Progression toward goals:  [x]      Improving appropriately and progressing toward goals  []      Improving slowly and progressing toward goals  []      Not making progress toward goals and plan of care will be adjusted     PLAN:  Patient continues to benefit from skilled intervention to address the above impairments. Continue treatment per established plan of care. Discharge Recommendations:  Home Health  Further Equipment Recommendations for Discharge:  None; owns RW     SUBJECTIVE:   Patient stated Can I get dressed too?     OBJECTIVE DATA SUMMARY:   Critical Behavior:  Neurologic State: Alert  Orientation Level: Oriented X4        Range of Motion:                          Functional Mobility Training:  Bed Mobility:     Supine to Sit: Supervision  Sit to Supine:  (NT- returned to chair)  Scooting: Supervision        Transfers:  Sit to Stand: Stand-by assistance  Stand to Sit: Stand-by assistance                             Balance:  Sitting: Intact  Standing: Intact  Ambulation/Gait Training:  Distance (ft): 200 Feet (ft)  Assistive Device: Gait belt;Walker, rolling  Ambulation - Level of Assistance: Stand-by assistance;Contact guard assistance;Assist x1        Gait Abnormalities: Antalgic;Decreased step clearance; Step to gait (step to >step through gait)  Right Side Weight Bearing: As tolerated  Left Side Weight Bearing: Full  Base of Support: Widened  Stance: Right decreased  Speed/Lissy: Pace decreased (<100 feet/min)  Step Length: Left shortened;Right shortened                   Stairs:  Number of Stairs Trained: 4  Stairs - Level of Assistance: Contact guard assistance; Adaptive equipment [facility])  Rail Use: Left  (SPC in opposite hand)    Pain:  Pain Scale 1: Numeric (0 - 10)  Pain Intensity 1: 7  Pain Location 1: Knee  Pain Orientation 1: Right  Pain Description 1: Aching  Pain Intervention(s) 1: Medication (see MAR)  Activity Tolerance:   Good  Please refer to the flowsheet for vital signs taken during this treatment.   After treatment:   [x] Patient left in no apparent distress sitting up in chair  [] Patient left in no apparent distress in bed  [x] Call bell left within reach  [x] Nursing notified  [] Caregiver present  [] Bed alarm activated    COMMUNICATION/COLLABORATION:   The patients plan of care was discussed with: Registered Nurse    Adelaide Hagan PTA   Time Calculation: 30 mins

## 2018-04-10 NOTE — PROGRESS NOTES
Care Management Interventions  PCP Verified by CM: Yes (Dr. Radha Brandon)  Palliative Care Criteria Met (RRAT>21 & CHF Dx)?: No  Mode of Transport at Discharge: Self  Transition of Care Consult (CM Consult): [address]: Yes  MyChart Signup: No  Discharge Durable Medical Equipment: No (owns RW)  Health Maintenance Reviewed: Yes  Physical Therapy Consult: Yes  Occupational Therapy Consult: No  Speech Therapy Consult: No  Current Support Network: Lives with Spouse  Confirm Follow Up Transport: Family  Plan discussed with Pt/Family/Caregiver: Yes  Freedom of Choice Offered: Yes (NA)  [place] Resource Information Provided?: No  Discharge Location  Discharge Placement: Home with home health    Reason for Admission:      Right Total Knee Replacement  RRAT Score:      10  Plan for utilizing home health: [address]    Likelihood of Readmission:   no      Transition of Care Plan:   [facility] accepted the case.  ROBB

## 2018-04-10 NOTE — PROGRESS NOTES
I have reviewed discharge instructions and RX  with the patient and spouse. Rx filled at [address] [place] [address] and are with the pt. The patient and spouse verbalized understanding. Opportunities for questions were provided. Vital signs are stable. Adequate I/Os. IV taken out and pressure dressing applied. All belongings are with the patient. Discharge via wheelchair with volunteer.

## 2018-04-10 NOTE — PROGRESS NOTES
9:34 PM  Bedside and Verbal shift change report given to Geovanny Goodson RN (oncoming nurse) by Yeimy dOen RN (offgoing nurse). Report included the following information SBAR, OR Summary, Procedure Summary, Intake/Output, MAR and Recent Results.

## 2018-04-10 NOTE — PROGRESS NOTES
Physical Therapy  4/10/2018    F/u w/ pt this afternoon for additional gait training. Pt seated at EOB, awaiting volunteers for w/c transport to d/c. Pt reporting confidence and conformability w/ mobility. Briefly reviewed/verbalized  stair training and car transfers w/pt. Pt has no additional concerns for regarding PT and ready for d/c. Pt amb ~200 feet w/ CGA-SBA and completed 4 steps this AM (CGA) using cane and left rail this morning. Pt ready for d/c home w/ HHPT and  assist. Will f/u w/pt if any changes in d/c. RN aware.      Adelaide Means, PTA

## 2018-04-10 NOTE — PROGRESS NOTES
[address]  SBAR Bundled Payment Handoff     FROM:                                TO: [facility]                                                      ([address] [place] [address] or Facility name)  [address]  [address] [id number]  Dept: [address]: [phone]                                      Room#:  [id number]                                                      Discharging Nurse:  Ericka Chavez RN  Unit AS#:[id number]         SITUATION      ASAScore: ASA 2 - Patient with mild systemic disease with no functional limitations    Admitted:  4/9/2018  Hospital Day: 2      Attending Provider:  Beckie Lewis MD     Consultations:  None    PCP:  He Curran MD   [phone]     Admitting Dx:  DEGENERATIVE JOINT DISEASE RIGHT KNEE  Arthritis of right knee       Principal Problem:    Arthritis of right knee (4/9/2018)      1 Day Post-Op of   Procedure(s):  RIGHT TOTAL KNEE REPLACEMENT  (CHOICE)   BY: Beckie Lewis MD             ON: 4/9/2018                  Code Status: Full Code             Advance Directive? No Doesnt Have (Send w/patient)     Isolation:  There are currently no Active Isolations       MDRO: No current active infections    BACKGROUND     Allergies:   Allergies   Allergen Reactions    Lactose Diarrhea and Nausea Only    Sulfa (Sulfonamide Antibiotics) Hives       Past Medical History:   Diagnosis Date    Autonomic dysfunction 12/4/98    tilt test with marked heart rate variablility and drop in BP without hemodynamic collapse    Cancer ([address] 1997    left breast lumpectomy , Rx RT    Diabetes ([address]     pre-diabetic, on metformin    Dilated cardiomyopathy ([address] 2/8/06    nonischemic    Elevated triglycerides with high cholesterol 11/21/2014    Essential hypertension 2/9/2017    Hx MRSA infection 3/26/2018    [place] Hypothyroidism 1994    Dr. Morro Cabrera.    Ill-defined condition     high cholesterol    Insulin resistance 11/21/2014    Mixed hyperlipidemia with apolipoprotein E2 variant 11/21/2014    Nausea & vomiting     Osteoarthritis     diffuse. Dr. Hillary Stanley Other and unspecified hyperlipidemia 12/21/2010    PONV (postoperative nausea and vomiting)     Psychiatric disorder     anxiety    PVC's 1994    Sleep apnea     Not wearing CPAP    Vitamin D deficiency 11/21/2014       Past Surgical History:   Procedure Laterality Date    HX APPENDECTOMY  1969    HX BREAST AUGMENTATION Bilateral 12/18/2015    REVISION BILATERAL RECONSTRUCTED BREAST, FAT GRAFTING TO CHEST WALL, NIPPLE RECONSTRUCTION performed by Meghan Guzman MD at [facility] Bilateral 10/14/2016    REVISION BILATERAL BREAST RECONSTRUCTION / FAT GRAFTING TO CHEST WALL; REVISION OF ABDOMINAL SCAR performed by Meghan Guzman MD at [address] HX BREAST LUMPECTOMY Left 1997    lumpectomy    HX BREAST RECONSTRUCTION Bilateral 9/28/2015    REVISION OF BILATERAL RECONSTRUCTED BREASTS, FAT GRAFTING TO CHEST WALL,  REVISION OF ABDOMINAL SCAR performed by Meghan Guzman MD at [address] HX CATARACT REMOVAL Bilateral 2008    with lens implants    HX CHOLECYSTECTOMY  2015    HX COLONOSCOPY  2014    HX ENDOSCOPY  2014    [address]    LVEF 60%. Normal. Dr. nEa Moran.  HX HERNIA REPAIR  4/22 16    HX HYSTERECTOMY  1986    left 1 ovary    HX MASTECTOMY  5/2015    BL mastectomy    HX ORTHOPAEDIC Left 1988    foot surgery    HX SALPINGO-OOPHORECTOMY  1988    remaining ovary removed    HX WISDOM TEETH EXTRACTION         Prior to Admission Medications   Prescriptions Last Dose Informant Patient Reported? Taking? CYANOCOBALAMIN (VITAMIN B-12 IJ) 4/8/2018 at Unknown time Self Yes Yes   Sig: by Injection route every fourteen (14) days. DULoxetine (CYMBALTA) 30 mg capsule 4/9/2018 at 0700  Yes Yes   Sig: Take 30 mg by mouth daily.    aspirin delayed-release 81 mg tablet 4/2/2018 at Unknown time  Yes Yes   Sig: Take 162 mg by mouth nightly. atorvastatin (LIPITOR) 10 mg tablet 4/8/2018 at Unknown time  No Yes   Sig: TAKE 1 TABLET NIGHTLY   cholecalciferol, VITAMIN D3, (VITAMIN D3) 5,000 unit tab tablet 4/8/2018 at Unknown time  Yes Yes   Sig: Take 5,000 Units by mouth daily. fenofibrate nanocrystallized (TRICOR) 145 mg tablet 4/8/2018 at Unknown time  No Yes   Sig: Take 1 Tab by mouth daily. Patient taking differently: Take 145 mg by mouth nightly. levothyroxine (SYNTHROID) 137 mcg tablet 4/9/2018 at 0700  Yes Yes   Sig: Take 137 mcg by mouth Daily (before breakfast). losartan (COZAAR) 50 mg tablet 4/8/2018 at 1900  No Yes   Sig: Take 1 Tab by mouth daily. metFORMIN (GLUCOPHAGE) 1,000 mg tablet 4/8/2018 at 0700  No Yes   Sig: Take 1 Tab by mouth two (2) times daily (with meals). metoprolol succinate (TOPROL-XL) 25 mg XL tablet 4/9/2018 at 0700  No Yes   Sig: Take 1 Tab by mouth daily. raloxifene (EVISTA) 60 mg tablet 4/9/2018 at 0700 Self Yes Yes   Sig: Take 60 mg by mouth daily. Facility-Administered Medications: None       Vaccinations: There is no immunization history on file for this patient. ASSESSMENT   Age: 76 y.o.              Gender: female        Height: Height: 5' 4\" (162.6 cm)                    Weight:Weight: 77.6 kg (171 lb)     Patient Vitals for the past 8 hrs:   Temp Pulse Resp BP SpO2   04/10/18 0837 98 °F (36.7 °C) 75 15 108/68 97 %            Active Orders   Diet    DIET REGULAR       Orientation: Orientation Level: Oriented X4    Active Lines/Drains:  (Peg Tube / Damian / CL or S/L?):no    Urinary Status: Voiding      Last BM: Last Bowel Movement Date: 04/05/18     Skin Integrity: Incision (comment) (right knee)   Wound Knee Right-DRESSING STATUS: Changed per order    Wound Knee Right-DRESSING TYPE: Elastic bandage    Mobility: Slightly limited   Weight Bearing Status: WBAT (Weight Bearing as Tolerated)      Gait Training  Assistive Device: Gait belt, Walker, rolling  Ambulation - Level of Assistance: Stand-by assistance, Contact guard assistance, Assist x1  Distance (ft): 200 Feet (ft)  Stairs - Level of Assistance: Contact guard assistance, Adaptive equipment (SPC)  Number of Stairs Trained: 4  Rail Use: Left  (SPC in opposite hand)     On Anticoagulation? YES  Xarelto                                      Last dose:  4/10/2018at 1100    Pain Medications given:  Oxycodone 10mg                                Last dose: 4/10/2018 at  1245    Lab Results   Component Value Date/Time    Glucose 138 (H) 04/10/2018 04:36 AM    Hemoglobin A1c 5.4 03/26/2018 11:47 AM    INR 1.1 03/26/2018 11:47 AM    INR 1.0 09/14/2015 12:06 PM    HGB 8.6 (L) 04/10/2018 04:36 AM    HGB 11.6 03/26/2018 11:47 AM    HGB 12.1 04/08/2016 11:53 AM    HGB 11.9 12/04/2015 10:44 AM       Readmission Risks:  Score:         RECOMMENDATION     See After Visit Summary (AVS) for:  · Discharge instructions  · After [address]   · Medication Reconciliation          [facility] Orthopaedic Nurse Navigator  JALEN Betancourt, RN-BC       Office  [phone]  Cell      [phone]  Fax      [phone]  [email or web address]             . Phj[place]

## 2018-04-10 NOTE — PROGRESS NOTES
Spiritual Care Partner Volunteer visited patient in Rm 571 on 4/10/18.   Documented by:  Chaplain Hernandez MDiv, MS, [address] ([id number])

## 2018-04-11 ENCOUNTER — HOME CARE VISIT (OUTPATIENT)
Dept: SCHEDULING | Facility: HOME HEALTH | Age: 68
End: 2018-04-11
Payer: MEDICARE

## 2018-04-11 VITALS
SYSTOLIC BLOOD PRESSURE: 150 MMHG | HEIGHT: 64 IN | DIASTOLIC BLOOD PRESSURE: 80 MMHG | BODY MASS INDEX: 29.37 KG/M2 | OXYGEN SATURATION: 97 % | TEMPERATURE: 99.1 F | WEIGHT: 172 LBS | RESPIRATION RATE: 18 BRPM | HEART RATE: 87 BPM

## 2018-04-11 PROCEDURE — 3331090001 HH PPS REVENUE CREDIT

## 2018-04-11 PROCEDURE — G0151 HHCP-SERV OF PT,EA 15 MIN: HCPCS

## 2018-04-11 PROCEDURE — 3331090002 HH PPS REVENUE DEBIT

## 2018-04-11 PROCEDURE — G0299 HHS/HOSPICE OF RN EA 15 MIN: HCPCS

## 2018-04-11 PROCEDURE — 400013 HH SOC

## 2018-04-12 VITALS
TEMPERATURE: 99.2 F | OXYGEN SATURATION: 97 % | SYSTOLIC BLOOD PRESSURE: 148 MMHG | HEART RATE: 88 BPM | DIASTOLIC BLOOD PRESSURE: 84 MMHG

## 2018-04-12 PROCEDURE — 3331090001 HH PPS REVENUE CREDIT

## 2018-04-12 PROCEDURE — 3331090002 HH PPS REVENUE DEBIT

## 2018-04-13 ENCOUNTER — HOME CARE VISIT (OUTPATIENT)
Dept: SCHEDULING | Facility: HOME HEALTH | Age: 68
End: 2018-04-13
Payer: MEDICARE

## 2018-04-13 VITALS
HEART RATE: 97 BPM | SYSTOLIC BLOOD PRESSURE: 122 MMHG | TEMPERATURE: 99.5 F | OXYGEN SATURATION: 97 % | DIASTOLIC BLOOD PRESSURE: 69 MMHG

## 2018-04-13 PROCEDURE — G0151 HHCP-SERV OF PT,EA 15 MIN: HCPCS

## 2018-04-13 PROCEDURE — 3331090002 HH PPS REVENUE DEBIT

## 2018-04-13 PROCEDURE — 3331090001 HH PPS REVENUE CREDIT

## 2018-04-14 PROCEDURE — 3331090002 HH PPS REVENUE DEBIT

## 2018-04-14 PROCEDURE — 3331090001 HH PPS REVENUE CREDIT

## 2018-04-15 PROCEDURE — 3331090001 HH PPS REVENUE CREDIT

## 2018-04-15 PROCEDURE — 3331090002 HH PPS REVENUE DEBIT

## 2018-04-16 PROCEDURE — 3331090001 HH PPS REVENUE CREDIT

## 2018-04-16 PROCEDURE — 3331090002 HH PPS REVENUE DEBIT

## 2018-04-17 PROCEDURE — 3331090002 HH PPS REVENUE DEBIT

## 2018-04-17 PROCEDURE — 3331090001 HH PPS REVENUE CREDIT

## 2018-04-18 PROCEDURE — 3331090001 HH PPS REVENUE CREDIT

## 2018-04-18 PROCEDURE — 3331090002 HH PPS REVENUE DEBIT

## 2018-04-19 ENCOUNTER — HOME CARE VISIT (OUTPATIENT)
Dept: SCHEDULING | Facility: HOME HEALTH | Age: 68
End: 2018-04-19
Payer: MEDICARE

## 2018-04-19 VITALS
SYSTOLIC BLOOD PRESSURE: 140 MMHG | OXYGEN SATURATION: 97 % | TEMPERATURE: 98.6 F | HEART RATE: 91 BPM | DIASTOLIC BLOOD PRESSURE: 70 MMHG

## 2018-04-19 PROCEDURE — G0151 HHCP-SERV OF PT,EA 15 MIN: HCPCS

## 2018-04-19 PROCEDURE — 3331090002 HH PPS REVENUE DEBIT

## 2018-04-19 PROCEDURE — 3331090001 HH PPS REVENUE CREDIT

## 2018-04-19 PROCEDURE — 3331090003 HH PPS REVENUE ADJ

## 2018-05-23 ENCOUNTER — OFFICE VISIT (OUTPATIENT)
Dept: NEUROLOGY | Age: 68
End: 2018-05-23

## 2018-05-23 VITALS
OXYGEN SATURATION: 98 % | SYSTOLIC BLOOD PRESSURE: 140 MMHG | HEART RATE: 100 BPM | BODY MASS INDEX: 29.37 KG/M2 | DIASTOLIC BLOOD PRESSURE: 86 MMHG | HEIGHT: 64 IN | WEIGHT: 172 LBS

## 2018-05-23 DIAGNOSIS — E11.42 DIABETIC PERIPHERAL NEUROPATHY (HCC): Primary | ICD-10-CM

## 2018-05-23 DIAGNOSIS — M54.16 LUMBAR RADICULOPATHY, RIGHT: ICD-10-CM

## 2018-05-23 RX ORDER — PREGABALIN 50 MG/1
50 CAPSULE ORAL 2 TIMES DAILY
Qty: 60 CAP | Refills: 1 | Status: SHIPPED | OUTPATIENT
Start: 2018-05-23 | End: 2018-06-25

## 2018-05-23 RX ORDER — PREDNISONE 10 MG/1
TABLET ORAL
Qty: 21 TAB | Refills: 0 | Status: SHIPPED | OUTPATIENT
Start: 2018-05-23 | End: 2018-10-24 | Stop reason: ALTCHOICE

## 2018-05-23 NOTE — PROGRESS NOTES
Interval HPI:   This is a 76 y.o. female who is following up for     Chief Complaint   Patient presents with    Neuropathy     At last visit, discussed EMG results (mild polyneuropathy and right lumbar radiculopathy). Continued to c/o tingling in both legs and less frequent pain down right leg. Checked labs: normal TSH, SPEP, B12, and BMP. Unclear if the mild peripheral neuropathy is due to her pre-DM or her hx of autonomic dysfunction (dx in 1998). Couldn't tolerate Gabapentin. Changed to Cymbalta 30 mg x 1 month then up to 60 mg/ day (for depression sx and to help with neuropathy pain). D/w her for right lumbar radicular pain, advised her to do PT (given referral at a prior visit) and if not improving we could order MRI L-spine for further evaluation. Since starting Cymbalta, not noticing the neuropathy pains in her lower legs as much   Mood isn't better, no worse, and having crying spells more often  Mood was better on Zoloft  Had knee surgery 6 weeks ago  3 weeks later started having recurrence/ worsening of right lumbar radicular pain  Describes it as sharp, severe, radiating from right buttock down side of right leg and calf, top of foot  Has done 4 weeks of PT for her knee at this point  Didn't start PT for her back as she had to undergo knee surgery          Brief ROS: as above or otherwise negative  There have been no significant changes in PMHx, PSHx, SHx except as noted above.      Past Medical History:   Diagnosis Date    Autonomic dysfunction 12/4/98    tilt test with marked heart rate variablility and drop in BP without hemodynamic collapse    Cancer ([facility] 1997    left breast lumpectomy , Rx RT    Diabetes ([address]     pre-diabetic, on metformin    Dilated cardiomyopathy ([facility] 2/8/06    nonischemic    Elevated triglycerides with high cholesterol 11/21/2014    Essential hypertension 2/9/2017    Hx MRSA infection 3/26/2018    Jaqueline Ramon Hypothyroidism 1994    Dr. Claudean Muscat.   [address] [place] [address] Ill-defined condition     high cholesterol    Insulin resistance 11/21/2014    Mixed hyperlipidemia with apolipoprotein E2 variant 11/21/2014    Nausea & vomiting     Osteoarthritis     diffuse. Dr. Marroquin Plain Other and unspecified hyperlipidemia 12/21/2010    PONV (postoperative nausea and vomiting)     Psychiatric disorder     anxiety    [facility]    Sleep apnea     Not wearing CPAP    Vitamin D deficiency 11/21/2014         Allergies   Allergen Reactions    Lactose Diarrhea and Nausea Only    Sulfa (Sulfonamide Antibiotics) Hives     Current Outpatient Prescriptions   Medication Sig Dispense Refill    pregabalin (LYRICA) 50 mg capsule Take 1 Cap by mouth two (2) times a day. Max Daily Amount: 100 mg. Indications: Diabetic Peripheral Neuropathy 60 Cap 1    predniSONE (DELTASONE) 10 mg tablet Take in AM with food. Day 1: 6 tabs   Day 2: 5 tabs   Day 3: 4 tabs    Day 4: 3 tabs    Day 5: 2 tabs   Day 6: 1 tab. Then stop 21 Tab 0    fenofibrate nanocrystallized (TRICOR) 145 mg tablet Take 145 mg by mouth daily. Nightly      oxyCODONE IR (ROXICODONE) 5 mg immediate release tablet Take 1 Tab by mouth every six (6) hours as needed. Max Daily Amount: 20 mg. Indications: Pain 50 Tab 0    metoprolol succinate (TOPROL-XL) 25 mg XL tablet Take 1 Tab by mouth daily. 90 Tab 3    metFORMIN (GLUCOPHAGE) 1,000 mg tablet Take 1 Tab by mouth two (2) times daily (with meals). 180 Tab 3    losartan (COZAAR) 50 mg tablet Take 1 Tab by mouth daily. 90 Tab 3    fenofibrate nanocrystallized (TRICOR) 145 mg tablet Take 1 Tab by mouth daily. (Patient taking differently: Take 145 mg by mouth nightly.) 90 Tab 3    atorvastatin (LIPITOR) 10 mg tablet TAKE 1 TABLET NIGHTLY (Patient taking differently: Take 10 mg by mouth daily. TAKE 1 TABLET NIGHTLY) 90 Tab 3    aspirin delayed-release 81 mg tablet Take 162 mg by mouth nightly.  levothyroxine (SYNTHROID) 137 mcg tablet Take 137 mcg by mouth Daily (before breakfast).  cholecalciferol, VITAMIN D3, (VITAMIN D3) 5,000 unit tab tablet Take 5,000 Units by mouth daily.  raloxifene (EVISTA) 60 mg tablet Take 60 mg by mouth daily.  CYANOCOBALAMIN (VITAMIN B-12 IJ) by Injection route every fourteen (14) days. SQ      acetaminophen (TYLENOL) 500 mg tablet Take 1,000 mg by mouth three (3) times daily as needed for Pain.  rivaroxaban (XARELTO) 10 mg tablet Take 1 Tab by mouth daily (with lunch). Indications: KNEE REPLACEMENT DEEP VEIN THROMBOSIS PREVENTION 14 Tab 0    senna-docusate (PERICOLACE) 8.6-50 mg per tablet Take 1 Tab by mouth two (2) times a day. Indications: constipation 60 Tab 0       Physical Exam  Blood pressure 140/86, pulse 100, height 5' 4\" (1.626 m), weight 78 kg (172 lb), SpO2 98 %. No acute distress  Neck: no stiffness  Exts: no edema    Focused Neurological Exam     Mental status: Alert and oriented to person, place situation. Language: normal fluency and comprehension; no dysarthria. CNs:   Visual fields grossly normal  Extraocular movements intact, no nystagmus  Face appears symmetric and facial strength normal.    Hearing is intact to casual conversation. Sensory: intact LT, temp in both feet and lower legs  Motor: 5/ 5 strength in left leg; 5-/ 5 strength in left leg (limted d/t recent knee surgery). Reflexes: 2+ left patellar; right patellar not tested d/t recent surgery  Gait: antalgic, favoring right leg    Impression      ICD-10-CM ICD-9-CM    1. Diabetic peripheral neuropathy (HCC) E11.42 250.60 pregabalin (LYRICA) 50 mg capsule     357.2    2. Lumbar radiculopathy, right M54.16 724. 4 pregabalin (LYRICA) 50 mg capsule      MRI LUMB SPINE WO CONT      predniSONE (DELTASONE) 10 mg tablet       Stop cymbalta  Restart Zoloft (pt has med at home)  Rx'd Lyrica 50 mg BID for diabetes related peripheral neuropathy    Advised pt to ask Ortho if she can start PT for her right lumbar radicular pain, in addition the ongoing Knee PT. Advised pt to ask Ortho for clearance (to be sure it doesn't affect her healing) regarding starting prednisone taper for her right leg pain. Ordered MRI L-spine to evaluate for persisting radicular complaints. F/u in 2 months to go over results. If pain persists will discuss doing lumbar VAIBHAV.       Signed By: Heri Johnston MD     May 23, 2018

## 2018-05-23 NOTE — PROGRESS NOTES
Neuropathy follow up however patient has a complaint of right sided sciatica pain that radiates down right leg. She states it started 3 weeks ago. She had a right knee replacement 6 weeks ago and is not sure if its related. She has had similar pain in the past- but it has been a while.

## 2018-05-23 NOTE — MR AVS SNAPSHOT
[address] [facility] [address] [place] [address] [phone] Patient: Jackie Lewis MRN: [id number] HMX:[birth year] Visit Information Date & Time Provider Department Dept. Phone Encounter #  
 5/23/2018  1:40 PM Neisha Fajardo MD [facility] Neurology [facility] [phone] [id number] Follow-up Instructions Return in about 2 months (around 7/23/2018). Your Appointments 3/26/2019  9:00 AM  
ESTABLISHED PATIENT with Ortiz Lopez MD  
[facility] ([facility]) Appt Note: annual  
 [address] [facility] [address] [place] [address]  
54 [facility] Upcoming Health Maintenance Date Due Hepatitis C Screening 1950 DTaP/Tdap/Td series (1 - Tdap) 3/9/1971 BREAST CANCER SCRN MAMMOGRAM 3/9/2000 FOBT Q 1 YEAR AGE 50-75 3/9/2000 ZOSTER VACCINE AGE 60> 1/9/2010 GLAUCOMA SCREENING Q2Y 3/9/2015 Pneumococcal 65+ Low/Medium Risk (1 of 2 - PCV13) 3/9/2015 MEDICARE YEARLY EXAM 3/14/2018 Influenza Age 5 to Adult 8/1/2018 Allergies as of 5/23/2018  Review Complete On: 5/23/2018 By: Pita Mcdowell LPN Severity Noted Reaction Type Reactions Lactose  03/26/2018    Diarrhea, Nausea Only Sulfa (Sulfonamide Antibiotics)  07/16/2010    Hives Current Immunizations  Never Reviewed No immunizations on file. Not reviewed this visit You Were Diagnosed With   
  
 Codes Comments Diabetic peripheral neuropathy (HCC)    -  Primary ICD-10-CM: E11.42 
ICD-9-CM: 250.60, 357.2 Lumbar radiculopathy, right     ICD-10-CM: M54.16 
ICD-9-CM: 724.4 Vitals BP Pulse Height(growth percentile) Weight(growth percentile) SpO2 BMI  
 140/86 (BP 1 Location: Left arm, BP Patient Position: Sitting) 100 5' 4\" (1.626 m) 172 lb (78 kg) 98% 29.52 kg/m2 OB Status Smoking Status Hysterectomy Never Smoker BMI and BSA Data Body Mass Index Body Surface Area  
 29.52 kg/m 2 1.88 m 2 Preferred Pharmacy Pharmacy Name Phone  [address] [phone] Your Updated Medication List  
  
   
This list is accurate as of 5/23/18  2:18 PM.  Always use your most recent med list.  
  
  
  
  
 acetaminophen 500 mg tablet Commonly known as:  TYLENOL Take 1,000 mg by mouth three (3) times daily as needed for Pain. aspirin delayed-release 81 mg tablet Take 162 mg by mouth nightly. atorvastatin 10 mg tablet Commonly known as:  LIPITOR  
TAKE 1 TABLET NIGHTLY  
  
 cholecalciferol (VITAMIN D3) 5,000 unit Tab tablet Commonly known as:  VITAMIN D3 Take 5,000 Units by mouth daily. EVISTA 60 mg tablet Generic drug:  raloxifene Take 60 mg by mouth daily. * fenofibrate nanocrystallized 145 mg tablet Commonly known as:  [employer] Take 145 mg by mouth daily. Nightly * fenofibrate nanocrystallized 145 mg tablet Commonly known as:  [employer] Take 1 Tab by mouth daily. levothyroxine 137 mcg tablet Commonly known as:  SYNTHROID Take 137 mcg by mouth Daily (before breakfast). losartan 50 mg tablet Commonly known as:  COZAAR Take 1 Tab by mouth daily. metFORMIN 1,000 mg tablet Commonly known as:  GLUCOPHAGE Take 1 Tab by mouth two (2) times daily (with meals). metoprolol succinate 25 mg XL tablet Commonly known as:  TOPROL-XL Take 1 Tab by mouth daily. oxyCODONE IR 5 mg immediate release tablet Commonly known as:  Ara Balk Take 1 Tab by mouth every six (6) hours as needed. Max Daily Amount: 20 mg. Indications: Pain  
  
 predniSONE 10 mg tablet Commonly known as:  Alcario Shires Take in AM with food. Day 1: 6 tabs   Day 2: 5 tabs   Day 3: 4 tabs    Day 4: 3 tabs    Day 5: 2 tabs   Day 6: 1 tab. Then stop pregabalin 50 mg capsule Commonly known as:  Darius Huerta Take 1 Cap by mouth two (2) times a day. Max Daily Amount: 100 mg. Indications: Diabetic Peripheral Neuropathy  
  
 rivaroxaban 10 mg tablet Commonly known as:  Shayy Mustard Take 1 Tab by mouth daily (with lunch). Indications: KNEE REPLACEMENT DEEP VEIN THROMBOSIS PREVENTION  
  
 senna-docusate 8.6-50 mg per tablet Commonly known as:  Arthur Romerobon Take 1 Tab by mouth two (2) times a day. Indications: constipation VITAMIN B-12 INJECTION  
by Injection route every fourteen (14) days. SQ  
  
 * Notice: This list has 2 medication(s) that are the same as other medications prescribed for you. Read the directions carefully, and ask your doctor or other care provider to review them with you. Prescriptions Printed Refills  
 pregabalin (LYRICA) 50 mg capsule 1 Sig: Take 1 Cap by mouth two (2) times a day. Max Daily Amount: 100 mg. Indications: Diabetic Peripheral Neuropathy Class: Print Route: Oral  
 predniSONE (DELTASONE) 10 mg tablet 0 Sig: Take in AM with food. Day 1: 6 tabs   Day 2: 5 tabs   Day 3: 4 tabs    Day 4: 3 tabs    Day 5: 2 tabs   Day 6: 1 tab. Then stop Class: Print Follow-up Instructions Return in about 2 months (around 7/23/2018). To-Do List   
 05/23/2018 Imaging:  MRI LUMB SPINE WO CONT Introducing [facility] & [facility] SERVICES! Dear Lucio Talbert: Thank you for requesting a [employer] account. Our records indicate that you already have an active [employer] account. You can access your account anytime at [email or web address] Did you know that you can access your hospital and ER discharge instructions at any time in [employer]? You can also review all of your test results from your hospital stay or ER visit. Additional Information If you have questions, please visit the Frequently Asked Questions section of the [employer] website at [email or web address] Remember, [employer] is NOT to be used for urgent needs. For medical emergencies, dial 911. Now available from your iPhone and Android! Please provide this summary of care documentation to your next provider. Your primary care clinician is listed as [address]. If you have any questions after today's visit, please call [phone].

## 2018-05-29 ENCOUNTER — TELEPHONE (OUTPATIENT)
Dept: NEUROLOGY | Age: 68
End: 2018-05-29

## 2018-05-29 DIAGNOSIS — F40.240 CLAUSTROPHOBIA: Primary | ICD-10-CM

## 2018-05-29 RX ORDER — DIAZEPAM 5 MG/1
TABLET ORAL
Qty: 1 TAB | Refills: 0 | Status: SHIPPED | OUTPATIENT
Start: 2018-05-29 | End: 2018-10-24

## 2018-05-29 NOTE — TELEPHONE ENCOUNTER
Contacted patient. She states she has an MRI scheduled for tomorrow 5/30/18. She is requesting something to help relax her for the study. Please advise.

## 2018-05-29 NOTE — TELEPHONE ENCOUNTER
----- Message from Corrie Santos sent at 5/29/2018  8:46 AM EDT -----  Regarding: Dr. Adalberto Otoole  The patient is requesting a call back from the nurse to discuss a procedure that she is having done tomorrow.  (s)[phone]

## 2018-05-30 ENCOUNTER — HOSPITAL ENCOUNTER (OUTPATIENT)
Dept: MRI IMAGING | Age: 68
Discharge: HOME | End: 2018-05-30
Attending: PSYCHIATRY & NEUROLOGY
Payer: MEDICARE

## 2018-05-30 DIAGNOSIS — M54.16 LUMBAR RADICULOPATHY, RIGHT: ICD-10-CM

## 2018-05-30 PROCEDURE — 72148 MRI LUMBAR SPINE W/O DYE: CPT

## 2018-05-31 NOTE — PROGRESS NOTES
No disc herniation or significant spinal stenosis on MRI L-spine. Minimal spinal canal narrowing at L4-5 with arthritic changes in facet joints at same level.

## 2018-06-19 DIAGNOSIS — E11.40 DIABETIC NEUROPATHY, PAINFUL (HCC): Primary | ICD-10-CM

## 2018-06-19 NOTE — TELEPHONE ENCOUNTER
----- Message from Daina Goldsmith sent at 6/19/2018  3:00 PM EDT -----  Regarding: /Telephone  Pt called requesting a call back in regards to the results of MRI test. Pt best contact number is [phone].

## 2018-06-20 NOTE — TELEPHONE ENCOUNTER
Contacted patient and informed her per Dr. Carmela Salazar: No disc herniation or significant spinal stenosis on MRI L-spine.  Minimal spinal canal narrowing at L4-5 with arthritic changes in facet joints at same level. Patient wants to know based on the above results, should she get the back injection. Also, she states the neuropathy is so bad in her feet it is affecting her walking. She states her feet are swollen, but she doesn't think they are actually swollen. She is not noticing a difference on the Lyrica. Patient is aware Dr Carmela Salazar is out of the office until Thursday.

## 2018-06-21 NOTE — TELEPHONE ENCOUNTER
MRI doesn't show anything compressing the nerves in her lower back, however the right leg pain she described was consistent with irritation of the right L5 nerve root. If the pain hasn't been reduced after doing PT for her back, then lumbar VAIBHAV is still an option. Pt would have to decide whether she wants to proceed with that based on her average daily pain level/ numbers. She's only on a small dose of Lyrica (50 mg BID). If she has enough Lyrica left, she can try taking 2 capsules twice a day. If she doesn't have enough left, then please pend me a Rx for Lyrica 100 mg capsule, one capsule 2 to 3 times a day for diabetic neuropathy, #60, 1 RF.     Thx

## 2018-06-22 ENCOUNTER — TELEPHONE (OUTPATIENT)
Dept: NEUROLOGY | Age: 68
End: 2018-06-22

## 2018-06-22 NOTE — TELEPHONE ENCOUNTER
----- Message from [facility] sent at 6/22/2018  2:33 PM EDT -----  Regarding: Dr. Mary Beth Dee telephone  The pt is wanting a callback with her MRI results before the weekend.       Best contact number is [phone]

## 2018-06-25 RX ORDER — PREGABALIN 100 MG/1
100 CAPSULE ORAL 2 TIMES DAILY
Qty: 60 CAP | Refills: 1 | Status: SHIPPED | OUTPATIENT
Start: 2018-06-25 | End: 2018-10-24

## 2018-06-25 NOTE — TELEPHONE ENCOUNTER
Contacted patient and informed her of Dr. Cris Zuniga response. She states she will set up PT. She states she only has enough lyrica to get her through [facility] so will need a new rx. Order pending.

## 2018-09-06 ENCOUNTER — OFFICE VISIT (OUTPATIENT)
Dept: NEUROLOGY | Age: 68
End: 2018-09-06

## 2018-09-06 ENCOUNTER — TELEPHONE (OUTPATIENT)
Dept: CARDIOLOGY CLINIC | Age: 68
End: 2018-09-06

## 2018-09-06 VITALS
OXYGEN SATURATION: 97 % | BODY MASS INDEX: 29.02 KG/M2 | HEIGHT: 64 IN | WEIGHT: 170 LBS | HEART RATE: 83 BPM | SYSTOLIC BLOOD PRESSURE: 136 MMHG | DIASTOLIC BLOOD PRESSURE: 74 MMHG

## 2018-09-06 DIAGNOSIS — G25.81 RESTLESS LEG SYNDROME: ICD-10-CM

## 2018-09-06 DIAGNOSIS — G62.9 SENSORIMOTOR NEUROPATHY: Primary | ICD-10-CM

## 2018-09-06 DIAGNOSIS — M54.10 RADICULAR SYNDROME OF RIGHT LEG: ICD-10-CM

## 2018-09-06 NOTE — TELEPHONE ENCOUNTER
Pt called stating that she has been experiencing heart palpitations. We scheduled an appointment on 10/9 but she wanted to see if she could be seen sooner. She can be reached @ [phone].      Thanks

## 2018-09-06 NOTE — TELEPHONE ENCOUNTER
Returned patient's call [phone] has been experiencing heart palpitations more than usual would like to schedule a follow up appointment. Patient would like to be seen sooner than 10/9 so she is okay to see Dr. Mike Mcneal. Advised I will have PSR call her to schedule appointment patient verbalized understanding.

## 2018-09-06 NOTE — MR AVS SNAPSHOT
[address] [facility] [address] [facility] [id number] [phone] Patient: Alex Newberry MRN: [id number] [facility]:[birth year] Visit Information Date & Time Provider Department Dept. Phone Encounter #  
 9/6/2018  1:40 PM Jaymie Wu  [facility] Neurology [facility] [phone] [id number] Follow-up Instructions Return if symptoms worsen or fail to improve. Your Appointments 10/9/2018 11:20 AM  
ESTABLISHED PATIENT with Tessie Bowie MD  
[facility] Appt Note: dx heart palpitations [address] [facility] [address] [facility] [address]  
[phone]  
  
   
 [address] [address]  
  
    
 3/26/2019  9:00 AM  
ESTABLISHED PATIENT with Tessie Bowie MD  
[facility] ([facility]) Appt Note: annual  
 [address] [facility] [place] [address] Upcoming Health Maintenance Date Due Hepatitis C Screening 1950 DTaP/Tdap/Td series (1 - Tdap) 3/9/1971 BREAST CANCER SCRN MAMMOGRAM 3/9/2000 FOBT Q 1 YEAR AGE 50-75 3/9/2000 ZOSTER VACCINE AGE 60> 1/9/2010 GLAUCOMA SCREENING Q2Y 3/9/2015 Pneumococcal 65+ Low/Medium Risk (1 of 2 - PCV13) 3/9/2015 MEDICARE YEARLY EXAM 3/14/2018 Influenza Age 5 to Adult 8/1/2018 Allergies as of 9/6/2018  Review Complete On: 9/6/2018 By: Redd Barillas LPN Severity Noted Reaction Type Reactions Lactose  03/26/2018    Diarrhea, Nausea Only Sulfa (Sulfonamide Antibiotics)  07/16/2010    Hives Current Immunizations  Never Reviewed No immunizations on file. Not reviewed this visit You Were Diagnosed With   
  
 Codes Comments Sensorimotor neuropathy    -  Primary ICD-10-CM: G62.9 ICD-9-CM: 356.9  Restless leg syndrome     ICD-10-CM: G25.81 
 ICD-9-CM: 333.94 Vitals BP Pulse Height(growth percentile) Weight(growth percentile) SpO2 BMI  
 136/74 83 5' 4\" (1.626 m) 170 lb (77.1 kg) 97% 29.18 kg/m2 OB Status Smoking Status Hysterectomy Never Smoker Vitals History BMI and BSA Data Body Mass Index Body Surface Area  
 29.18 kg/m 2 1.87 m 2 Preferred Pharmacy Pharmacy Name Phone [facility] [address] [place] [address], [address] [place] [address] [phone] Your Updated Medication List  
  
   
This list is accurate as of 9/6/18  2:05 PM.  Always use your most recent med list.  
  
  
  
  
 acetaminophen 500 mg tablet Commonly known as:  TYLENOL Take 1,000 mg by mouth three (3) times daily as needed for Pain. aspirin delayed-release 81 mg tablet Take 162 mg by mouth nightly. atorvastatin 10 mg tablet Commonly known as:  LIPITOR  
TAKE 1 TABLET NIGHTLY  
  
 cholecalciferol (VITAMIN D3) 5,000 unit Tab tablet Commonly known as:  VITAMIN D3 Take 5,000 Units by mouth daily. diazePAM 5 mg tablet Commonly known as:  VALIUM Sign MRI consent first.  Then take 1 tab, 20 minutes before MRI test.  For anxiety-claustrophobia. Need  to and from MRI. EVISTA 60 mg tablet Generic drug:  raloxifene Take 60 mg by mouth daily. * fenofibrate nanocrystallized 145 mg tablet Commonly known as:  [employer] Take 145 mg by mouth daily. Nightly * fenofibrate nanocrystallized 145 mg tablet Commonly known as:  [employer] Take 1 Tab by mouth daily. levothyroxine 137 mcg tablet Commonly known as:  SYNTHROID Take 137 mcg by mouth Daily (before breakfast). losartan 50 mg tablet Commonly known as:  COZAAR Take 1 Tab by mouth daily. metFORMIN 1,000 mg tablet Commonly known as:  GLUCOPHAGE Take 1 Tab by mouth two (2) times daily (with meals). metoprolol succinate 25 mg XL tablet Commonly known as:  TOPROL-XL Take 1 Tab by mouth daily. oxyCODONE IR 5 mg immediate release tablet Commonly known as:  Nasir Nuno Take 1 Tab by mouth every six (6) hours as needed. Max Daily Amount: 20 mg. Indications: Pain  
  
 predniSONE 10 mg tablet Commonly known as:  Marina Gelineau Take in AM with food. Day 1: 6 tabs   Day 2: 5 tabs   Day 3: 4 tabs    Day 4: 3 tabs    Day 5: 2 tabs   Day 6: 1 tab. Then stop  
  
 pregabalin 100 mg capsule Commonly known as:  Polly Hernandez Take 1 Cap by mouth two (2) times a day. Max Daily Amount: 200 mg. Indications: Diabetic Peripheral Neuropathy  
  
 rivaroxaban 10 mg tablet Commonly known as:  Princess Smaller Take 1 Tab by mouth daily (with lunch). Indications: KNEE REPLACEMENT DEEP VEIN THROMBOSIS PREVENTION  
  
 senna-docusate 8.6-50 mg per tablet Commonly known as:  Sawyer Lydia Take 1 Tab by mouth two (2) times a day. Indications: constipation VITAMIN B-12 INJECTION  
by Injection route every fourteen (14) days. SQ  
  
 * Notice: This list has 2 medication(s) that are the same as other medications prescribed for you. Read the directions carefully, and ask your doctor or other care provider to review them with you. Follow-up Instructions Return if symptoms worsen or fail to improve. [facility]! Dear Nestor Milner: Thank you for requesting a [employer] account. Our records indicate that you already have an active [employer] account. You can access your account anytime at [email or web address] Did you know that you can access your hospital and ER discharge instructions at any time in [employer]? You can also review all of your test results from your hospital stay or ER visit. Additional Information If you have questions, please visit the Frequently Asked Questions section of the [employer] website at [email or web address] Remember, [employer] is NOT to be used for urgent needs. For medical emergencies, dial 911. Now available from your iPhone and Android! Please provide this summary of care documentation to your next provider. Your primary care clinician is listed as [address]. If you have any questions after today's visit, please call [phone].

## 2018-09-06 NOTE — PROGRESS NOTES
Interval HPI:   This is a 76 y.o. female who is following up for     Chief Complaint   Patient presents with    Hip Pain    Follow-up     leg tightness     Reviewed MRI L-spine images: no significant abnormalities, mild facet arthropathy at L3-4 and L4-5. Pt reports the right leg radicular pain has resolved. She still has a sensation on bottom of feet of thickness. Reviewed EMG findings with her that showed sensorimotor peripheral neuropathy, suspected to be due ot either her hx of pre-DM or hx of autonomic dysfunction (remotely diagnosed). She tried Lyrica 50 mg BID but says it didn't help so stopped taking it. No side effects. Explained to her that the dose might not been high enough to help her neuropathy symptoms. She also describes having some abnormal sensations in her lower legs when she's lying down to sleep, keeps her awake longer than she'd like. Brief ROS: as above or otherwise negative     Allergies   Allergen Reactions    Lactose Diarrhea and Nausea Only    Sulfa (Sulfonamide Antibiotics) Hives     Current Outpatient Prescriptions   Medication Sig Dispense Refill    fenofibrate nanocrystallized (TRICOR) 145 mg tablet Take 145 mg by mouth daily. Nightly      metoprolol succinate (TOPROL-XL) 25 mg XL tablet Take 1 Tab by mouth daily. 90 Tab 3    metFORMIN (GLUCOPHAGE) 1,000 mg tablet Take 1 Tab by mouth two (2) times daily (with meals). 180 Tab 3    losartan (COZAAR) 50 mg tablet Take 1 Tab by mouth daily. 90 Tab 3    fenofibrate nanocrystallized (TRICOR) 145 mg tablet Take 1 Tab by mouth daily. (Patient taking differently: Take 145 mg by mouth nightly.) 90 Tab 3    atorvastatin (LIPITOR) 10 mg tablet TAKE 1 TABLET NIGHTLY (Patient taking differently: Take 10 mg by mouth daily. TAKE 1 TABLET NIGHTLY) 90 Tab 3    aspirin delayed-release 81 mg tablet Take 162 mg by mouth nightly.  levothyroxine (SYNTHROID) 137 mcg tablet Take 137 mcg by mouth Daily (before breakfast).  cholecalciferol, VITAMIN D3, (VITAMIN D3) 5,000 unit tab tablet Take 5,000 Units by mouth daily.  raloxifene (EVISTA) 60 mg tablet Take 60 mg by mouth daily.  CYANOCOBALAMIN (VITAMIN B-12 IJ) by Injection route every fourteen (14) days. SQ      pregabalin (LYRICA) 100 mg capsule Take 1 Cap by mouth two (2) times a day. Max Daily Amount: 200 mg. Indications: Diabetic Peripheral Neuropathy 60 Cap 1    diazePAM (VALIUM) 5 mg tablet Sign MRI consent first.  Then take 1 tab, 20 minutes before MRI test.  For anxiety-claustrophobia. Need  to and from MRI. 1 Tab 0    predniSONE (DELTASONE) 10 mg tablet Take in AM with food. Day 1: 6 tabs   Day 2: 5 tabs   Day 3: 4 tabs    Day 4: 3 tabs    Day 5: 2 tabs   Day 6: 1 tab. Then stop 21 Tab 0    acetaminophen (TYLENOL) 500 mg tablet Take 1,000 mg by mouth three (3) times daily as needed for Pain.  rivaroxaban (XARELTO) 10 mg tablet Take 1 Tab by mouth daily (with lunch). Indications: KNEE REPLACEMENT DEEP VEIN THROMBOSIS PREVENTION 14 Tab 0    senna-docusate (PERICOLACE) 8.6-50 mg per tablet Take 1 Tab by mouth two (2) times a day. Indications: constipation 60 Tab 0    oxyCODONE IR (ROXICODONE) 5 mg immediate release tablet Take 1 Tab by mouth every six (6) hours as needed. Max Daily Amount: 20 mg.  Indications: Pain 50 Tab 0       Past Medical History:   Diagnosis Date    Autonomic dysfunction 12/4/98    tilt test with marked heart rate variablility and drop in BP without hemodynamic collapse    Cancer ([address] 1997    left breast lumpectomy , Rx RT    [facility]     pre-diabetic, on metformin    Dilated cardiomyopathy ([address] 2/8/06    nonischemic    Elevated triglycerides with high cholesterol 11/21/2014    Essential hypertension 2/9/2017    Hx MRSA infection 3/26/2018    Akash Casarez Hypothyroidism 1994    Dr. Maria Del Rosario Santana.   Leandro.Say Ill-defined condition     high cholesterol    Insulin resistance 11/21/2014    Mixed hyperlipidemia with apolipoprotein E2 variant 11/21/2014    Nausea & vomiting     Osteoarthritis     diffuse. Dr. Mcgregor Cleaves Other and unspecified hyperlipidemia 12/21/2010    PONV (postoperative nausea and vomiting)     Psychiatric disorder     anxiety    PVC's 1994    Sleep apnea     Not wearing CPAP    Vitamin D deficiency 11/21/2014       Past Surgical History:   Procedure Laterality Date    HX APPENDECTOMY  1969    HX BREAST AUGMENTATION Bilateral 12/18/2015    REVISION BILATERAL RECONSTRUCTED BREAST, FAT GRAFTING TO CHEST WALL, NIPPLE RECONSTRUCTION [facility] Bilateral 10/14/2016    REVISION BILATERAL BREAST RECONSTRUCTION / FAT GRAFTING TO CHEST WALL; REVISION OF ABDOMINAL SCAR performed by Karyle Banner, MD at [address] HX BREAST LUMPECTOMY Left 1997    lumpectomy    HX BREAST RECONSTRUCTION Bilateral 9/28/2015    REVISION OF BILATERAL RECONSTRUCTED BREASTS, FAT GRAFTING TO CHEST WALL,  REVISION OF ABDOMINAL SCAR performed by Karyle Banner, MD at [address] HX CATARACT REMOVAL Bilateral 2008    with lens implants    HX CHOLECYSTECTOMY  2015    HX COLONOSCOPY  2014    HX ENDOSCOPY  2014    [address]    LVEF 60%. Normal. Dr. Holly Gallardo.     HX HERNIA REPAIR  4/22 16    HX HYSTERECTOMY  1986    left 1 ovary    HX MASTECTOMY  5/2015    BL mastectomy    HX ORTHOPAEDIC Left 1988    foot surgery    HX SALPINGO-OOPHORECTOMY  1988    remaining ovary removed    HX WISDOM TEETH EXTRACTION         Family History   Problem Relation Age of Onset    Kidney Disease Mother     Cancer Mother      ovarian, esophageal cancer    Heart Disease Father     Coronary Artery Disease Brother     No Known Problems Daughter     No Known Problems Daughter     No Known Problems Daughter     No Known Problems Daughter     Heart Attack Paternal Uncle      MI IN 40S    Heart Attack Paternal Grandmother      MI IN 40S    Anesth Problems Neg Hx        Social History     Social History    Marital status:      Spouse name: N/A    Number of children: N/A    Years of education: N/A     Occupational History    Not on file. Social History Main Topics    Smoking status: Never Smoker    Smokeless tobacco: Never Used    Alcohol use No    Drug use: No    Sexual activity: Not on file     Other Topics Concern    Not on file     Social History Narrative         Physical Exam  Blood pressure 136/74, pulse 83, height 5' 4\" (1.626 m), weight 77.1 kg (170 lb), SpO2 97 %. No acute distress  Neck: no stiffness  Skin: no rashes    Focused Neurological Exam     Mental status: Alert and oriented to person, place situation. Language: normal fluency and comprehension; no dysarthria. CNs:   Visual fields grossly normal  Extraocular movements intact, no nystagmus  Face appears symmetric and facial strength normal.    Hearing is intact to casual conversation. Sensory: reduced vibration in feet compared to knees/ hands  Intact temperature throughout legs    Motor: Normal bulk and strength in all 4 extremities. Reflexes: DTRs are symmetric, 2+ patellars, 1+ achilles   Gait: not observed    Impression      ICD-10-CM ICD-9-CM    1. Sensorimotor neuropathy G62.9 356.9    2. Restless leg syndrome G25.81 333.94    3. Radicular syndrome of right leg M54.10 724.4        1. Right lumbar radicular pain  Resolved. MRI L-spine unremarkable. 2. Sensorimotor peripheral neuropathy (from hx of pre-DM or remote hx of autonomic neuropathy)  Pt doesn't want to restart any medication    3. RLS  Pt doesn't want to try higher dose of Lyrica or other med at this point  If it gets worse, she'll call back to request a medication    4.  F/u prn

## 2018-09-06 NOTE — PROGRESS NOTES
Back pain better since completing PT. She has trouble laying on either side due to hip pain. She has an aggravating feeling of tightness and swelling, even though there is do swelling, from both knees down into her feet. She states she stopped Lyrica around the end of July because there was no relief and she didn't like the way it made her feel.

## 2018-09-10 ENCOUNTER — OFFICE VISIT (OUTPATIENT)
Dept: CARDIOLOGY CLINIC | Age: 68
End: 2018-09-10

## 2018-09-10 VITALS
HEIGHT: 64 IN | DIASTOLIC BLOOD PRESSURE: 78 MMHG | BODY MASS INDEX: 29.23 KG/M2 | SYSTOLIC BLOOD PRESSURE: 134 MMHG | RESPIRATION RATE: 16 BRPM | WEIGHT: 171.2 LBS | HEART RATE: 79 BPM | OXYGEN SATURATION: 100 %

## 2018-09-10 DIAGNOSIS — I49.3 PVC (PREMATURE VENTRICULAR CONTRACTION): Primary | ICD-10-CM

## 2018-09-10 RX ORDER — NITROFURANTOIN 25; 75 MG/1; MG/1
CAPSULE ORAL
COMMUNITY
Start: 2018-08-01 | End: 2018-09-10 | Stop reason: ALTCHOICE

## 2018-09-10 RX ORDER — SERTRALINE HYDROCHLORIDE 100 MG/1
100 TABLET, FILM COATED ORAL DAILY
COMMUNITY
Start: 2018-09-04 | End: 2020-12-09

## 2018-09-10 NOTE — MR AVS SNAPSHOT
[address] [facility] 600 1007 [facility] 
[phone] Patient: Dheeraj Jackson MRN: [id number] PUV:[id number] Visit Information Date & Time Provider Department Dept. Phone Encounter #  
 9/10/2018  9:40 AM Ian Perla DO [facility] [phone] [id number] Follow-up Instructions Return in about 6 weeks (around 10/22/2018). Follow-up and Disposition History Your Appointments 9/18/2018  1:00 PM  
PROCEDURE with HOLTER, STFRANCIS  
[facility] Appt Note: 24 hour holter per dr Vickie Hare 354 [facility] 600 [place] [address]  
[phone]  
  
   
 [address] [place] [address]  
  
    
 3/26/2019  9:00 AM  
ESTABLISHED PATIENT with Abbie Grace MD  
[facility] Appt Note: annual  
 354 [facility] [address] [place] [address]  
[address] Upcoming Health Maintenance Date Due Hepatitis C Screening 1950 DTaP/Tdap/Td series (1 - Tdap) 3/9/1971 BREAST CANCER SCRN MAMMOGRAM 3/9/2000 FOBT Q 1 YEAR AGE 50-75 3/9/2000 ZOSTER VACCINE AGE 60> 1/9/2010 GLAUCOMA SCREENING Q2Y 3/9/2015 Pneumococcal 65+ Low/Medium Risk (1 of 2 - PCV13) 3/9/2015 MEDICARE YEARLY EXAM 3/14/2018 Influenza Age 5 to Adult 8/1/2018 Allergies as of 9/10/2018  Review Complete On: 9/10/2018 By: Karmen Ayers LPN Severity Noted Reaction Type Reactions Lactose  03/26/2018    Diarrhea, Nausea Only Sulfa (Sulfonamide Antibiotics)  07/16/2010    Hives Current Immunizations  Never Reviewed No immunizations on file. Not reviewed this visit You Were Diagnosed With   
  
 Codes Comments PVC (premature ventricular contraction)    -  Primary ICD-10-CM: I49.3 ICD-9-CM: 427.69 Vitals BP Pulse Resp Height(growth percentile) Weight(growth percentile) SpO2  
 134/78 (BP 1 Location: Right arm, BP Patient Position: Sitting) 79 16 5' 4\" (1.626 m) 171 lb 3.2 oz (77.7 kg) 100% BMI OB Status Smoking Status 29.39 kg/m2 Hysterectomy Never Smoker Vitals History BMI and BSA Data Body Mass Index Body Surface Area  
 29.39 kg/m 2 1.87 m 2 Preferred Pharmacy Pharmacy Name Phone [facility] [address] [place] [address], [address] [place] [address] [phone] Your Updated Medication List  
  
   
This list is accurate as of 9/10/18 10:27 AM.  Always use your most recent med list.  
  
  
  
  
 acetaminophen 500 mg tablet Commonly known as:  TYLENOL Take 1,000 mg by mouth three (3) times daily as needed for Pain. aspirin delayed-release 81 mg tablet Take 162 mg by mouth nightly. atorvastatin 10 mg tablet Commonly known as:  LIPITOR  
TAKE 1 TABLET NIGHTLY  
  
 cholecalciferol (VITAMIN D3) 5,000 unit Tab tablet Commonly known as:  VITAMIN D3 Take 5,000 Units by mouth daily. diazePAM 5 mg tablet Commonly known as:  VALIUM Sign MRI consent first.  Then take 1 tab, 20 minutes before MRI test.  For anxiety-claustrophobia. Need  to and from MRI. EVISTA 60 mg tablet Generic drug:  raloxifene Take 60 mg by mouth daily. * fenofibrate nanocrystallized 145 mg tablet Commonly known as:  [employer] Take 145 mg by mouth daily. Nightly * fenofibrate nanocrystallized 145 mg tablet Commonly known as:  [employer] Take 1 Tab by mouth daily. levothyroxine 137 mcg tablet Commonly known as:  SYNTHROID Take 137 mcg by mouth Daily (before breakfast). losartan 50 mg tablet Commonly known as:  COZAAR Take 1 Tab by mouth daily. metFORMIN 1,000 mg tablet Commonly known as:  GLUCOPHAGE Take 1 Tab by mouth two (2) times daily (with meals). metoprolol succinate 25 mg XL tablet Commonly known as:  TOPROL-XL Take 1 Tab by mouth daily. oxyCODONE IR 5 mg immediate release tablet Commonly known as:  Franceen Ly Take 1 Tab by mouth every six (6) hours as needed. Max Daily Amount: 20 mg. Indications: Pain  
  
 predniSONE 10 mg tablet Commonly known as:  Morro Lever Take in AM with food. Day 1: 6 tabs   Day 2: 5 tabs   Day 3: 4 tabs    Day 4: 3 tabs    Day 5: 2 tabs   Day 6: 1 tab. Then stop  
  
 pregabalin 100 mg capsule Commonly known as:  Sade Lira Take 1 Cap by mouth two (2) times a day. Max Daily Amount: 200 mg. Indications: Diabetic Peripheral Neuropathy  
  
 rivaroxaban 10 mg tablet Commonly known as:  Thelmadustin Horvath Take 1 Tab by mouth daily (with lunch). Indications: KNEE REPLACEMENT DEEP VEIN THROMBOSIS PREVENTION  
  
 senna-docusate 8.6-50 mg per tablet Commonly known as:  Geoffrey Finner Take 1 Tab by mouth two (2) times a day. Indications: constipation  
  
 sertraline 100 mg tablet Commonly known as:  ZOLOFT Take 100 mg by mouth daily. VITAMIN B-12 INJECTION  
by Injection route every fourteen (14) days. SQ  
  
 * Notice: This list has 2 medication(s) that are the same as other medications prescribed for you. Read the directions carefully, and ask your doctor or other care provider to review them with you. We Performed the Following AMB POC EKG ROUTINE W/ 12 LEADS, INTER & REP [52387 CPT(R)] Follow-up Instructions Return in about 6 weeks (around 10/22/2018). Introducing [facility]! Dear Anabel Frazier: Thank you for requesting a [employer] account. Our records indicate that you already have an active [employer] account. You can access your account anytime at [email or web address] Did you know that you can access your hospital and ER discharge instructions at any time in [employer]? You can also review all of your test results from your hospital stay or ER visit. Additional Information If you have questions, please visit the Frequently Asked Questions section of the [employer] website at [email or web address] Remember, [employer] is NOT to be used for urgent needs. For medical emergencies, dial 911. Now available from your iPhone and Android! Please provide this summary of care documentation to your next provider. Your primary care clinician is listed as [address]. If you have any questions after today's visit, please call [phone].

## 2018-09-10 NOTE — PROGRESS NOTES
Ever Santiago, DO  [facility] [address] [place] [address], [address] [facility], [address] [place] [address]                                       Office [phone],[facility] [phone]       David Shoemaker is a 76 y.o. female  Presents for follow-up of palpitations    Assessment/Recommendations:    Hx of PVC  - previously on mexilitine, off sine 1/2018. Started having palpitations about 2-3 weeks ago. Symptoms happen every other day to thrice weekly. Can last up to several hours. Symptoms eased off within the last few days. No syncope, near syncope  - holter monitor  - may need prolonged event monitor if not captured  -  Hx of hypertension, orthostassis, diabetes, hypothyroid  -states thyroid levels are controlled    F/u with Dr. Grzegorz Min in 6 weeks after monitoring. Instructed to call office if symptoms become more severe. Subjective:  75 yo female with hx of nsvt/pvc previously on mexilitine therapy. Was d/c 1/2018. Did well without any palpitations until about 2-3 weeks ago. Started having palpitations every other day to thrice weekly. No syncope, near syncope. No chest pain, sob. Symptoms eased off since last Thursday. Past Medical History:   Diagnosis Date    Autonomic dysfunction 12/4/98    tilt test with marked heart rate variablility and drop in BP without hemodynamic collapse    Cancer ([address] 1997    left breast lumpectomy , Rx RT    Diabetes ([address]     pre-diabetic, on metformin    Dilated cardiomyopathy ([address] 2/8/06    nonischemic    Elevated triglycerides with high cholesterol 11/21/2014    Essential hypertension 2/9/2017    Hx MRSA infection 3/26/2018    Magdaleno Sales Hypothyroidism 1994    Dr. Lauren Zayas.    Ill-defined condition     high cholesterol    Insulin resistance 11/21/2014    Mixed hyperlipidemia with apolipoprotein E2 variant 11/21/2014    Nausea & vomiting     Osteoarthritis     diffuse.  Dr. Nando Canela Other and unspecified hyperlipidemia 12/21/2010    PONV (postoperative nausea and vomiting)     Psychiatric disorder     anxiety    PVC's 1994    Sleep apnea     Not wearing CPAP    Vitamin D deficiency 11/21/2014        Past Surgical History:   Procedure Laterality Date    HX APPENDECTOMY  1969    HX BREAST AUGMENTATION Bilateral 12/18/2015    REVISION BILATERAL RECONSTRUCTED BREAST, FAT GRAFTING TO CHEST WALL, NIPPLE RECONSTRUCTION performed by Michael Ku MD at [address] HX BREAST AUGMENTATION Bilateral 10/14/2016    REVISION BILATERAL BREAST RECONSTRUCTION / FAT GRAFTING TO CHEST WALL; REVISION OF ABDOMINAL SCAR performed by Michael Ku MD at [address] HX BREAST LUMPECTOMY Left 1997    lumpectomy    HX BREAST RECONSTRUCTION Bilateral 9/28/2015    REVISION OF BILATERAL RECONSTRUCTED BREASTS, FAT GRAFTING TO CHEST WALL,  REVISION OF ABDOMINAL SCAR performed by Michael Ku MD at [address] HX CATARACT REMOVAL Bilateral 2008    with lens implants    HX CHOLECYSTECTOMY  2015    HX COLONOSCOPY  2014    HX ENDOSCOPY  2014    [address]    LVEF 60%. Normal. Dr. Nathaniel Newsome.  HX HERNIA REPAIR  4/22 16    HX HYSTERECTOMY  1986    left 1 ovary    HX MASTECTOMY  5/2015    BL mastectomy    HX ORTHOPAEDIC Left 1988    foot surgery    HX SALPINGO-OOPHORECTOMY  1988    remaining ovary removed    HX WISDOM TEETH EXTRACTION         Current Outpatient Prescriptions on File Prior to Visit   Medication Sig Dispense Refill    fenofibrate nanocrystallized (TRICOR) 145 mg tablet Take 145 mg by mouth daily. Nightly      metoprolol succinate (TOPROL-XL) 25 mg XL tablet Take 1 Tab by mouth daily. 90 Tab 3    metFORMIN (GLUCOPHAGE) 1,000 mg tablet Take 1 Tab by mouth two (2) times daily (with meals). 180 Tab 3    fenofibrate nanocrystallized (TRICOR) 145 mg tablet Take 1 Tab by mouth daily.  (Patient taking differently: Take 145 mg by mouth nightly.) 90 Tab 3    atorvastatin (LIPITOR) 10 mg tablet TAKE 1 TABLET NIGHTLY (Patient taking differently: Take 10 mg by mouth daily. TAKE 1 TABLET NIGHTLY) 90 Tab 3    aspirin delayed-release 81 mg tablet Take 162 mg by mouth nightly.  levothyroxine (SYNTHROID) 137 mcg tablet Take 137 mcg by mouth Daily (before breakfast).  cholecalciferol, VITAMIN D3, (VITAMIN D3) 5,000 unit tab tablet Take 5,000 Units by mouth daily.  raloxifene (EVISTA) 60 mg tablet Take 60 mg by mouth daily.  CYANOCOBALAMIN (VITAMIN B-12 IJ) by Injection route every fourteen (14) days. SQ      pregabalin (LYRICA) 100 mg capsule Take 1 Cap by mouth two (2) times a day. Max Daily Amount: 200 mg. Indications: Diabetic Peripheral Neuropathy 60 Cap 1    diazePAM (VALIUM) 5 mg tablet Sign MRI consent first.  Then take 1 tab, 20 minutes before MRI test.  For anxiety-claustrophobia. Need  to and from MRI. 1 Tab 0    predniSONE (DELTASONE) 10 mg tablet Take in AM with food. Day 1: 6 tabs   Day 2: 5 tabs   Day 3: 4 tabs    Day 4: 3 tabs    Day 5: 2 tabs   Day 6: 1 tab. Then stop 21 Tab 0    acetaminophen (TYLENOL) 500 mg tablet Take 1,000 mg by mouth three (3) times daily as needed for Pain.  rivaroxaban (XARELTO) 10 mg tablet Take 1 Tab by mouth daily (with lunch). Indications: KNEE REPLACEMENT DEEP VEIN THROMBOSIS PREVENTION 14 Tab 0    senna-docusate (PERICOLACE) 8.6-50 mg per tablet Take 1 Tab by mouth two (2) times a day. Indications: constipation 60 Tab 0    oxyCODONE IR (ROXICODONE) 5 mg immediate release tablet Take 1 Tab by mouth every six (6) hours as needed. Max Daily Amount: 20 mg. Indications: Pain 50 Tab 0    losartan (COZAAR) 50 mg tablet Take 1 Tab by mouth daily. 90 Tab 3     No current facility-administered medications on file prior to visit.         Allergies   Allergen Reactions    Lactose Diarrhea and Nausea Only    Sulfa (Sulfonamide Antibiotics) Hives        Family History   Problem Relation Age of Onset    Kidney Disease Mother     Cancer Mother      ovarian, esophageal cancer    Heart Disease Father     Coronary Artery Disease Brother     No Known Problems Daughter     No Known Problems Daughter     No Known Problems Daughter     No Known Problems Daughter     Heart Attack Paternal Uncle      MI IN 40S    Heart Attack Paternal Grandmother      MI IN 40S    Anesth Problems Neg Hx            Review of Symptoms:  Pertinent Positive: palpitations  Pertinent Negative: no syncope, near syncope  All Other systems reviewed and are negative for a Comprehensive ROS (10+)    Physical Exam    Blood pressure 134/78, pulse 79, resp. rate 16, height 5' 4\" (1.626 m), weight 171 lb 3.2 oz (77.7 kg), SpO2 100 %. Constitutional:  well-developed and well-nourished. No distress. HENT: Normocephalic. Eyes: No scleral icterus. Neck:  Neck supple. No JVD present. Pulmonary/Chest: Effort normal and breath sounds normal. No respiratory distress, wheezes or rales. Cardiovascular: Normal rate, regular rhythm, S1 S2 . Exam reveals no gallop and no friction rub. No murmur heard. No edema. Extremities:  Normal muscle tone  Abdominal:   No abnormal distension. Neurological:  Moving all extremities, cranial nerves appear grossly intact. Skin: Skin is not cold. Not diaphoretic. No erythema. Psychiatric:  Grossly normal mood and affect. Intact insight.     Objective Data:    ECG: personally reviewed and  intrepreted  Normal sinus rhtyhm

## 2018-09-11 ENCOUNTER — TELEPHONE (OUTPATIENT)
Dept: CARDIOLOGY CLINIC | Age: 68
End: 2018-09-11

## 2018-09-11 NOTE — TELEPHONE ENCOUNTER
Whitney Muñoz with [facility] called, Patient is due to have a procedure with [facility] 10/25 and they need a cardiac clearance they also need to know how long she may hold her blood thinner.    Phone: [phone]

## 2018-09-18 ENCOUNTER — CLINICAL SUPPORT (OUTPATIENT)
Dept: CARDIOLOGY CLINIC | Age: 68
End: 2018-09-18

## 2018-09-18 DIAGNOSIS — R00.2 PALPITATIONS: Primary | ICD-10-CM

## 2018-09-26 ENCOUNTER — TELEPHONE (OUTPATIENT)
Dept: CARDIOLOGY CLINIC | Age: 68
End: 2018-09-26

## 2018-09-26 NOTE — TELEPHONE ENCOUNTER
Called patient ID verified using two patient identifiers. Informed patient of a normal 48 hour Holter monitor results including 14 PVCs per Dr. Geovany Watson. Patient verbalized understanding.

## 2018-10-11 ENCOUNTER — TELEPHONE (OUTPATIENT)
Dept: CARDIOLOGY CLINIC | Age: 68
End: 2018-10-11

## 2018-10-11 NOTE — TELEPHONE ENCOUNTER
Vilma Titus calling from [facility] to get surgical clearance for this pt sent back to their office eithr today or by tomorrow, the latest. Pt is scheduled for her procedure on 10/19. I confirmed that the fax was received. Vilma Titus can be reached @ [phone] ext. [phone].      Thanks

## 2018-10-12 NOTE — TELEPHONE ENCOUNTER
Ron Hitchcock from [facility] called following up on surgical clearance.   Ron Hitchcock states the office closes at [facility].  Phone # [phone]  Thanks

## 2018-10-12 NOTE — TELEPHONE ENCOUNTER
Pt seen by Dr. Randy Cherry 9/10/18. Risk stratification letter written shortly after. Letter faxed today. [facility] and LM with [facility]. Also inquired about best fax number to reach [place].

## 2018-10-24 ENCOUNTER — OFFICE VISIT (OUTPATIENT)
Dept: CARDIOLOGY CLINIC | Age: 68
End: 2018-10-24

## 2018-10-24 VITALS
DIASTOLIC BLOOD PRESSURE: 60 MMHG | SYSTOLIC BLOOD PRESSURE: 140 MMHG | HEART RATE: 78 BPM | OXYGEN SATURATION: 97 % | HEIGHT: 64 IN | RESPIRATION RATE: 20 BRPM | WEIGHT: 170 LBS | BODY MASS INDEX: 29.02 KG/M2

## 2018-10-24 DIAGNOSIS — I10 ESSENTIAL HYPERTENSION: ICD-10-CM

## 2018-10-24 DIAGNOSIS — E78.5 DYSLIPIDEMIA: ICD-10-CM

## 2018-10-24 DIAGNOSIS — I49.3 PVC (PREMATURE VENTRICULAR CONTRACTION): Primary | ICD-10-CM

## 2018-10-24 NOTE — PROGRESS NOTES
Visit Vitals  /60   Pulse 78   Resp 20   Ht 5' 4\" (1.626 m)   Wt 170 lb (77.1 kg)   SpO2 97%   BMI 29.18 kg/m²      Surgery tomorrow for bladder lifted.

## 2018-10-24 NOTE — PATIENT INSTRUCTIONS
Please have labs drawn in the next few weeks. Monitor home blood pressures 3-4x weekly; call if >140/90 consistently. Follow-up in 6 months.

## 2018-10-24 NOTE — PROGRESS NOTES
Lucius Yan, [facility]  [address]  [place], [address] [facility]    Office [phone]  Fax [phone]        Zeus Schafer is a 76 y.o. female who is followed outpatient by Dr. Clary Reilly and was last seen September 10, 2018 by Dr. Ranjan Duenas. Patient is here today for follow-up of palpitations. Assessment  Encounter Diagnoses   Name Primary?  PVC (premature ventricular contraction) Yes    Essential hypertension     Dyslipidemia        Recommendations:  Palpitations  - hx of PVC/NSVT  - previously on mexilitine, off sine 1/2018. Started having palpitations about a month ago which were ongoing about 2 weeks intermittently;  - symptoms have self resolved since last office visit. - holter monitor 9/2018 without sig arrhythmia; 14 PVCs captured  - may need prolonged event monitor if symptoms return    HTN  - mildly elevated in office today - per pt usually nml - systolic 522W  - pt states she is anxious; bladder lift surgery tmw  - monitor at home 3-4x weekly, call if pressures continue to be elevated  - cont metoprolol and losartan    Dyslipidemia  - no recent labs; given slip for lipids and hepatic - will obtain in next 2-3 weeks  - cont atorvastatin and fenofibrate    CV Risk / prevention  - decrease aspirin to 81mg daily    Hx of non ischemic cardiomyopathy   -resolved, nml EF last Echo, no s/s HF     Follow-up Disposition:  Return in about 6 months (around 4/24/2019), or if symptoms worsen or fail to improve. Subjective:  77 yo female with hx of nsvt/pvc previously on mexilitine therapy. Was d/c 1/2018. Did well without any palpitations until about a month ago. Started having palpitations every other day. No syncope, near syncope. No chest pain, sob. Symptoms eased off since last visit 2 weeks ago. Pt today states she is doing well and is without complaint. Has bladder lift surgery planned for tmw and is feeling anxious.      Cardiac testing  Echo 7/27/07- Ef 40-45%, mild MR, mild TR  Echo 2/1/11- Ef 50%, mild MR, Mild TR  Echo 10/10/12- EF 50-55%, mild LAE, no significant changes when compared to previous study  Echo 10/19/13-EF 55 %,grade 1 diastolic dysfunction, mild LAE  LE artery duplex 9/2/14- Negative for DVT  Echo 11/21/14-EF 55 %, grade 1 diastolic dysfunction, mild LAE, mild MR, mild TR  Echo- 10/3/16: EF 60-65%, mild TR. PASP 29 mmHg. Past Medical History:   Diagnosis Date    Autonomic dysfunction 12/4/98    tilt test with marked heart rate variablility and drop in BP without hemodynamic collapse    Cancer ([facility] [address] 1997    left breast lumpectomy , Rx RT    Diabetes ([facility] [address]     pre-diabetic, on metformin    Dilated cardiomyopathy ([facility] [address] 2/8/06    nonischemic    Elevated triglycerides with high cholesterol 11/21/2014    Essential hypertension 2/9/2017    Hx MRSA infection 3/26/2018    Karol Mendoza Hypothyroidism 1994    Dr. Rubens Starr.    Ill-defined condition     high cholesterol    Insulin resistance 11/21/2014    Mixed hyperlipidemia with apolipoprotein E2 variant 11/21/2014    Nausea & vomiting     Osteoarthritis     diffuse. Dr. Noris Little Other and unspecified hyperlipidemia 12/21/2010    PONV (postoperative nausea and vomiting)     Psychiatric disorder     anxiety    PVC's 1994    Sleep apnea     Not wearing CPAP    Vitamin D deficiency 11/21/2014        Current Outpatient Medications   Medication Sig Dispense Refill    sertraline (ZOLOFT) 100 mg tablet Take 100 mg by mouth daily.  fenofibrate nanocrystallized (TRICOR) 145 mg tablet Take 145 mg by mouth daily. Nightly      metoprolol succinate (TOPROL-XL) 25 mg XL tablet Take 1 Tab by mouth daily. 90 Tab 3    metFORMIN (GLUCOPHAGE) 1,000 mg tablet Take 1 Tab by mouth two (2) times daily (with meals). 180 Tab 3    losartan (COZAAR) 50 mg tablet Take 1 Tab by mouth daily. 90 Tab 3    fenofibrate nanocrystallized (TRICOR) 145 mg tablet Take 1 Tab by mouth daily.  (Patient taking differently: Take 145 mg by mouth nightly.) 90 Tab 3    atorvastatin (LIPITOR) 10 mg tablet TAKE 1 TABLET NIGHTLY (Patient taking differently: Take 10 mg by mouth daily. TAKE 1 TABLET NIGHTLY) 90 Tab 3    aspirin delayed-release 81 mg tablet Take 162 mg by mouth nightly.  levothyroxine (SYNTHROID) 137 mcg tablet Take 137 mcg by mouth Daily (before breakfast).  cholecalciferol, VITAMIN D3, (VITAMIN D3) 5,000 unit tab tablet Take 5,000 Units by mouth daily.  raloxifene (EVISTA) 60 mg tablet Take 60 mg by mouth daily.  CYANOCOBALAMIN (VITAMIN B-12 IJ) by Injection route every fourteen (14) days. SQ         Allergies   Allergen Reactions    Lactose Diarrhea and Nausea Only    Sulfa (Sulfonamide Antibiotics) Hives          Review of Systems  Constitutional: Negative for fever, chills, malaise/fatigue and diaphoresis. Respiratory: Negative for cough, hemoptysis, sputum production, shortness of breath and wheezing. Cardiovascular: Negative for chest pain, palpitations, orthopnea, claudication, leg swelling and PND. Gastrointestinal: Negative for heartburn, nausea, vomiting, blood in stool and melena. Skin: Negative for rash. Neurological: Negative for focal weakness, seizures, loss of consciousness, weakness and headaches. Endo/Heme/Allergies: Negative for abnormal bleeding   psychiatric/Behavioral: Negative for memory loss. The patient does not have insomnia.       Physical Exam    Visit Vitals  /60   Pulse 78   Resp 20   Ht 5' 4\" (1.626 m)   Wt 170 lb (77.1 kg)   SpO2 97%   BMI 29.18 kg/m²     Wt Readings from Last 3 Encounters:   10/24/18 170 lb (77.1 kg)   09/10/18 171 lb 3.2 oz (77.7 kg)   09/06/18 170 lb (77.1 kg)      General - well developed well nourished  Neck - JVP normal,   Cardiac - normal S1, S2, RRR no murmurs,   Vascular - radials and pedal pulses equal bilateral  Lungs - clear to auscultation bilaterals, no rales, wheezing or rhonchi  Abd - soft nontender,non distended, +BS  Extremities - no edema, warm, well perfused  Skin - no rash  Neuro - nonfocal  Psych - normal mood and affect      Cardiographics  Lab Results   Component Value Date/Time    Hemoglobin A1c 5.4 03/26/2018 11:47 AM     Lab Results   Component Value Date/Time    Cholesterol, total 178 03/03/2016 03:16 AM    Cholesterol, total 167 09/09/2015 10:07 AM    Cholesterol, total 203 (H) 07/10/2015 08:57 AM    Cholesterol, Total 170 02/01/2017 09:37 AM    Cholesterol, Total 139 08/02/2016 08:07 AM    HDL Cholesterol 48 (L) 03/03/2016 03:16 AM    HDL Cholesterol 41 09/09/2015 10:07 AM    HDL Cholesterol 42 (L) 07/10/2015 08:57 AM    LDL, calculated 104 (H) 03/03/2016 03:16 AM    LDL, calculated 87 09/09/2015 10:07 AM    LDL, calculated 111 (H) 07/10/2015 08:57 AM    Triglyceride 201 (H) 03/03/2016 03:16 AM    Triglyceride 194 (H) 09/09/2015 10:07 AM    Triglyceride 318 (H) 07/10/2015 08:57 AM     Component      Latest Ref Rng & Units 4/10/2018 3/26/2018 2/7/2018           4:36 AM 11:47 AM  7:54 AM   Sodium      136 - 145 mmol/L 140 140 141   Potassium      3.5 - 5.1 mmol/L 4.0 4.1 4.4   Chloride      97 - 108 mmol/L 111 (H) 106 102   CO2      21 - 32 mmol/L 22 25 22   Anion gap      5 - 15 mmol/L 7 9    Glucose      65 - 100 mg/dL 138 (H) 70 86   BUN      6 - 20 MG/DL 11 14 16   Creatinine      0.55 - 1.02 MG/DL 0.47 (L) 0.53 (L) 0.57   BUN/Creatinine ratio      12 - 20   23 (H) 26 (H) 28   GFR est AA      >60 ml/min/1.73m2 >60 >60 111   GFR est non-AA      >60 ml/min/1.73m2 >60 >60 96   Calcium      8.5 - 10.1 MG/DL 8.2 (L) 9.3 9.7     Loistine , ANP

## 2018-10-29 ENCOUNTER — TELEPHONE (OUTPATIENT)
Dept: CARDIOLOGY CLINIC | Age: 68
End: 2018-10-29

## 2018-10-29 RX ORDER — LOSARTAN POTASSIUM 50 MG/1
50 TABLET ORAL DAILY
Qty: 90 TAB | Refills: 3 | Status: SHIPPED | OUTPATIENT
Start: 2018-10-29 | End: 2019-02-23 | Stop reason: SDUPTHER

## 2018-10-29 NOTE — TELEPHONE ENCOUNTER
Pt is calling with a question about her medication, losartan. Pt can be reached @ [phone].      Thanks

## 2018-10-31 NOTE — TELEPHONE ENCOUNTER
Returned patient's call she wants to make sure she should be taking Losartan 50 mg daily. She stated she was in the hospital last week for TVT bladder surgery and was told by the physicians she should not be taking this medication. Patient states she has never had any problems with her BP. Please advise.

## 2018-11-02 NOTE — TELEPHONE ENCOUNTER
Called patient advised per NP BODØ from the data we have Losartan should be fine for patient to continue.  She had recent labs which revealed stable renal (kidney) function.  BP was OK in office. She can continue for now unless urology gave her other information. Patient stated she will continue taking as prescribed.

## 2018-12-05 LAB
ALBUMIN SERPL-MCNC: 4.7 G/DL (ref 3.6–4.8)
ALP SERPL-CCNC: 42 IU/L (ref 39–117)
ALT SERPL-CCNC: 8 IU/L (ref 0–32)
AST SERPL-CCNC: 17 IU/L (ref 0–40)
BILIRUB DIRECT SERPL-MCNC: 0.12 MG/DL (ref 0–0.4)
BILIRUB SERPL-MCNC: 0.3 MG/DL (ref 0–1.2)
CHOLEST SERPL-MCNC: 180 MG/DL (ref 100–199)
HDLC SERPL-MCNC: 42 MG/DL
INTERPRETATION, 910389: NORMAL
LDLC SERPL CALC-MCNC: 102 MG/DL (ref 0–99)
PROT SERPL-MCNC: 7.4 G/DL (ref 6–8.5)
TRIGL SERPL-MCNC: 180 MG/DL (ref 0–149)
VLDLC SERPL CALC-MCNC: 36 MG/DL (ref 5–40)

## 2018-12-12 ENCOUNTER — TELEPHONE (OUTPATIENT)
Dept: CARDIOLOGY CLINIC | Age: 68
End: 2018-12-12

## 2018-12-12 DIAGNOSIS — R25.2 LEG CRAMPS: Primary | ICD-10-CM

## 2018-12-12 NOTE — TELEPHONE ENCOUNTER
Pt reports sev leg cramps. Symptoms occur during the day but are much worse at night. Discussed w/ pt via phone; see 'my chart' for details. Checking BMP and CK level. Pt will trial CoQ-10 200mg daily.

## 2019-01-09 LAB
BUN SERPL-MCNC: 15 MG/DL (ref 8–27)
BUN/CREAT SERPL: 31 (ref 12–28)
CALCIUM SERPL-MCNC: 9.5 MG/DL (ref 8.7–10.3)
CHLORIDE SERPL-SCNC: 105 MMOL/L (ref 96–106)
CK SERPL-CCNC: 130 U/L (ref 24–173)
CO2 SERPL-SCNC: 18 MMOL/L (ref 20–29)
CREAT SERPL-MCNC: 0.49 MG/DL (ref 0.57–1)
GLUCOSE SERPL-MCNC: 73 MG/DL (ref 65–99)
POTASSIUM SERPL-SCNC: 4.7 MMOL/L (ref 3.5–5.2)
SODIUM SERPL-SCNC: 142 MMOL/L (ref 134–144)

## 2019-01-09 NOTE — TELEPHONE ENCOUNTER
Reviewed with pt via phone. Labs overall stable with baseline. BUN slightly elevated; does not explain leg cramps. Pt just started CoQ-10 about a week ago; continues to have cramps. Will continue to try this for another week. However, If cramps still not improving will trial break from statin therapy; pt will call us to discuss.

## 2019-02-12 ENCOUNTER — TELEPHONE (OUTPATIENT)
Dept: CARDIOLOGY CLINIC | Age: 69
End: 2019-02-12

## 2019-02-12 NOTE — TELEPHONE ENCOUNTER
Called patient in regards to the below message via my chart. Patient informed me she has not taken the Lipitor nor the Tricor in the last week and is going to wait till her follow up visit in 3/2019 to decide if she will restart it or not. I am still having leg cramps and also my arms hurt most of the time.  I think I am going to stop taking the cholesterol med's and see if that helps

## 2019-02-23 DIAGNOSIS — I42.8 OTHER CARDIOMYOPATHY (HCC): ICD-10-CM

## 2019-02-23 DIAGNOSIS — I49.3 PVC (PREMATURE VENTRICULAR CONTRACTION): ICD-10-CM

## 2019-02-26 RX ORDER — LOSARTAN POTASSIUM 50 MG/1
50 TABLET ORAL DAILY
Qty: 90 TAB | Refills: 0 | Status: SHIPPED | OUTPATIENT
Start: 2019-02-26 | End: 2019-10-14 | Stop reason: SDUPTHER

## 2019-02-26 RX ORDER — METOPROLOL SUCCINATE 25 MG/1
25 TABLET, EXTENDED RELEASE ORAL DAILY
Qty: 90 TAB | Refills: 0 | Status: SHIPPED | OUTPATIENT
Start: 2019-02-26 | End: 2019-07-29 | Stop reason: SDUPTHER

## 2019-02-26 RX ORDER — METFORMIN HYDROCHLORIDE 1000 MG/1
1000 TABLET ORAL 2 TIMES DAILY WITH MEALS
Qty: 180 TAB | Refills: 3 | OUTPATIENT
Start: 2019-02-26

## 2019-03-28 ENCOUNTER — OFFICE VISIT (OUTPATIENT)
Dept: CARDIOLOGY CLINIC | Age: 69
End: 2019-03-28

## 2019-03-28 ENCOUNTER — TELEPHONE (OUTPATIENT)
Dept: CARDIOLOGY CLINIC | Age: 69
End: 2019-03-28

## 2019-03-28 VITALS
RESPIRATION RATE: 18 BRPM | HEIGHT: 64 IN | BODY MASS INDEX: 29.06 KG/M2 | HEART RATE: 80 BPM | WEIGHT: 170.2 LBS | DIASTOLIC BLOOD PRESSURE: 80 MMHG | SYSTOLIC BLOOD PRESSURE: 132 MMHG

## 2019-03-28 DIAGNOSIS — I10 ESSENTIAL HYPERTENSION: Primary | ICD-10-CM

## 2019-03-28 DIAGNOSIS — M79.89 LEG SWELLING: ICD-10-CM

## 2019-03-28 DIAGNOSIS — I49.3 PVC (PREMATURE VENTRICULAR CONTRACTION): ICD-10-CM

## 2019-03-28 DIAGNOSIS — E78.5 DYSLIPIDEMIA: ICD-10-CM

## 2019-03-28 DIAGNOSIS — R25.2 CRAMPS OF LOWER EXTREMITY: ICD-10-CM

## 2019-03-28 DIAGNOSIS — E78.2 ELEVATED TRIGLYCERIDES WITH HIGH CHOLESTEROL: ICD-10-CM

## 2019-03-28 RX ORDER — ATORVASTATIN CALCIUM 10 MG/1
TABLET, FILM COATED ORAL
Qty: 90 TAB | Refills: 0 | Status: SHIPPED | OUTPATIENT
Start: 2019-03-28 | End: 2021-12-08

## 2019-03-28 RX ORDER — HYDROCHLOROTHIAZIDE 12.5 MG/1
12.5 TABLET ORAL DAILY
Qty: 30 TAB | Refills: 0 | Status: SHIPPED | OUTPATIENT
Start: 2019-03-28 | End: 2019-04-22 | Stop reason: SDUPTHER

## 2019-03-28 RX ORDER — FENOFIBRATE 145 MG/1
145 TABLET, COATED ORAL DAILY
Qty: 90 TAB | Refills: 0 | Status: SHIPPED | OUTPATIENT
Start: 2019-03-28 | End: 2019-06-13 | Stop reason: SDUPTHER

## 2019-03-28 NOTE — PROGRESS NOTES
Kourtney Hong, [facility]  [address]  [place], [address] [facility]    Office [phone]  Fax [phone]        Imtiaz Flores is a 71 y.o. female who is followed outpatient by Dr. Mary Galvan and was last seen by me 10/24/18. Patient is here today for follow-up of muscle cramps as well as LE swelling. Assessment  Encounter Diagnoses   Name Primary?  Essential hypertension Yes    Dyslipidemia     Elevated triglycerides with high cholesterol     PVC (premature ventricular contraction)     Leg swelling     Cramps of lower extremity        Recommendations:  HTN  - mildly elevated - 130s/70s-80s  - start HCTZ 12.5mg daily; hx of orthostasis, will monitor  - cont metoprolol and losartan    Dyslipidemia  - off atorvastatin and fenofibrate since Jan 2/2 muscle cramps  - restart meds as cramps unchanged   - discussed diet / exercise    Hx of non ischemic cardiomyopathy   -resolved, nml EF last Echo, no s/s HF    Muscle Cramps  - good hydration; BMP and CK level prev at baseline  - no improvement off statin and fibrate  no new meds since onset   - timing of cramps does not correlate with extremity swelling  - add'll eval rec with PCP: noted to have hx of hypothyroid as well as vit D def     Leg Swelling  - no swelling on PE; weight and breathing stable  - recommend low salt diet with leg elevation; compression hose PRN  - pt persistent on fluid pill; unable to wear shoes last week and feels is a persistent problem; will trial low dose HCTZ     Hx of Palpitations  - hx of PVC/NSVT  - previously on mexilitine, off sine 1/2018.    - holter monitor 9/2018 without sig arrhythmia; 14 PVCs captured  - no c/o of symptoms today     f/u in 4 weeks or PRN. Subjective:  72 yo female with hx of nsvt/pvc previously on mexilitine therapy. Was d/c 1/2018. Here with c/o of extremity swelling as well as severe cramps. Swelling mostly in LE; was unable to wear shoes last week.   States skin feels tight; is worse throughout the day but doesn't completely resolve overnight. Also has severe cramps arms and legs; has been ongoing since Jan.  Stopped cholesterol meds x2-3 months without any improvement. Hasn't had recent thyroid check but does follow with endocrine. Patient denies any exertional chest pain, dyspnea, palpitations, syncope, orthopnea, edema, or paroxysmal nocturnal dyspnea. Timing of cramps is daily; does not correlate with days of increased swelling. Drinks 6-8 large glasses of water daily. Walks 3-4x per week for about 40min. Doesn't routinely check BP; typically 130s/70s-80s. Cardiac testing  Lipids 10/24/18 - , HDL 42, , , VLDL 36    Cardiac Testing/ Procedures:     A. Cardiac Cath/PCI:     B. ECHO/ISAAC: 11/8/17 - EF 55-60%/Grade 1 DD  10/5/16-EF 53-81%, grade 1 diastolic dysfunction, mild TR  11/21/14Left ventricle: Systolic function was normal. Ejection fraction was  estimated to be 55 %. There were no regional wall motion abnormalities. Doppler parameters were consistent with abnormal left ventricular  relaxation (grade 1 diastolic dysfunction). Left atrium: The atrium was mildly dilated. Mitral valve: There was mild regurgitation. Tricuspid valve: There was mild regurgitation.      10/2013 - EF 55%     7/2007 - EF 45%     C. StressNuclear/Stress ECHO/Stress test: 11/8/17 - Treadmill test - 4.39 min/nml  04/99 - Persantine cardiolite - EF 48%/No inducible ischemia  D. Vascular:     E. EP: Hx on nonsustained VT/PVC- on mexilitine  12/4/98 - tilt test with marked heart rate variablility and drop in BP without hemodynamic collapse     8/9/16 - Holter - One blocked P wave ( 4.50 AM) ; no PV; SR  2/11/18 to 3/12/18E cardio event monitor-sinus rhythm/one PVC     F.  Miscellaneous:          Echo 7/27/07- Ef 40-45%, mild MR, mild TR  Echo 2/1/11- Ef 50%, mild MR, Mild TR  Echo 10/10/12- EF 50-55%, mild LAE, no significant changes when compared to previous study  Echo 10/19/13-EF 55 %,grade 1 diastolic dysfunction, mild LAE  LE artery duplex 9/2/14- Negative for DVT  Echo 11/21/14-EF 55 %, grade 1 diastolic dysfunction, mild LAE, mild MR, mild TR  Echo- 10/3/16: EF 60-65%, mild TR. PASP 29 mmHg. Past Medical History:   Diagnosis Date    Autonomic dysfunction 12/4/98    tilt test with marked heart rate variablility and drop in BP without hemodynamic collapse    Cancer ([facility] [address] 1997    left breast lumpectomy , Rx RT    Diabetes ([facility] [address]     pre-diabetic, on metformin    Dilated cardiomyopathy ([facility] 2/8/06    nonischemic    Elevated triglycerides with high cholesterol 11/21/2014    Essential hypertension 2/9/2017    Hx MRSA infection 3/26/2018    Elane Smiley Hypothyroidism 1994    Dr. Arcelia Murphy.    Ill-defined condition     high cholesterol    Insulin resistance 11/21/2014    Mixed hyperlipidemia with apolipoprotein E2 variant 11/21/2014    Nausea & vomiting     Osteoarthritis     diffuse. Dr. Chana Nixon Other and unspecified hyperlipidemia 12/21/2010    PONV (postoperative nausea and vomiting)     Psychiatric disorder     anxiety    PVC's 1994    Sleep apnea     Not wearing CPAP    Vitamin D deficiency 11/21/2014        Current Outpatient Medications   Medication Sig Dispense Refill    hydroCHLOROthiazide (HYDRODIURIL) 12.5 mg tablet Take 1 Tab by mouth daily. 30 Tab 0    atorvastatin (LIPITOR) 10 mg tablet TAKE 1 TABLET NIGHTLY 90 Tab 0    fenofibrate nanocrystallized (TRICOR) 145 mg tablet Take 1 Tab by mouth daily. 90 Tab 0    metoprolol succinate (TOPROL-XL) 25 mg XL tablet Take 1 Tab by mouth daily. 90 Tab 0    losartan (COZAAR) 50 mg tablet Take 1 Tab by mouth daily. 90 Tab 0    sertraline (ZOLOFT) 100 mg tablet Take 100 mg by mouth daily.  metFORMIN (GLUCOPHAGE) 1,000 mg tablet Take 1 Tab by mouth two (2) times daily (with meals). 180 Tab 3    aspirin delayed-release 81 mg tablet Take 162 mg by mouth nightly.       levothyroxine (SYNTHROID) 137 mcg tablet Take 137 mcg by mouth Daily (before breakfast).  cholecalciferol, VITAMIN D3, (VITAMIN D3) 5,000 unit tab tablet Take 5,000 Units by mouth daily.  raloxifene (EVISTA) 60 mg tablet Take 60 mg by mouth daily.  CYANOCOBALAMIN (VITAMIN B-12 IJ) by Injection route every fourteen (14) days. SQ         Allergies   Allergen Reactions    Lactose Diarrhea and Nausea Only    Sulfa (Sulfonamide Antibiotics) Hives          Review of Systems  Constitutional: Negative for fever, chills, malaise/fatigue and diaphoresis. Respiratory: Negative for cough, hemoptysis, sputum production, shortness of breath and wheezing. Cardiovascular: Negative for chest pain, palpitations, orthopnea, claudication, and PND. +leg swelling  Gastrointestinal: Negative for heartburn, nausea, vomiting, blood in stool and melena. Skin: Negative for rash. Neurological: Negative for focal weakness, seizures, loss of consciousness, weakness and headaches. Endo/Heme/Allergies: Negative for abnormal bleeding   psychiatric/Behavioral: Negative for memory loss. The patient does not have insomnia.       Physical Exam    Visit Vitals  /80 (BP 1 Location: Right arm)   Pulse 80   Resp 18   Ht 5' 4\" (1.626 m)   Wt 170 lb 3.2 oz (77.2 kg)   BMI 29.21 kg/m²     Wt Readings from Last 3 Encounters:   03/28/19 170 lb 3.2 oz (77.2 kg)   10/24/18 170 lb (77.1 kg)   09/10/18 171 lb 3.2 oz (77.7 kg)      General - well developed well nourished  Neck - no JVD  Cardiac - normal S1, S2, RRR no murmurs,   Vascular - radials and pedal pulses equal bilateral  Lungs - clear to auscultation bilaterals, no rales, wheezing or rhonchi  Abd - soft nontender,non distended, +BS  Extremities - no edema, warm, well perfused  Skin - no rash  Neuro - nonfocal  Psych - normal mood and affect    Cardiographics:    Lab Results   Component Value Date/Time    Hemoglobin A1c 5.4 03/26/2018 11:47 AM     Component      Latest Ref Rng & Units 1/8/2019 1/8/2019 12/4/2018 12/4/2018           9:50 AM  9:50 AM  9:37 AM  9:37 AM   Glucose      65 - 99 mg/dL  73     BUN      8 - 27 mg/dL  15     Creatinine      0.57 - 1.00 mg/dL  0.49 (L)     GFR est non-AA      >59 mL/min/1.73  100     GFR est AA      >59 mL/min/1.73  116     BUN/Creatinine ratio      12 - 28  31 (H)     Sodium      134 - 144 mmol/L  142     Potassium      3.5 - 5.2 mmol/L  4.7     Chloride      96 - 106 mmol/L  105     CO2      20 - 29 mmol/L  18 (L)     Calcium      8.7 - 10.3 mg/dL  9.5     Protein, total      6.0 - 8.5 g/dL   7.4    Albumin      3.6 - 4.8 g/dL   4.7    Bilirubin, total      0.0 - 1.2 mg/dL   0.3    Bilirubin, direct      0.00 - 0.40 mg/dL   0.12    Alk.  phosphatase      39 - 117 IU/L   42    AST      0 - 40 IU/L   17    ALT (SGPT)      0 - 32 IU/L   8    Cholesterol, total      100 - 199 mg/dL    180   Triglyceride      0 - 149 mg/dL    180 (H)   HDL Cholesterol      >39 mg/dL    42   VLDL, calculated      5 - 40 mg/dL    36   LDL, calculated      0 - 99 mg/dL    102 (H)   Creatine Kinase,Total      24 - 173 U/L 130        Kyle Jackson, ANP

## 2019-03-28 NOTE — TELEPHONE ENCOUNTER
Patient states she is calling because Np Astrid Brock told her she would find out where NP Sami sEposito works.     Phone: [phone]

## 2019-03-28 NOTE — PROGRESS NOTES
Visit Vitals  /80 (BP 1 Location: Right arm)   Pulse 80   Resp 18   Ht 5' 4\" (1.626 m)   Wt 170 lb 3.2 oz (77.2 kg)   BMI 29.21 kg/m²       Patient is here for follow up. C/o swelling in her feet, legs and hands.

## 2019-03-28 NOTE — PATIENT INSTRUCTIONS
I will start you on hydrochlorithizaide 1 tab daily. Please restart your cholesterol meds; I have sent prescriptions to your pharmacy. Follow-up in 4 weeks with repeat labs prior.

## 2019-04-01 NOTE — TELEPHONE ENCOUNTER
Called patient reviewed below message per LUIS Lima. Patient verbalized understanding.     I believe she is now working at [employer].  I'm not sure of her office location; pt can call [phone] to ask further about how best to schedule.  If this is the wrong number, they should be able to direct her.

## 2019-04-23 RX ORDER — HYDROCHLOROTHIAZIDE 12.5 MG/1
TABLET ORAL
Qty: 30 TAB | Refills: 0 | Status: SHIPPED | OUTPATIENT
Start: 2019-04-23 | End: 2019-05-17 | Stop reason: SDUPTHER

## 2019-04-25 ENCOUNTER — OFFICE VISIT (OUTPATIENT)
Dept: CARDIOLOGY CLINIC | Age: 69
End: 2019-04-25

## 2019-04-25 VITALS
DIASTOLIC BLOOD PRESSURE: 80 MMHG | WEIGHT: 173.8 LBS | BODY MASS INDEX: 29.67 KG/M2 | RESPIRATION RATE: 16 BRPM | HEART RATE: 85 BPM | OXYGEN SATURATION: 96 % | SYSTOLIC BLOOD PRESSURE: 128 MMHG | HEIGHT: 64 IN

## 2019-04-25 DIAGNOSIS — Z86.79 HX OF CARDIOMYOPATHY: ICD-10-CM

## 2019-04-25 DIAGNOSIS — I10 ESSENTIAL HYPERTENSION: Primary | ICD-10-CM

## 2019-04-25 DIAGNOSIS — E78.5 DYSLIPIDEMIA: ICD-10-CM

## 2019-04-25 NOTE — PROGRESS NOTES
Blank Romero is a 71 y.o. female    Chief Complaint   Patient presents with    Follow-up     1 mo f/u    Hypertension    Cholesterol Problem       1. Have you been to the ER, urgent care clinic since your last visit? Hospitalized since your last visit? NO    2. Have you seen or consulted any other health care providers outside of the [address] [place] [address] since your last visit? Include any pap smears or colon screening.   NO    Visit Vitals  /80 (BP 1 Location: Left arm, BP Patient Position: Sitting)   Pulse 85   Resp 16   Ht 5' 4\" (1.626 m)   Wt 173 lb 12.8 oz (78.8 kg)   SpO2 96%   BMI 29.83 kg/m²

## 2019-04-25 NOTE — PROGRESS NOTES
Jailyn Smallwood, [facility]  [address]  [place], [address] [facility]    Office [phone]  Fax [phone]        Christal Deal is a 71 y.o. female who is followed outpatient by Dr. Geovany Watson and was last seen by me 3/28/19. Patient is here today for follow-up of PATTI and HTN. Assessment  Encounter Diagnoses   Name Primary?  Essential hypertension Yes    Dyslipidemia     Hx of cardiomyopathy      Recommendations:  HTN  - well controlled on current meds - no changes today    Dyslipidemia  - cont atorvastatin and fenofibrate   - discussed diet and regular exercise    Hx of non ischemic cardiomyopathy   -resolved, nml EF last Echo, no s/s HF    Hx of Palpitations  - hx of PVC/NSVT  - previously on mexilitine, off sine 1/2018.    - holter monitor 9/2018 without sig arrhythmia; 14 PVCs captured  - no c/o of symptoms today     f/u in 6 months or PRN. Subjective:  Pt states she is doing well on HCTZ; BP and swelling now improved. Pt without complaints. Muscle cramps improved since last visit as well; was seen by PCP - eval unremarkable. Patient denies any exertional chest pain, dyspnea, palpitations, syncope, orthopnea, edema, or paroxysmal nocturnal dyspnea. Has been working with derm to remove several areas of basal cell carcinoma from her face. Cardiac testing  Lipids 10/24/18 - , HDL 42, , , VLDL 36    Cardiac Testing/ Procedures:     A. Cardiac Cath/PCI:     B. ECHO/ISAAC: 11/8/17 - EF 55-60%/Grade 1 DD  10/5/16-EF 49-71%, grade 1 diastolic dysfunction, mild TR  11/21/14Left ventricle: Systolic function was normal. Ejection fraction was  estimated to be 55 %. There were no regional wall motion abnormalities. Doppler parameters were consistent with abnormal left ventricular  relaxation (grade 1 diastolic dysfunction). Left atrium: The atrium was mildly dilated. Mitral valve: There was mild regurgitation. Tricuspid valve:  There was mild regurgitation.      10/2013 - EF 55%     7/2007 - EF 45%     C. StressNuclear/Stress ECHO/Stress test: 11/8/17 - Treadmill test - 4.39 min/nml  04/99 - Persantine cardiolite - EF 48%/No inducible ischemia  D. Vascular:     E. EP: Hx on nonsustained VT/PVC- on mexilitine  12/4/98 - tilt test with marked heart rate variablility and drop in BP without hemodynamic collapse     8/9/16 - Holter - One blocked P wave ( 4.50 AM) ; no PV; SR  2/11/18 to 3/12/18E cardio event monitor-sinus rhythm/one PVC     F. Miscellaneous:          Echo 7/27/07- Ef 40-45%, mild MR, mild TR  Echo 2/1/11- Ef 50%, mild MR, Mild TR  Echo 10/10/12- EF 50-55%, mild LAE, no significant changes when compared to previous study  Echo 10/19/13-EF 55 %,grade 1 diastolic dysfunction, mild LAE  LE artery duplex 9/2/14- Negative for DVT  Echo 11/21/14-EF 55 %, grade 1 diastolic dysfunction, mild LAE, mild MR, mild TR  Echo- 10/3/16: EF 60-65%, mild TR. PASP 29 mmHg. Past Medical History:   Diagnosis Date    Autonomic dysfunction 12/4/98    tilt test with marked heart rate variablility and drop in BP without hemodynamic collapse    Cancer ([facility] [address] 1997    left breast lumpectomy , Rx RT    Diabetes ([address]     pre-diabetic, on metformin    Dilated cardiomyopathy ([facility] [address] 2/8/06    nonischemic    Elevated triglycerides with high cholesterol 11/21/2014    Essential hypertension 2/9/2017    Hx MRSA infection 3/26/2018    Valery Mayor Hypothyroidism 1994    Dr. Jarad Zhang.    Ill-defined condition     high cholesterol    Insulin resistance 11/21/2014    Mixed hyperlipidemia with apolipoprotein E2 variant 11/21/2014    Nausea & vomiting     Osteoarthritis     diffuse.  Dr. Jose Mcadams Other and unspecified hyperlipidemia 12/21/2010    PONV (postoperative nausea and vomiting)     Psychiatric disorder     anxiety    PVC's 1994    Sleep apnea     Not wearing CPAP    Vitamin D deficiency 11/21/2014        Current Outpatient Medications   Medication Sig Dispense Refill    hydroCHLOROthiazide (HYDRODIURIL) 12.5 mg tablet TAKE 1 TABLET BY MOUTH DAILY 30 Tab 0    atorvastatin (LIPITOR) 10 mg tablet TAKE 1 TABLET NIGHTLY 90 Tab 0    fenofibrate nanocrystallized (TRICOR) 145 mg tablet Take 1 Tab by mouth daily. 90 Tab 0    metoprolol succinate (TOPROL-XL) 25 mg XL tablet Take 1 Tab by mouth daily. 90 Tab 0    losartan (COZAAR) 50 mg tablet Take 1 Tab by mouth daily. 90 Tab 0    sertraline (ZOLOFT) 100 mg tablet Take 100 mg by mouth daily.  metFORMIN (GLUCOPHAGE) 1,000 mg tablet Take 1 Tab by mouth two (2) times daily (with meals). 180 Tab 3    aspirin delayed-release 81 mg tablet Take 162 mg by mouth nightly.  levothyroxine (SYNTHROID) 137 mcg tablet Take 137 mcg by mouth Daily (before breakfast).  cholecalciferol, VITAMIN D3, (VITAMIN D3) 5,000 unit tab tablet Take 5,000 Units by mouth daily.  raloxifene (EVISTA) 60 mg tablet Take 60 mg by mouth daily.  CYANOCOBALAMIN (VITAMIN B-12 IJ) by Injection route every fourteen (14) days. SQ         Allergies   Allergen Reactions    Lactose Diarrhea and Nausea Only    Sulfa (Sulfonamide Antibiotics) Hives      Review of Systems  Constitutional: Negative for fever, chills, malaise/fatigue and diaphoresis. Respiratory: Negative for cough, hemoptysis, sputum production, shortness of breath and wheezing. Cardiovascular: Negative for chest pain, palpitations, orthopnea, claudication, and PND  Gastrointestinal: Negative for heartburn, nausea, vomiting, blood in stool and melena. Skin: Negative for rash. Neurological: Negative for focal weakness, seizures, loss of consciousness, weakness and headaches. Endo/Heme/Allergies: Negative for abnormal bleeding   psychiatric/Behavioral: Negative for memory loss. The patient does not have insomnia.       Physical Exam    Visit Vitals  /80 (BP 1 Location: Left arm, BP Patient Position: Sitting)   Pulse 85   Resp 16   Ht 5' 4\" (1.626 m)   Wt 173 lb 12.8 oz (78.8 kg)   SpO2 96%   BMI 29.83 kg/m²     Wt Readings from Last 3 Encounters:   04/25/19 173 lb 12.8 oz (78.8 kg)   03/28/19 170 lb 3.2 oz (77.2 kg)   10/24/18 170 lb (77.1 kg)      General - well developed well nourished  Neck - no JVD  Cardiac - normal S1, S2, RRR no murmurs,   Vascular - radials and pedal pulses equal bilateral  Lungs - clear to auscultation bilaterals, no rales, wheezing or rhonchi  Abd - soft nontender,non distended, +BS  Extremities - no edema, warm, well perfused  Skin - no rash  Neuro - nonfocal  Psych - normal mood and affect    Labs:  4/18/19: Mg 1.9, BUN 1, Cr 0.55, eGFR 96, Na 144, K 4.2, CO2 22    Lab Results   Component Value Date/Time    Hemoglobin A1c 5.4 03/26/2018 11:47 AM     Component      Latest Ref Rng & Units 1/8/2019 1/8/2019 12/4/2018 12/4/2018           9:50 AM  9:50 AM  9:37 AM  9:37 AM   Glucose      65 - 99 mg/dL  73     BUN      8 - 27 mg/dL  15     Creatinine      0.57 - 1.00 mg/dL  0.49 (L)     GFR est non-AA      >59 mL/min/1.73  100     GFR est AA      >59 mL/min/1.73  116     BUN/Creatinine ratio      12 - 28  31 (H)     Sodium      134 - 144 mmol/L  142     Potassium      3.5 - 5.2 mmol/L  4.7     Chloride      96 - 106 mmol/L  105     CO2      20 - 29 mmol/L  18 (L)     Calcium      8.7 - 10.3 mg/dL  9.5     Protein, total      6.0 - 8.5 g/dL   7.4    Albumin      3.6 - 4.8 g/dL   4.7    Bilirubin, total      0.0 - 1.2 mg/dL   0.3    Bilirubin, direct      0.00 - 0.40 mg/dL   0.12    Alk.  phosphatase      39 - 117 IU/L   42    AST      0 - 40 IU/L   17    ALT (SGPT)      0 - 32 IU/L   8    Cholesterol, total      100 - 199 mg/dL    180   Triglyceride      0 - 149 mg/dL    180 (H)   HDL Cholesterol      >39 mg/dL    42   VLDL, calculated      5 - 40 mg/dL    36   LDL, calculated      0 - 99 mg/dL    102 (H)   Creatine Kinase,Total      24 - 173 U/L 130        Shauna Yang, ANP

## 2019-05-21 RX ORDER — HYDROCHLOROTHIAZIDE 12.5 MG/1
TABLET ORAL
Qty: 30 TAB | Refills: 5 | Status: SHIPPED | OUTPATIENT
Start: 2019-05-21 | End: 2019-08-12

## 2019-05-21 NOTE — TELEPHONE ENCOUNTER
Last OV 4/25/19  Next OV 10/22/19    Requested Prescriptions     Pending Prescriptions Disp Refills    hydroCHLOROthiazide (HYDRODIURIL) 12.5 mg tablet [Pharmacy Med Name: HYDROCHLOROTHIAZIDE 12.5MG TABLETS] 30 Tab 5     Sig: TAKE 1 TABLET BY MOUTH DAILY

## 2019-06-13 DIAGNOSIS — E78.2 ELEVATED TRIGLYCERIDES WITH HIGH CHOLESTEROL: ICD-10-CM

## 2019-06-13 DIAGNOSIS — I10 ESSENTIAL HYPERTENSION: ICD-10-CM

## 2019-06-13 DIAGNOSIS — I49.3 PVC (PREMATURE VENTRICULAR CONTRACTION): ICD-10-CM

## 2019-06-13 RX ORDER — FENOFIBRATE 145 MG/1
145 TABLET, COATED ORAL DAILY
Qty: 90 TAB | Refills: 1 | Status: SHIPPED | OUTPATIENT
Start: 2019-06-13 | End: 2020-01-23

## 2019-06-13 NOTE — TELEPHONE ENCOUNTER
Last OV 4/25/19  Next OV 10/22/19    Medication refill per VO Dr Shruti Gann. Requested Prescriptions     Pending Prescriptions Disp Refills    fenofibrate nanocrystallized (TRICOR) 145 mg tablet 90 Tab 1     Sig: Take 1 Tab by mouth daily.

## 2019-07-29 DIAGNOSIS — I42.8 OTHER CARDIOMYOPATHY (HCC): ICD-10-CM

## 2019-07-29 DIAGNOSIS — I49.3 PVC (PREMATURE VENTRICULAR CONTRACTION): ICD-10-CM

## 2019-07-29 RX ORDER — METOPROLOL SUCCINATE 25 MG/1
TABLET, EXTENDED RELEASE ORAL
Qty: 30 TAB | Refills: 0 | Status: SHIPPED | OUTPATIENT
Start: 2019-07-29 | End: 2019-11-04 | Stop reason: SDUPTHER

## 2019-07-29 NOTE — TELEPHONE ENCOUNTER
Patient states that she is on vacation and forgot to bring her Metoprolol. Patient would like to know if you can send a 1 week supply so that she can have the medication on vacation. She has not taking the medication in 3 days. Pharmacy confirmed.      Phone: [phone] Abdominal Pain, N/V/D

## 2019-07-29 NOTE — TELEPHONE ENCOUNTER
Patient states she is out of town and has forgotten her Metoprolol at home. She asks if you can send her a supply that will last her a couple of days. (1 week preferably). Pharmacy confirmed.      Phone: [phone]

## 2019-08-12 ENCOUNTER — OFFICE VISIT (OUTPATIENT)
Dept: CARDIOLOGY CLINIC | Age: 69
End: 2019-08-12

## 2019-08-12 VITALS
RESPIRATION RATE: 16 BRPM | BODY MASS INDEX: 30.56 KG/M2 | HEIGHT: 64 IN | SYSTOLIC BLOOD PRESSURE: 126 MMHG | DIASTOLIC BLOOD PRESSURE: 76 MMHG | HEART RATE: 92 BPM | WEIGHT: 179 LBS | OXYGEN SATURATION: 96 %

## 2019-08-12 DIAGNOSIS — E78.2 ELEVATED TRIGLYCERIDES WITH HIGH CHOLESTEROL: ICD-10-CM

## 2019-08-12 DIAGNOSIS — I10 ESSENTIAL HYPERTENSION: ICD-10-CM

## 2019-08-12 DIAGNOSIS — M79.605 PAIN OF LEFT LOWER EXTREMITY: Primary | ICD-10-CM

## 2019-08-12 DIAGNOSIS — Z86.79 HX OF CARDIOMYOPATHY: ICD-10-CM

## 2019-08-12 DIAGNOSIS — I49.3 PVC (PREMATURE VENTRICULAR CONTRACTION): ICD-10-CM

## 2019-08-12 RX ORDER — ACETAMINOPHEN 500 MG
500 TABLET ORAL AS NEEDED
COMMUNITY
End: 2021-05-18

## 2019-08-12 NOTE — PROGRESS NOTES
Tanner Meng, ELI  [address]  [place], [address] [facility]    Office [phone]  Fax [phone]        Frank Hutchinson is a 71 y.o. female who is followed outpatient by Dr. Truman Acosta. Patient is here today for follow-up of PATTI and HTN. Assessment  Encounter Diagnoses   Name Primary?  Pain of left lower extremity Yes    Essential hypertension     Hx of cardiomyopathy     PVC (premature ventricular contraction)     Elevated triglycerides with high cholesterol         Recommendations:  Left LE Pain / Swelling  - pt concerned about clot - will check left LE duplex    HTN  - well controlled on current meds   - having increased positional dizziness; will dc low dose HCTZ to see if this helps  - pt to monitor home BP; call if routinely >130/80    Dyslipidemia  - cont atorvastatin and fenofibrate   - will try to obtain updated lipids from PCP office     Hx of non ischemic cardiomyopathy   -resolved, nml EF last Echo, no s/s HF    Hx of Palpitations  - hx of PVC/NSVT  - previously on mexilitine, off sine 1/2018.    - holter monitor 9/2018 without sig arrhythmia; 14 PVCs captured  - symptoms controlled     Follow-up and Dispositions    · Return in about 3 months (around 11/12/2019), or if symptoms worsen or fail to improve. f/u as previously scheduled or PRN    Subjective:  Pt now with c/o positional dizziness. States symptoms last a few minutes before resolving. No falls or presyncope. Also with c/o new onset left leg pain; pain started about a week ago when stepping up onto something. Denies trauma to the leg or obvious injury. States leg has seemed swollen since with sig pain; is most painful when walking. Had bruising to her calf which has not resolved. Patient denies any exertional chest pain, dyspnea, syncope, orthopnea, or paroxysmal nocturnal dyspnea. Has fleeting palpitations; nothing sustained.           =  Cardiac testing  Lipids 10/24/18 - , HDL 42, , , VLDL 36    Cardiac Testing/ Procedures:     A. Cardiac Cath/PCI:     B. ECHO/ISAAC: 11/8/17 - EF 55-60%/Grade 1 DD  10/5/16-EF 36-70%, grade 1 diastolic dysfunction, mild TR  11/21/14Left ventricle: Systolic function was normal. Ejection fraction was  estimated to be 55 %. There were no regional wall motion abnormalities. Doppler parameters were consistent with abnormal left ventricular  relaxation (grade 1 diastolic dysfunction). Left atrium: The atrium was mildly dilated. Mitral valve: There was mild regurgitation. Tricuspid valve: There was mild regurgitation.      10/2013 - EF 55%     7/2007 - EF 45%     C. StressNuclear/Stress ECHO/Stress test: 11/8/17 - Treadmill test - 4.39 min/nml  04/99 - Persantine cardiolite - EF 48%/No inducible ischemia  D. Vascular:     E. EP: Hx on nonsustained VT/PVC- on mexilitine  12/4/98 - tilt test with marked heart rate variablility and drop in BP without hemodynamic collapse     8/9/16 - Holter - One blocked P wave ( 4.50 AM) ; no PV; SR  2/11/18 to 3/12/18E cardio event monitor-sinus rhythm/one PVC     F. Miscellaneous:          Echo 7/27/07- Ef 40-45%, mild MR, mild TR  Echo 2/1/11- Ef 50%, mild MR, Mild TR  Echo 10/10/12- EF 50-55%, mild LAE, no significant changes when compared to previous study  Echo 10/19/13-EF 55 %,grade 1 diastolic dysfunction, mild LAE  LE artery duplex 9/2/14- Negative for DVT  Echo 11/21/14-EF 55 %, grade 1 diastolic dysfunction, mild LAE, mild MR, mild TR  Echo- 10/3/16: EF 60-65%, mild TR. PASP 29 mmHg.     Past Medical History:   Diagnosis Date    Autonomic dysfunction 12/4/98    tilt test with marked heart rate variablility and drop in BP without hemodynamic collapse    Cancer ([facility] [address] 1997    left breast lumpectomy , Rx RT    Diabetes ([facility] [address]     pre-diabetic, on metformin    Dilated cardiomyopathy ([facility] [address] 2/8/06    nonischemic    Elevated triglycerides with high cholesterol 11/21/2014    Essential hypertension 2/9/2017    Hx MRSA infection 3/26/2018    ADAM SchillingShiela Rosendo Hypothyroidism 1994    Dr. Charlie Hernandez.    Ill-defined condition     high cholesterol    Insulin resistance 11/21/2014    Mixed hyperlipidemia with apolipoprotein E2 variant 11/21/2014    Nausea & vomiting     Osteoarthritis     diffuse. Dr. Stephanie Gutierrezache Other and unspecified hyperlipidemia 12/21/2010    PONV (postoperative nausea and vomiting)     Psychiatric disorder     anxiety    PVC's 1994    Sleep apnea     Not wearing CPAP    Vitamin D deficiency 11/21/2014        Current Outpatient Medications   Medication Sig Dispense Refill    acetaminophen (TYLENOL) 500 mg tablet Take 1,000 mg by mouth.  metoprolol succinate (TOPROL-XL) 25 mg XL tablet take 1 tablet by mouth once daily (NOT IN TOWN NEED EMERGENCY SUPPLY) 30 Tab 0    fenofibrate nanocrystallized (TRICOR) 145 mg tablet Take 1 Tab by mouth daily. 90 Tab 1    atorvastatin (LIPITOR) 10 mg tablet TAKE 1 TABLET NIGHTLY 90 Tab 0    losartan (COZAAR) 50 mg tablet Take 1 Tab by mouth daily. 90 Tab 0    sertraline (ZOLOFT) 100 mg tablet Take 100 mg by mouth daily.  metFORMIN (GLUCOPHAGE) 1,000 mg tablet Take 1 Tab by mouth two (2) times daily (with meals). 180 Tab 3    aspirin delayed-release 81 mg tablet Take 162 mg by mouth nightly.  levothyroxine (SYNTHROID) 137 mcg tablet Take 137 mcg by mouth Daily (before breakfast).  cholecalciferol, VITAMIN D3, (VITAMIN D3) 5,000 unit tab tablet Take 5,000 Units by mouth daily.  raloxifene (EVISTA) 60 mg tablet Take 60 mg by mouth daily.  CYANOCOBALAMIN (VITAMIN B-12 IJ) by Injection route every fourteen (14) days. SQ         Allergies   Allergen Reactions    Lactose Diarrhea and Nausea Only    Sulfa (Sulfonamide Antibiotics) Hives      Constitutional: Negative for fever, chills, malaise/fatigue and diaphoresis. Respiratory: Negative for cough, hemoptysis, sputum production, shortness of breath and wheezing. Cardiovascular: Negative for chest pain, palpitations, orthopnea, claudication,  and PND. +left leg swelling  Gastrointestinal: Negative for heartburn, nausea, vomiting, blood in stool and melena. Genitourinary: Negative for dysuria and flank pain. Musculoskeletal: Negative for joint pain and back pain. Skin: Negative for rash. Neurological: Negative for focal weakness, seizures, loss of consciousness, weakness and headaches. Endo/Heme/Allergies: Negative for abnormal bleeding. Psychiatric/Behavioral: Negative for memory loss.      Physical Exam    Visit Vitals  /76 (BP 1 Location: Right arm, BP Patient Position: Sitting)   Pulse 92   Resp 16   Ht 5' 4\" (1.626 m)   Wt 179 lb (81.2 kg)   SpO2 96%   BMI 30.73 kg/m²     Wt Readings from Last 3 Encounters:   08/12/19 179 lb (81.2 kg)   04/25/19 173 lb 12.8 oz (78.8 kg)   03/28/19 170 lb 3.2 oz (77.2 kg)      General - well developed well nourished  Neck - no JVD  Cardiac - normal S1, S2, RRR no murmurs,   Vascular - radials and pedal pulses equal bilateral  Lungs - clear to auscultation bilaterals, no rales, wheezing or rhonchi  Abd - soft nontender,non distended, +BS  Extremities - trace to 1+ non-pitting left leg swelling (L>R), left leg tender to palpation, warm, well perfused  Skin - no rash  Neuro - nonfocal  Psych - normal mood and affect    Labs:  4/18/19: Mg 1.9, BUN 1, Cr 0.55, eGFR 96, Na 144, K 4.2, CO2 22    Lab Results   Component Value Date/Time    Sodium 142 01/08/2019 09:50 AM    Potassium 4.7 01/08/2019 09:50 AM    Chloride 105 01/08/2019 09:50 AM    CO2 18 (L) 01/08/2019 09:50 AM    Anion gap 7 04/10/2018 04:36 AM    Glucose 73 01/08/2019 09:50 AM    BUN 15 01/08/2019 09:50 AM    Creatinine 0.49 (L) 01/08/2019 09:50 AM    BUN/Creatinine ratio 31 (H) 01/08/2019 09:50 AM    GFR est  01/08/2019 09:50 AM    GFR est non- 01/08/2019 09:50 AM    Calcium 9.5 01/08/2019 09:50 AM     Lab Results   Component Value Date/Time    WBC 8.6 03/26/2018 11:47 AM    HGB 8.6 (L) 04/10/2018 04:36 AM    HCT 37.7 03/26/2018 11:47 AM    PLATELET 785 80/52/2504 11:47 AM    MCV 91.1 03/26/2018 11:47 AM     Lab Results   Component Value Date/Time    Cholesterol, total 180 12/04/2018 09:37 AM    HDL Cholesterol 42 12/04/2018 09:37 AM    LDL, calculated 102 (H) 12/04/2018 09:37 AM    VLDL, calculated 36 12/04/2018 09:37 AM    Triglyceride 180 (H) 12/04/2018 09:37 AM     Yelena Pacheco, ANP

## 2019-08-12 NOTE — LETTER
8/12/19 Patient: Nadine Cifuentes YOB: 1950 Date of Visit: 8/12/2019 Heather Ortiz, [address] [address] [place] [address] VIA Facsimile: [phone] Dear Heather Ortiz MD, Thank you for referring Ms. Percy Cisneros to [facility] for evaluation. My notes for this consultation are attached. If you have questions, please do not hesitate to call me. I look forward to following your patient along with you.  
 
 
Sincerely, 
 
He Bennett NP

## 2019-08-12 NOTE — PROGRESS NOTES
All health maintenance and other pertinent information has been reviewed in preparation for today's office visit. Patient presents in the office today for:    Chief Complaint   Patient presents with    Claudication     Pt c/o lower left extremoity pain in back of calf. Onset: approx 10 days ago. States its painful (5/10). Tender to touch.  Dizziness     Pt states has also expereinced dizziness on occassion. Last event was early this a.m. 1. Have you been to the ER, urgent care clinic since your last visit? Hospitalized since your last visit? No    2. Have you seen or consulted any other health care providers outside of the [address] [place] [address] since your last visit? Include any pap smears or colon screening.  No    Extended / Orthostatic Vitals:  Patient Position 2: Supine  BP 2: 140/70  Pulse 2: 81  Patient Position 3: Sitting  BP 3: 130/76  Pulse 3: 88  Patient Position 4: Standing  BP 4: 130/80  Pulse 4: 91

## 2019-10-15 RX ORDER — LOSARTAN POTASSIUM 50 MG/1
TABLET ORAL
Qty: 90 TAB | Refills: 0 | Status: SHIPPED | OUTPATIENT
Start: 2019-10-15 | End: 2020-01-15

## 2019-11-02 ENCOUNTER — PATIENT MESSAGE (OUTPATIENT)
Dept: CARDIOLOGY CLINIC | Age: 69
End: 2019-11-02

## 2019-11-02 DIAGNOSIS — I49.3 PVC (PREMATURE VENTRICULAR CONTRACTION): ICD-10-CM

## 2019-11-02 DIAGNOSIS — I42.8 OTHER CARDIOMYOPATHY (HCC): ICD-10-CM

## 2019-11-04 RX ORDER — METOPROLOL SUCCINATE 25 MG/1
25 TABLET, EXTENDED RELEASE ORAL DAILY
Qty: 30 TAB | Refills: 0 | Status: SHIPPED | OUTPATIENT
Start: 2019-11-04 | End: 2019-12-04 | Stop reason: SDUPTHER

## 2019-11-25 ENCOUNTER — OFFICE VISIT (OUTPATIENT)
Dept: CARDIOLOGY CLINIC | Age: 69
End: 2019-11-25

## 2019-11-25 VITALS
DIASTOLIC BLOOD PRESSURE: 88 MMHG | HEART RATE: 89 BPM | OXYGEN SATURATION: 96 % | HEIGHT: 64 IN | WEIGHT: 189.6 LBS | SYSTOLIC BLOOD PRESSURE: 118 MMHG | BODY MASS INDEX: 32.37 KG/M2

## 2019-11-25 DIAGNOSIS — I10 ESSENTIAL HYPERTENSION: ICD-10-CM

## 2019-11-25 DIAGNOSIS — I49.3 PVC (PREMATURE VENTRICULAR CONTRACTION): Primary | ICD-10-CM

## 2019-11-25 DIAGNOSIS — E78.5 DYSLIPIDEMIA: ICD-10-CM

## 2019-11-25 NOTE — PROGRESS NOTES
Tessa Mcguire is a 71 y.o. female    Chief Complaint   Patient presents with    Hypertension    Other     PVC, Hx of CM       Chest pain No    SOB No    Dizziness patient states some dizziness 1 week ago for 3 days    Swelling No    Refills No    Visit Vitals  /88 (BP 1 Location: Left arm, BP Patient Position: Sitting)   Pulse 89   Ht 5' 4\" (1.626 m)   Wt 189 lb 9.6 oz (86 kg)   SpO2 96%   BMI 32.54 kg/m²       1. Have you been to the ER, urgent care clinic since your last visit? Hospitalized since your last visit? No    2. Have you seen or consulted any other health care providers outside of the [address] [place] [address] since your last visit? Include any pap smears or colon screening.   [facility] [address] about 1 week ago

## 2019-11-25 NOTE — PROGRESS NOTES
Maldonado Aceves MD    [address] [facility] [address] [facility]    Office [phone],SQK [phone]  Pager [phone]    Sydni Bah is a 71 y.o. female is here for f/u visit . 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    Insulin resistance E88.81    Vitamin D deficiency E55.9    Elevated triglycerides with high cholesterol E78.2    Fibromyalgia M79.7    Spinal stenosis of cervical region M48.02    Facet arthropathy, cervical M47.812    Lumbar facet arthropathy M47.816    Bilateral arm pain M79.601, M79.602    Bilateral carpal tunnel syndrome G56.03    Hx of cardiomyopathy Z86.79    Essential hypertension I10    History of ventricular tachycardia Z86.79    Lumbar radiculopathy, right M54.16    Sensorimotor neuropathy G62.9    Radicular syndrome of right leg M54.10    Hx MRSA infection Z86.14    Arthritis of right knee M17.11       Chief complaint:  Chief Complaint   Patient presents with    Hypertension    Other     PVC, Hx of CM       Assessment:    Hx of PVC/nonsustained VT - was on Mexiletine. It was discontinued because Holter monitor did not show any PVCs/significant abnormal rhythms. After discontinuing mexiletine patient will E cardio event monitor. Hx of non ischemic cardiomyopathy -resolved ;normal EF on ECHO 11/2014 ; 10/2016 ; 11/8/17  Hx of orthostasis/dizziness - currently aysmpotmatic  HTN  HLD            Plan:     Continue antihypertensives ( on B Blocker).   E cardio event monitor after stopping mexiletine-sinus rhythm/1 PVC/no significant arrhythmias  11/8/17  Normal stress study.  F/u 6mths - ECHO on f/u visit  To see new PCP soon. She will get blood work done including lipids. Followed by Dr. Jesus Ragland who recently did blood work. Patient understands the plan. All questions were answered to the patient's satisfaction. Medication Side Effects and Warnings were discussed with patient: yes  Patient Labs were reviewed and or requested:  yes  Patient Past Records were reviewed and or requested: yes    I appreciate the opportunity to be involved in Ms. Jass Ferraro. See note below for details. Please do not hesitate to contact us with questions or concerns. Shira Spence MD    Cardiac Testing/ Procedures: A. Cardiac Cath/PCI:    B.ECHO/ISAAC:  11/8/17 - EF 55-60%/Grade 1 DD  10/5/16-EF 77-66%, grade 1 diastolic dysfunction, mild TR  11/21/14Left ventricle: Systolic function was normal. Ejection fraction was  estimated to be 55 %. There were no regional wall motion abnormalities. Doppler parameters were consistent with abnormal left ventricular  relaxation (grade 1 diastolic dysfunction). Left atrium: The atrium was mildly dilated. Mitral valve: There was mild regurgitation. Tricuspid valve: There was mild regurgitation. 10/2013 - EF 55%    7/2007 - EF 45%    C. StressNuclear/Stress ECHO/Stress test: 11/8/17 - Treadmill test - 4.39 min/nml  04/99 - Persantine cardiolite - EF 48%/No inducible ischemia  D. Vascular:    E. EP: Hx on nonsustained VT/PVC- on mexilitine  12/4/98 - tilt test with marked heart rate variablility and drop in BP without hemodynamic collapse    8/9/16 - Holter - One blocked P wave ( 4.50 AM) ; no PV; SR  2/11/18 to 3/12/18E  cardio event monitor-sinus rhythm/one PVC    F. Miscellaneous:    Subjective:  Jeff Silva is a 71 y.o. female who returns for follow up  Visit.  Doing well  No papitations/dizziness  No CP/dyspnea         ROS:  (bold if positive, if negative)             Medications before admission:    Current Outpatient Medications   Medication Sig Dispense    metoprolol succinate (TOPROL-XL) 25 mg XL tablet Take 1 Tab by mouth daily. 30 Tab    losartan (COZAAR) 50 mg tablet TAKE 1 TABLET DAILY 90 Tab    fenofibrate nanocrystallized (TRICOR) 145 mg tablet Take 1 Tab by mouth daily. 90 Tab    atorvastatin (LIPITOR) 10 mg tablet TAKE 1 TABLET NIGHTLY 90 Tab    sertraline (ZOLOFT) 100 mg tablet Take 100 mg by mouth daily.  metFORMIN (GLUCOPHAGE) 1,000 mg tablet Take 1 Tab by mouth two (2) times daily (with meals). 180 Tab    aspirin delayed-release 81 mg tablet Take 162 mg by mouth nightly.  levothyroxine (SYNTHROID) 137 mcg tablet Take 137 mcg by mouth Daily (before breakfast).  cholecalciferol, VITAMIN D3, (VITAMIN D3) 5,000 unit tab tablet Take 5,000 Units by mouth daily.  raloxifene (EVISTA) 60 mg tablet Take 60 mg by mouth daily.  CYANOCOBALAMIN (VITAMIN B-12 IJ) by Injection route every fourteen (14) days. SQ     acetaminophen (TYLENOL) 500 mg tablet Take 1,000 mg by mouth. No current facility-administered medications for this visit. Physical Exam:  Visit Vitals  /88 (BP 1 Location: Left arm, BP Patient Position: Sitting)   Pulse 89   Ht 5' 4\" (1.626 m)   Wt 189 lb 9.6 oz (86 kg)   SpO2 96%   BMI 32.54 kg/m²          Gen: Well-developed, well-nourished, in no acute distress  Neck: Supple,No JVD, No Carotid Bruit,   Resp: No accessory muscle use, Clear breath sounds, No rales or rhonchi  Card: Regular Rate,Rythm,Normal S1, S2, No murmurs, rubs or gallop. No thrills.    Abd:  Soft, non-tender, non-distended,BS+,   MSK: No cyanosis  Skin: No rashes    Neuro: moving all four extremities , follows commands appropriately  Psych:  Good insight, oriented to person, place , alert, Nml Affect  LE: No edema    EKG: None, normal axis, RSR'pattern V1 normal intervals      LABS:        Lab Results   Component Value Date/Time    WBC 8.6 03/26/2018 11:47 AM    HGB 8.6 (L) 04/10/2018 04:36 AM    HCT 37.7 03/26/2018 11:47 AM    PLATELET 501 11/64/2492 11:47 AM    MCV 91.1 03/26/2018 11:47 AM     Lab Results   Component Value Date/Time    Sodium 142 01/08/2019 09:50 AM    Potassium 4.7 01/08/2019 09:50 AM    Chloride 105 01/08/2019 09:50 AM    CO2 18 (L) 01/08/2019 09:50 AM    Anion gap 7 04/10/2018 04:36 AM    Glucose 73 01/08/2019 09:50 AM    BUN 15 01/08/2019 09:50 AM    Creatinine 0.49 (L) 01/08/2019 09:50 AM    BUN/Creatinine ratio 31 (H) 01/08/2019 09:50 AM    GFR est  01/08/2019 09:50 AM    GFR est non- 01/08/2019 09:50 AM    Calcium 9.5 01/08/2019 09:50 AM       Lab Results   Component Value Date/Time    aPTT 25.2 09/14/2015 12:06 PM     Lab Results   Component Value Date/Time    INR 1.1 03/26/2018 11:47 AM    INR 1.0 09/14/2015 12:06 PM    Prothrombin time 11.2 (H) 03/26/2018 11:47 AM    Prothrombin time 10.4 09/14/2015 12:06 PM     No components found for: Tremaine Stanley MD

## 2019-12-04 ENCOUNTER — TELEPHONE (OUTPATIENT)
Dept: CARDIOLOGY CLINIC | Age: 69
End: 2019-12-04

## 2019-12-04 DIAGNOSIS — I49.3 PVC (PREMATURE VENTRICULAR CONTRACTION): ICD-10-CM

## 2019-12-04 DIAGNOSIS — I42.8 OTHER CARDIOMYOPATHY (HCC): ICD-10-CM

## 2019-12-04 RX ORDER — METOPROLOL SUCCINATE 25 MG/1
25 TABLET, EXTENDED RELEASE ORAL DAILY
Qty: 90 TAB | Refills: 1 | Status: SHIPPED | OUTPATIENT
Start: 2019-12-04 | End: 2020-07-05 | Stop reason: SDUPTHER

## 2019-12-04 RX ORDER — METOPROLOL SUCCINATE 25 MG/1
25 TABLET, EXTENDED RELEASE ORAL DAILY
Qty: 30 TAB | Refills: 0 | Status: SHIPPED | OUTPATIENT
Start: 2019-12-04 | End: 2019-12-04 | Stop reason: SDUPTHER

## 2019-12-04 NOTE — TELEPHONE ENCOUNTER
Pt states that she is almost out of her metoprolol and that she would like a 30 day supply sent over to the [facility]'s pharmacy on file and than a 90 supply sent to her mail order pharmacy on file. Thanks.      Phone: [phone]

## 2019-12-04 NOTE — TELEPHONE ENCOUNTER
Medication refills per VO Dr Suraj Centeno. Patient requesting a 90 day supply to go to mail order. Requested Prescriptions     Pending Prescriptions Disp Refills    metoprolol succinate (TOPROL-XL) 25 mg XL tablet 90 Tab 1     Sig: Take 1 Tab by mouth daily.

## 2020-01-08 RX ORDER — HYDROCHLOROTHIAZIDE 12.5 MG/1
TABLET ORAL
Qty: 30 TAB | Refills: 5 | OUTPATIENT
Start: 2020-01-08

## 2020-01-10 ENCOUNTER — TELEPHONE (OUTPATIENT)
Dept: CARDIOLOGY CLINIC | Age: 70
End: 2020-01-10

## 2020-01-10 NOTE — TELEPHONE ENCOUNTER
Called patient no answer, LM/vm in regards to HCTZ per NP Maximino Essex it was discontinued at [address] 4/2019.

## 2020-01-10 NOTE — TELEPHONE ENCOUNTER
Patient called would like to know if she can restart the HCTZ. It was discontinued by LUIS Cantu on OV 8/12/19 due to increased positional dizziness. Patient states she is feeling fine no dizziness noted and her BP is running 120's over 70's. Please advise.

## 2020-01-15 RX ORDER — LOSARTAN POTASSIUM 50 MG/1
TABLET ORAL
Qty: 90 TAB | Refills: 0 | Status: SHIPPED | OUTPATIENT
Start: 2020-01-15 | End: 2020-04-07

## 2020-01-23 DIAGNOSIS — E78.2 ELEVATED TRIGLYCERIDES WITH HIGH CHOLESTEROL: ICD-10-CM

## 2020-01-23 DIAGNOSIS — I10 ESSENTIAL HYPERTENSION: ICD-10-CM

## 2020-01-23 DIAGNOSIS — I49.3 PVC (PREMATURE VENTRICULAR CONTRACTION): ICD-10-CM

## 2020-01-23 RX ORDER — FENOFIBRATE 145 MG/1
TABLET, COATED ORAL
Qty: 90 TAB | Refills: 1 | Status: SHIPPED | OUTPATIENT
Start: 2020-01-23 | End: 2021-01-04 | Stop reason: SDUPTHER

## 2020-02-27 ENCOUNTER — OFFICE VISIT (OUTPATIENT)
Dept: CARDIOLOGY CLINIC | Age: 70
End: 2020-02-27

## 2020-02-27 VITALS
HEIGHT: 64 IN | OXYGEN SATURATION: 95 % | BODY MASS INDEX: 30.97 KG/M2 | DIASTOLIC BLOOD PRESSURE: 72 MMHG | SYSTOLIC BLOOD PRESSURE: 128 MMHG | WEIGHT: 181.4 LBS | HEART RATE: 99 BPM

## 2020-02-27 DIAGNOSIS — I10 ESSENTIAL HYPERTENSION: Primary | ICD-10-CM

## 2020-02-27 DIAGNOSIS — R25.2 CRAMPS OF LOWER EXTREMITY: ICD-10-CM

## 2020-02-27 DIAGNOSIS — E78.5 DYSLIPIDEMIA: ICD-10-CM

## 2020-02-27 DIAGNOSIS — Z86.79 HX OF CARDIOMYOPATHY: ICD-10-CM

## 2020-02-27 RX ORDER — ESCITALOPRAM OXALATE 20 MG/1
TABLET ORAL
COMMUNITY
Start: 2020-01-30 | End: 2021-09-24 | Stop reason: SDUPTHER

## 2020-02-27 RX ORDER — ROSUVASTATIN CALCIUM 10 MG/1
TABLET, COATED ORAL DAILY
COMMUNITY
Start: 2020-01-29 | End: 2020-09-29 | Stop reason: ALTCHOICE

## 2020-02-27 NOTE — PROGRESS NOTES
Cassandra Knight MD    [address] [facility], [address] [facility]    Office [phone],AJF [phone]  Pager [phone]    Ansley Beaver is a 71 y.o. female is here for f/u visit . 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    Insulin resistance E88.81    Vitamin D deficiency E55.9    Elevated triglycerides with high cholesterol E78.2    Fibromyalgia M79.7    Spinal stenosis of cervical region M48.02    Facet arthropathy, cervical M47.812    Lumbar facet arthropathy M47.816    Bilateral arm pain M79.601, M79.602    Bilateral carpal tunnel syndrome G56.03    Hx of cardiomyopathy Z86.79    Essential hypertension I10    History of ventricular tachycardia Z86.79    Lumbar radiculopathy, right M54.16    Sensorimotor neuropathy G62.9    Radicular syndrome of right leg M54.10    Hx MRSA infection Z86.14    Arthritis of right knee M17.11       Chief complaint:  Chief Complaint   Patient presents with    Hypertension    Other     dyslipidemia, PVC       Assessment:    Hx of PVC/nonsustained VT - was on Mexiletine. It was discontinued because Holter monitor did not show any PVCs/significant abnormal rhythms. After discontinuing mexiletine patient will E cardio event monitor. Hx of non ischemic cardiomyopathy -resolved ;normal EF on ECHO 11/2014 ; 10/2016 ; 11/8/17  Hx of orthostasis/dizziness - currently aysmpotmatic  HTN  HLD            Plan:     Continue antihypertensives ( on B Blocker).   E cardio event monitor after stopping mexiletine-sinus rhythm/1 PVC/no significant arrhythmias  11/8/17  Normal stress study.  Echo/ERIC    Followed by Dr. Mejia Urrutia in 6 months or earlier as needed    Patient understands the plan. All questions were answered to the patient's satisfaction. Medication Side Effects and Warnings were discussed with patient: yes  Patient Labs were reviewed and or requested:  yes  Patient Past Records were reviewed and or requested: yes    I appreciate the opportunity to be involved in Ms. Warner Brandon. See note below for details. Please do not hesitate to contact us with questions or concerns. Toney Toro MD    Cardiac Testing/ Procedures: A. Cardiac Cath/PCI:    B.ECHO/ISAAC:  11/8/17 - EF 55-60%/Grade 1 DD  10/5/16-EF 08-62%, grade 1 diastolic dysfunction, mild TR  11/21/14Left ventricle: Systolic function was normal. Ejection fraction was  estimated to be 55 %. There were no regional wall motion abnormalities. Doppler parameters were consistent with abnormal left ventricular  relaxation (grade 1 diastolic dysfunction). Left atrium: The atrium was mildly dilated. Mitral valve: There was mild regurgitation. Tricuspid valve: There was mild regurgitation. 10/2013 - EF 55%    7/2007 - EF 45%    C. StressNuclear/Stress ECHO/Stress test: 11/8/17 - Treadmill test - 4.39 min/nml  04/99 - Persantine cardiolite - EF 48%/No inducible ischemia  D. Vascular:    E. EP: Hx on nonsustained VT/PVC- on mexilitine  12/4/98 - tilt test with marked heart rate variablility and drop in BP without hemodynamic collapse    8/9/16 - Holter - One blocked P wave ( 4.50 AM) ; no PV; SR  2/11/18 to 3/12/18E  cardio event monitor-sinus rhythm/one PVC    F. Miscellaneous:    Subjective:  Canelo Viramontes is a 71 y.o. female who returns for follow up  Visit.  Doing well  No papitations/dizziness  No CP/dyspnea  Was recently told that she had enlarged heart based on chest x-ray by pulmonologist.  She has been having a chronic cough for which she has been getting evaluated by pulmonary. Also saw foot physician and was recommended getting test for circulation in both her lower extremities. Patient has a negative vascular ultrasound to rule out DVT-left lower extremity 8/2019         ROS:  (bold if positive, if negative)             Medications before admission:    Current Outpatient Medications   Medication Sig Dispense    rosuvastatin (CRESTOR) 10 mg tablet      escitalopram oxalate (LEXAPRO) 20 mg tablet      fenofibrate nanocrystallized (TRICOR) 145 mg tablet TAKE 1 TABLET DAILY 90 Tab    losartan (COZAAR) 50 mg tablet TAKE 1 TABLET DAILY 90 Tab    metoprolol succinate (TOPROL-XL) 25 mg XL tablet Take 1 Tab by mouth daily. 90 Tab    acetaminophen (TYLENOL) 500 mg tablet Take 1,000 mg by mouth.  atorvastatin (LIPITOR) 10 mg tablet TAKE 1 TABLET NIGHTLY 90 Tab    metFORMIN (GLUCOPHAGE) 1,000 mg tablet Take 1 Tab by mouth two (2) times daily (with meals). 180 Tab    aspirin delayed-release 81 mg tablet Take 162 mg by mouth nightly.  levothyroxine (SYNTHROID) 137 mcg tablet Take 137 mcg by mouth Daily (before breakfast).  cholecalciferol, VITAMIN D3, (VITAMIN D3) 5,000 unit tab tablet Take 5,000 Units by mouth daily.  raloxifene (EVISTA) 60 mg tablet Take 60 mg by mouth daily.  CYANOCOBALAMIN (VITAMIN B-12 IJ) by Injection route every fourteen (14) days. SQ     sertraline (ZOLOFT) 100 mg tablet Take 100 mg by mouth daily. No current facility-administered medications for this visit. Physical Exam:  Visit Vitals  /72 (BP 1 Location: Left arm, BP Patient Position: Sitting)   Pulse 99   Ht 5' 4\" (1.626 m)   Wt 181 lb 6.4 oz (82.3 kg)   SpO2 95%   BMI 31.14 kg/m²          Gen: Well-developed, well-nourished, in no acute distress  Neck: Supple,No JVD, No Carotid Bruit,   Resp: No accessory muscle use, Clear breath sounds, No rales or rhonchi  Card: Regular Rate,Rythm,Normal S1, S2, No murmurs, rubs or gallop. No thrills.    Abd:  Soft, non-tender, non-distended,BS+,   MSK: No cyanosis  Skin: No rashes    Neuro: moving all four extremities , follows commands appropriately  Psych:  Good insight, oriented to person, place , alert, Nml Affect  LE: No edema          LABS:        Lab Results   Component Value Date/Time    WBC 8.6 03/26/2018 11:47 AM    HGB 8.6 (L) 04/10/2018 04:36 AM    HCT 37.7 03/26/2018 11:47 AM    PLATELET 544 23/73/2079 11:47 AM    MCV 91.1 03/26/2018 11:47 AM     Lab Results   Component Value Date/Time    Sodium 142 01/08/2019 09:50 AM    Potassium 4.7 01/08/2019 09:50 AM    Chloride 105 01/08/2019 09:50 AM    CO2 18 (L) 01/08/2019 09:50 AM    Anion gap 7 04/10/2018 04:36 AM    Glucose 73 01/08/2019 09:50 AM    BUN 15 01/08/2019 09:50 AM    Creatinine 0.49 (L) 01/08/2019 09:50 AM    BUN/Creatinine ratio 31 (H) 01/08/2019 09:50 AM    GFR est  01/08/2019 09:50 AM    GFR est non- 01/08/2019 09:50 AM    Calcium 9.5 01/08/2019 09:50 AM       Lab Results   Component Value Date/Time    aPTT 25.2 09/14/2015 12:06 PM     Lab Results   Component Value Date/Time    INR 1.1 03/26/2018 11:47 AM    INR 1.0 09/14/2015 12:06 PM    Prothrombin time 11.2 (H) 03/26/2018 11:47 AM    Prothrombin time 10.4 09/14/2015 12:06 PM     No components found for: Cheryl Cox MD

## 2020-02-27 NOTE — LETTER
2/27/20 Patient: Stella Chand YOB: 1950 Date of Visit: 2/27/2020 Jeronimo Eaton, [address]  [address] [place] [address] VIA Facsimile: [phone] Dear Jeronimo Eaton MD, Thank you for referring Ms. Percy Cisneros to [facility] for evaluation. My notes for this consultation are attached. If you have questions, please do not hesitate to call me. I look forward to following your patient along with you. Sincerely, Elsa Garza MD

## 2020-02-27 NOTE — PROGRESS NOTES
Luis Daniel Jesus is a 71 y.o. female    Chief Complaint   Patient presents with    Hypertension    Other     dyslipidemia, PVC     Patient was seen by a pulmonary doctor yesterday for a cough. Patient states she just feels     Chest pain No    SOB No; patient states she feels like she cannot catch her breath    Dizziness No    Swelling No    Refills No    Visit Vitals  /72 (BP 1 Location: Left arm, BP Patient Position: Sitting)   Pulse 99   Ht 5' 4\" (1.626 m)   Wt 181 lb 6.4 oz (82.3 kg)   SpO2 95%   BMI 31.14 kg/m²       1. Have you been to the ER, urgent care clinic since your last visit? Hospitalized since your last visit? No    2. Have you seen or consulted any other health care providers outside of the [address] [place] [address] since your last visit? Include any pap smears or colon screening.   No

## 2020-03-18 ENCOUNTER — TELEPHONE (OUTPATIENT)
Dept: CARDIOLOGY CLINIC | Age: 70
End: 2020-03-18

## 2020-03-20 NOTE — TELEPHONE ENCOUNTER
Called patient ID verified X2 reviewed below results per Dr Arabella Platt. ECHO - nml EF/Mild valvular abnormalities   ERIC  - Normal     Patient verbalized understanding.

## 2020-04-07 RX ORDER — LOSARTAN POTASSIUM 50 MG/1
TABLET ORAL
Qty: 90 TAB | Refills: 0 | Status: SHIPPED | OUTPATIENT
Start: 2020-04-07 | End: 2020-06-18

## 2020-06-18 RX ORDER — LOSARTAN POTASSIUM 50 MG/1
TABLET ORAL
Qty: 90 TAB | Refills: 0 | Status: SHIPPED | OUTPATIENT
Start: 2020-06-18 | End: 2020-10-05

## 2020-07-05 DIAGNOSIS — I49.3 PVC (PREMATURE VENTRICULAR CONTRACTION): ICD-10-CM

## 2020-07-05 DIAGNOSIS — I42.8 OTHER CARDIOMYOPATHY (HCC): ICD-10-CM

## 2020-07-07 RX ORDER — METOPROLOL SUCCINATE 25 MG/1
25 TABLET, EXTENDED RELEASE ORAL DAILY
Qty: 90 TAB | Refills: 1 | Status: SHIPPED | OUTPATIENT
Start: 2020-07-07 | End: 2020-12-10

## 2020-09-22 ENCOUNTER — TELEPHONE (OUTPATIENT)
Dept: CARDIOLOGY CLINIC | Age: 70
End: 2020-09-22

## 2020-09-25 ENCOUNTER — DOCUMENTATION ONLY (OUTPATIENT)
Dept: CARDIOLOGY CLINIC | Age: 70
End: 2020-09-25

## 2020-09-25 ENCOUNTER — OFFICE VISIT (OUTPATIENT)
Dept: CARDIOLOGY CLINIC | Age: 70
End: 2020-09-25
Payer: MEDICARE

## 2020-09-25 VITALS
SYSTOLIC BLOOD PRESSURE: 130 MMHG | BODY MASS INDEX: 31.48 KG/M2 | DIASTOLIC BLOOD PRESSURE: 84 MMHG | HEART RATE: 84 BPM | OXYGEN SATURATION: 96 % | HEIGHT: 64 IN | WEIGHT: 184.4 LBS

## 2020-09-25 DIAGNOSIS — I10 ESSENTIAL HYPERTENSION: Primary | ICD-10-CM

## 2020-09-25 DIAGNOSIS — Z86.79 HX OF CARDIOMYOPATHY: ICD-10-CM

## 2020-09-25 DIAGNOSIS — Z01.818 PRE-OP EXAM: ICD-10-CM

## 2020-09-25 DIAGNOSIS — I49.3 PVC (PREMATURE VENTRICULAR CONTRACTION): ICD-10-CM

## 2020-09-25 PROCEDURE — 93010 ELECTROCARDIOGRAM REPORT: CPT | Performed by: INTERNAL MEDICINE

## 2020-09-25 PROCEDURE — G0463 HOSPITAL OUTPT CLINIC VISIT: HCPCS | Performed by: INTERNAL MEDICINE

## 2020-09-25 PROCEDURE — 3017F COLORECTAL CA SCREEN DOC REV: CPT | Performed by: INTERNAL MEDICINE

## 2020-09-25 PROCEDURE — G8427 DOCREV CUR MEDS BY ELIG CLIN: HCPCS | Performed by: INTERNAL MEDICINE

## 2020-09-25 PROCEDURE — 99214 OFFICE O/P EST MOD 30 MIN: CPT | Performed by: INTERNAL MEDICINE

## 2020-09-25 PROCEDURE — G8432 DEP SCR NOT DOC, RNG: HCPCS | Performed by: INTERNAL MEDICINE

## 2020-09-25 PROCEDURE — 93005 ELECTROCARDIOGRAM TRACING: CPT | Performed by: INTERNAL MEDICINE

## 2020-09-25 PROCEDURE — 1101F PT FALLS ASSESS-DOCD LE1/YR: CPT | Performed by: INTERNAL MEDICINE

## 2020-09-25 PROCEDURE — G8754 DIAS BP LESS 90: HCPCS | Performed by: INTERNAL MEDICINE

## 2020-09-25 PROCEDURE — G8417 CALC BMI ABV UP PARAM F/U: HCPCS | Performed by: INTERNAL MEDICINE

## 2020-09-25 PROCEDURE — G8536 NO DOC ELDER MAL SCRN: HCPCS | Performed by: INTERNAL MEDICINE

## 2020-09-25 PROCEDURE — G8399 PT W/DXA RESULTS DOCUMENT: HCPCS | Performed by: INTERNAL MEDICINE

## 2020-09-25 PROCEDURE — G8752 SYS BP LESS 140: HCPCS | Performed by: INTERNAL MEDICINE

## 2020-09-25 PROCEDURE — 1090F PRES/ABSN URINE INCON ASSESS: CPT | Performed by: INTERNAL MEDICINE

## 2020-09-25 NOTE — PROGRESS NOTES
Tessa Mcguire is a 79 y.o. female    Chief Complaint   Patient presents with    Surgical Clearance    Hypertension    Cholesterol Problem     Left Shoulder surgery 10/10/2020-patient will call back with correct doctor name and information. Chest pain No    SOB patient states some SOB    Dizziness No    Swelling No    Refills No    Visit Vitals  /84 (BP 1 Location: Left arm, BP Patient Position: Sitting)   Pulse 84   Ht 5' 4\" (1.626 m)   Wt 184 lb 6.4 oz (83.6 kg)   SpO2 96%   BMI 31.65 kg/m²       1. Have you been to the ER, urgent care clinic since your last visit? Hospitalized since your last visit? no    2. Have you seen or consulted any other health care providers outside of the [address] [place] [address] since your last visit? Include any pap smears or colon screening.   no

## 2020-09-25 NOTE — Clinical Note
9/30/20 Patient: David Peoples YOB: 1950 Date of Visit: 9/25/2020 Doris Whitt NP 
[address] [facility] [place] [address] VIA Facsimile: [phone] Bharathi Weeks MD 
[address] [place] [address]. 
[address] [place] [address] VIA Dear LUIS Camacho MD, Thank you for referring Ms. Percy Cisneros to [facility] for evaluation. My notes for this consultation are attached. If you have questions, please do not hesitate to call me. I look forward to following your patient along with you. Sincerely, Daniela Woods MD

## 2020-09-25 NOTE — PROGRESS NOTES
Tina Pradhan MD    [id number] [facility], [address] [facility]    Office [phone],[facility] [phone]      Mian Dudley is a 79 y.o. female is here for f/u visit . Primary care physician:  Thompson Wakefield NP    Patient Active Problem List   Diagnosis Code    Hypothyroidism, secondary E03.8    PVC (premature ventricular contraction) I49.3    Other and unspecified hyperlipidemia E78.5    Cardiomyopathy ([address] I42.9    Dizziness R42    Mild carpal tunnel syndrome G56.00    Arm pain, inferior M79.603    Mixed hyperlipidemia with apolipoprotein E2 variant E78.2    Insulin resistance E88.81    Vitamin D deficiency E55.9    Elevated triglycerides with high cholesterol E78.2    Fibromyalgia M79.7    Spinal stenosis of cervical region M48.02    Facet arthropathy, cervical M47.812    Lumbar facet arthropathy M47.816    Bilateral arm pain M79.601, M79.602    Bilateral carpal tunnel syndrome G56.03    Hx of cardiomyopathy Z86.79    Essential hypertension I10    History of ventricular tachycardia Z86.79    Lumbar radiculopathy, right M54.16    Sensorimotor neuropathy G62.9    Radicular syndrome of right leg M54.10    Hx MRSA infection Z86.14    Arthritis of right knee M17.11       Chief complaint:  Chief Complaint   Patient presents with    Surgical Clearance    Hypertension    Cholesterol Problem       Assessment:    Hx of PVC/nonsustained VT - was on Mexiletine. It was discontinued because Holter monitor did not show any PVCs/significant abnormal rhythms. E cardio event monitor after stopping mexiletine (2018)-sinus rhythm/1 PVC/no significant arrhythmias    Hx of non ischemic cardiomyopathy -resolved ;normal EF on ECHO 11/2014 ; 10/2016 ; 11/8/17  Hx of orthostasis/dizziness - currently aysmpotmatic  HTN  HLD  Scheduled to get ortho ( shoulder) surgery)          Plan:     Continue antihypertensives ( on B Blocker).   Stress ECHO - pre op  Carotid doppler  Followed by Dr. Dyllan Hernandez in 6 months or earlier as needed      Add: Nml Stress ECHO 9/29/20 - Can proceed with orthopedic surgery    Patient understands the plan. All questions were answered to the patient's satisfaction. Medication Side Effects and Warnings were discussed with patient: yes  Patient Labs were reviewed and or requested:  yes  Patient Past Records were reviewed and or requested: yes    I appreciate the opportunity to be involved in [facility]. See note below for details. Please do not hesitate to contact us with questions or concerns. Tina Pradhan MD    Cardiac Testing/ Procedures: A. Cardiac Cath/PCI:    B.ECHO/ISAAC:    3/12/20 -EF 55-60%/Grade 1 DD/Trace MR  11/8/17 - EF 55-60%/Grade 1 DD  10/5/16-EF 70-99%, grade 1 diastolic dysfunction, mild TR  11/21/14Left ventricle: Systolic function was normal. Ejection fraction was  estimated to be 55 %. There were no regional wall motion abnormalities. Doppler parameters were consistent with abnormal left ventricular  relaxation (grade 1 diastolic dysfunction). Left atrium: The atrium was mildly dilated. Mitral valve: There was mild regurgitation. Tricuspid valve: There was mild regurgitation. 10/2013 - EF 55%    7/2007 - EF 45%    C. StressNuclear/Stress ECHO/Stress test: 11/8/17 - Treadmill test - 4.39 min/nml  04/99 - Persantine cardiolite - EF 48%/No inducible ischemia      D. Vascular: 3/12/20 - Nml ERIC bilat    E. EP: Hx on nonsustained VT/PVC- on mexilitine  12/4/98 - tilt test with marked heart rate variablility and drop in BP without hemodynamic collapse    8/9/16 - Holter - One blocked P wave ( 4.50 AM) ; no PV; SR  2/11/18 to 3/12/18E  cardio event monitor-sinus rhythm/one PVC    F. Miscellaneous:    Subjective:  Mian Dudley is a 79 y.o. female who returns for follow up  Visit.  Doing well  No papitations/dizziness  No CP/dyspnea  Scheduled for ortho surgery         ROS:  (bold if positive, if negative)             Medications before admission:    Current Outpatient Medications   Medication Sig Dispense    metoprolol succinate (TOPROL-XL) 25 mg XL tablet Take 1 Tab by mouth daily. 90 Tab    losartan (COZAAR) 50 mg tablet TAKE 1 TABLET DAILY 90 Tab    rosuvastatin (CRESTOR) 10 mg tablet daily.  escitalopram oxalate (LEXAPRO) 20 mg tablet      fenofibrate nanocrystallized (TRICOR) 145 mg tablet TAKE 1 TABLET DAILY 90 Tab    acetaminophen (TYLENOL) 500 mg tablet Take 500 mg by mouth as needed.  atorvastatin (LIPITOR) 10 mg tablet TAKE 1 TABLET NIGHTLY 90 Tab    metFORMIN (GLUCOPHAGE) 1,000 mg tablet Take 1 Tab by mouth two (2) times daily (with meals). 180 Tab    levothyroxine (SYNTHROID) 137 mcg tablet Take 137 mcg by mouth Daily (before breakfast).  cholecalciferol, VITAMIN D3, (VITAMIN D3) 5,000 unit tab tablet Take 5,000 Units by mouth daily.  raloxifene (EVISTA) 60 mg tablet Take 60 mg by mouth daily.  CYANOCOBALAMIN (VITAMIN B-12 IJ) by Injection route every fourteen (14) days. SQ     sertraline (ZOLOFT) 100 mg tablet Take 100 mg by mouth daily.  aspirin delayed-release 81 mg tablet Take 81 mg by mouth nightly. No current facility-administered medications for this visit. Physical Exam:  Visit Vitals  /84 (BP 1 Location: Left arm, BP Patient Position: Sitting)   Pulse 84   Ht 5' 4\" (1.626 m)   Wt 184 lb 6.4 oz (83.6 kg)   SpO2 96%   BMI 31.65 kg/m²          Gen: Well-developed, well-nourished, in no acute distress  Neck: Supple,No JVD, No Carotid Bruit,   Resp: No accessory muscle use, Clear breath sounds, No rales or rhonchi  Card: Regular Rate,Rythm,Normal S1, S2, No murmurs, rubs or gallop. No thrills.    Abd:  Soft, non-tender, non-distended,BS+,   MSK: No cyanosis  Skin: No rashes    Neuro: moving all four extremities , follows commands appropriately  Psych:  Good insight, oriented to person, place , alert, Nml Affect  LE: No edema    EKG - SR/nml axis/Nml intervals      LABS:        Lab Results   Component Value Date/Time    WBC 8.6 03/26/2018 11:47 AM    HGB 8.6 (L) 04/10/2018 04:36 AM    HCT 37.7 03/26/2018 11:47 AM    PLATELET 165 90/90/3780 11:47 AM    MCV 91.1 03/26/2018 11:47 AM     Lab Results   Component Value Date/Time    Sodium 142 01/08/2019 09:50 AM    Potassium 4.7 01/08/2019 09:50 AM    Chloride 105 01/08/2019 09:50 AM    CO2 18 (L) 01/08/2019 09:50 AM    Anion gap 7 04/10/2018 04:36 AM    Glucose 73 01/08/2019 09:50 AM    BUN 15 01/08/2019 09:50 AM    Creatinine 0.49 (L) 01/08/2019 09:50 AM    BUN/Creatinine ratio 31 (H) 01/08/2019 09:50 AM    GFR est  01/08/2019 09:50 AM    GFR est non- 01/08/2019 09:50 AM    Calcium 9.5 01/08/2019 09:50 AM       Lab Results   Component Value Date/Time    aPTT 25.2 09/14/2015 12:06 PM     Lab Results   Component Value Date/Time    INR 1.1 03/26/2018 11:47 AM    INR 1.0 09/14/2015 12:06 PM    Prothrombin time 11.2 (H) 03/26/2018 11:47 AM    Prothrombin time 10.4 09/14/2015 12:06 PM     No components found for: Hector Whatley MD

## 2020-09-25 NOTE — PROGRESS NOTES
Called and scheduled patient for Exercise Stress Echo in the hospital. Patient is scheduled for Tuesday, September 29th at 10:30 am. Informed patient to arrive 30 minutes prior to the test.  I was also informed by  they no longer need to be COVID tested prior to procedure.

## 2020-09-29 ENCOUNTER — HOSPITAL ENCOUNTER (OUTPATIENT)
Dept: NON INVASIVE DIAGNOSTICS | Age: 70
Discharge: HOME | End: 2020-09-29
Attending: INTERNAL MEDICINE
Payer: MEDICARE

## 2020-09-29 VITALS
HEIGHT: 64 IN | WEIGHT: 184.4 LBS | BODY MASS INDEX: 31.48 KG/M2 | SYSTOLIC BLOOD PRESSURE: 140 MMHG | DIASTOLIC BLOOD PRESSURE: 72 MMHG

## 2020-09-29 DIAGNOSIS — R06.02 SHORTNESS OF BREATH: ICD-10-CM

## 2020-09-29 DIAGNOSIS — I10 ESSENTIAL (PRIMARY) HYPERTENSION: ICD-10-CM

## 2020-09-29 DIAGNOSIS — Z01.818 PRE-OP EXAM: ICD-10-CM

## 2020-09-29 PROCEDURE — 93351 STRESS TTE COMPLETE: CPT

## 2020-09-30 ENCOUNTER — ANCILLARY PROCEDURE (OUTPATIENT)
Dept: CARDIOLOGY CLINIC | Age: 70
End: 2020-09-30
Payer: MEDICARE

## 2020-09-30 VITALS — HEIGHT: 64 IN | WEIGHT: 184 LBS | BODY MASS INDEX: 31.41 KG/M2

## 2020-09-30 DIAGNOSIS — Z01.810 PREOP CARDIOVASCULAR EXAM: ICD-10-CM

## 2020-09-30 LAB
LEFT CCA DIST DIAS: 20.2 CENTIMETER/SECOND
LEFT CCA DIST SYS: 82.4 CENTIMETER/SECOND
LEFT CCA PROX DIAS: 20.5 CENTIMETER/SECOND
LEFT CCA PROX SYS: 87.6 CENTIMETER/SECOND
LEFT ECA DIAS: 9.41 CENTIMETER/SECOND
LEFT ECA SYS: 103.4 CENTIMETER/SECOND
LEFT ICA DIST DIAS: 20.2 CENTIMETER/SECOND
LEFT ICA DIST SYS: 63.8 CENTIMETER/SECOND
LEFT ICA MID DIAS: 14 CENTIMETER/SECOND
LEFT ICA MID SYS: 77.8 CENTIMETER/SECOND
LEFT ICA PROX DIAS: 21.9 CENTIMETER/SECOND
LEFT ICA PROX SYS: 94.9 CENTIMETER/SECOND
LEFT ICA/CCA SYS: 1.08
LEFT VERTEBRAL DIAS: 17.06 CENTIMETER/SECOND
LEFT VERTEBRAL SYS: 54.4 CENTIMETER/SECOND
RIGHT CCA DIST DIAS: 18 CENTIMETER/SECOND
RIGHT CCA DIST SYS: 80.8 CENTIMETER/SECOND
RIGHT CCA PROX DIAS: 17.7 CENTIMETER/SECOND
RIGHT CCA PROX SYS: 75.4 CENTIMETER/SECOND
RIGHT ECA DIAS: 8.26 CENTIMETER/SECOND
RIGHT ECA SYS: 83.3 CENTIMETER/SECOND
RIGHT ICA DIST DIAS: 18.1 CENTIMETER/SECOND
RIGHT ICA DIST SYS: 62.9 CENTIMETER/SECOND
RIGHT ICA MID DIAS: 20.9 CENTIMETER/SECOND
RIGHT ICA MID SYS: 74.9 CENTIMETER/SECOND
RIGHT ICA PROX DIAS: 18.5 CENTIMETER/SECOND
RIGHT ICA PROX SYS: 83.3 CENTIMETER/SECOND
RIGHT ICA/CCA SYS: 1
RIGHT VERTEBRAL DIAS: 9.66 CENTIMETER/SECOND
RIGHT VERTEBRAL SYS: 58.2 CENTIMETER/SECOND
STRESS ANGINA INDEX: 0
STRESS BASELINE DIAS BP: 72 MMHG
STRESS BASELINE HR: 83 BPM
STRESS BASELINE SYS BP: 140 MMHG
STRESS ESTIMATED WORKLOAD: 7 METS
STRESS EXERCISE DUR MIN: NORMAL
STRESS PEAK DIAS BP: 90 MMHG
STRESS PEAK SYS BP: 190 MMHG
STRESS PERCENT HR ACHIEVED: 89 %
STRESS POST PEAK HR: 134 BPM
STRESS RATE PRESSURE PRODUCT: NORMAL BPM*MMHG
STRESS TARGET HR: 150 BPM

## 2020-09-30 PROCEDURE — 93880 EXTRACRANIAL BILAT STUDY: CPT | Performed by: INTERNAL MEDICINE

## 2020-10-01 ENCOUNTER — DOCUMENTATION ONLY (OUTPATIENT)
Dept: CARDIOLOGY CLINIC | Age: 70
End: 2020-10-01

## 2020-10-02 ENCOUNTER — TELEPHONE (OUTPATIENT)
Dept: CARDIOLOGY CLINIC | Age: 70
End: 2020-10-02

## 2020-10-05 RX ORDER — LOSARTAN POTASSIUM 50 MG/1
TABLET ORAL
Qty: 90 TAB | Refills: 0 | Status: SHIPPED | OUTPATIENT
Start: 2020-10-05 | End: 2020-12-23 | Stop reason: SDUPTHER

## 2020-11-28 DIAGNOSIS — I49.3 PVC (PREMATURE VENTRICULAR CONTRACTION): ICD-10-CM

## 2020-11-28 DIAGNOSIS — I42.8 OTHER CARDIOMYOPATHY (HCC): ICD-10-CM

## 2020-12-09 ENCOUNTER — OFFICE VISIT (OUTPATIENT)
Dept: NEUROLOGY | Age: 70
End: 2020-12-09
Payer: MEDICARE

## 2020-12-09 VITALS
SYSTOLIC BLOOD PRESSURE: 124 MMHG | HEIGHT: 64 IN | WEIGHT: 178 LBS | RESPIRATION RATE: 18 BRPM | DIASTOLIC BLOOD PRESSURE: 70 MMHG | BODY MASS INDEX: 30.39 KG/M2

## 2020-12-09 DIAGNOSIS — R94.09 ABNORMAL TILT TABLE TEST: ICD-10-CM

## 2020-12-09 DIAGNOSIS — G62.89 OTHER POLYNEUROPATHY: Primary | ICD-10-CM

## 2020-12-09 PROCEDURE — 1090F PRES/ABSN URINE INCON ASSESS: CPT | Performed by: PSYCHIATRY & NEUROLOGY

## 2020-12-09 PROCEDURE — G8399 PT W/DXA RESULTS DOCUMENT: HCPCS | Performed by: PSYCHIATRY & NEUROLOGY

## 2020-12-09 PROCEDURE — G8536 NO DOC ELDER MAL SCRN: HCPCS | Performed by: PSYCHIATRY & NEUROLOGY

## 2020-12-09 PROCEDURE — 1101F PT FALLS ASSESS-DOCD LE1/YR: CPT | Performed by: PSYCHIATRY & NEUROLOGY

## 2020-12-09 PROCEDURE — G8752 SYS BP LESS 140: HCPCS | Performed by: PSYCHIATRY & NEUROLOGY

## 2020-12-09 PROCEDURE — G8754 DIAS BP LESS 90: HCPCS | Performed by: PSYCHIATRY & NEUROLOGY

## 2020-12-09 PROCEDURE — 3017F COLORECTAL CA SCREEN DOC REV: CPT | Performed by: PSYCHIATRY & NEUROLOGY

## 2020-12-09 PROCEDURE — G8432 DEP SCR NOT DOC, RNG: HCPCS | Performed by: PSYCHIATRY & NEUROLOGY

## 2020-12-09 PROCEDURE — 99214 OFFICE O/P EST MOD 30 MIN: CPT | Performed by: PSYCHIATRY & NEUROLOGY

## 2020-12-09 PROCEDURE — G8427 DOCREV CUR MEDS BY ELIG CLIN: HCPCS | Performed by: PSYCHIATRY & NEUROLOGY

## 2020-12-09 PROCEDURE — G8417 CALC BMI ABV UP PARAM F/U: HCPCS | Performed by: PSYCHIATRY & NEUROLOGY

## 2020-12-09 RX ORDER — DULOXETIN HYDROCHLORIDE 30 MG/1
CAPSULE, DELAYED RELEASE ORAL
Qty: 42 CAP | Refills: 0 | Status: SHIPPED | OUTPATIENT
Start: 2020-12-09 | End: 2021-01-06

## 2020-12-09 NOTE — PROGRESS NOTES
Neurology Progress Note    Patient ID:   Jacquie Baird  [id number]  79 y.o.  1950  PCP: Blanca Romero NP    Date of Office Visit: 12/9/2020    Chief Complaint   Patient presents with    Leg Pain    Restless Leg Syndrome       Interval Hx:     Following up due to peripheral neuropathy (DM vs remotely diagnosed autonomic dysfunction by til-table testing in 1998  Last seen in Sept 2018. Feet constantly feel numb/ reduced sensation/ episodic burning in feet  Occasionally numbness/ reduced sensation/ swollen sensation going up to both knees  Has frequent cramping in either arm  Has episodic numbness in either arm  Denies any weakness in arms or legs  Denies any gait difficulty  Has hx of left total shoulder replacement in Oct 2020    Tried/ failed: Gabapentin (couldn't tolerate), lyrica (couldn't tolerate due to mood cognitive side effect)      Brief ROS: as noted above or otherwise negative    Brief Hx/ Prior Data:     Had MRI C-spine in Oct 2014 for arm complaints: had multi-level ddd, facet arthropathy    Had EMG both arm in Nov 2014: no cervical radiculopathy, or ulnar neuropathy, did have CTS in both hands    5-: At last visit, discussed EMG results (mild polyneuropathy and right lumbar radiculopathy). Continued to c/o tingling in both legs and less frequent pain down right leg. Checked labs: normal TSH, SPEP, B12, and BMP. Unclear if the mild peripheral neuropathy is due to her pre-DM or her hx of autonomic dysfunction (dx in 1998). Couldn't tolerate Gabapentin. Changed to Cymbalta 30 mg x 1 month then up to 60 mg/ day (for depression sx and to help with neuropathy pain). D/w her for right lumbar radicular pain, advised her to do PT (given referral at a prior visit) and if not improving we could order MRI L-spine for further evaluation. Since starting Cymbalta, not noticing the neuropathy pains in her lower legs as much. Mood isn't better, no worse, and having crying spells more often. Mood was better on Zoloft. Had knee surgery 6 weeks ago. 3 weeks later started having recurrence/ worsening of right lumbar radicular pain. Describes it as sharp, severe, radiating from right buttock down side of right leg and calf, top of foot. Has done 4 weeks of PT for her knee at this point. Didn't start PT for her back as she had to undergo knee surgery    9-6-18: \"1) Right lumbar radicular pain: Resolved. MRI L-spine unremarkable. 2). Sensorimotor peripheral neuropathy (from hx of pre-DM or remote hx of autonomic neuropathy): Pt doesn't want to restart any medication. 3. RLS: Pt doesn't want to try higher dose of Lyrica or other med at this point. If it gets worse, she'll call back to request a medication. 4. F/u prn\"      Objective: Allergies: Allergies   Allergen Reactions    Lactose Diarrhea and Nausea Only    Sulfa (Sulfonamide Antibiotics) Hives       Meds:     Current Outpatient Medications   Medication Sig    DULoxetine (CYMBALTA) 30 mg capsule Take 1 Cap by mouth Every morning for 14 days, THEN 2 Caps Every morning for 14 days.  losartan (COZAAR) 50 mg tablet TAKE 1 TABLET DAILY    escitalopram oxalate (LEXAPRO) 20 mg tablet     fenofibrate nanocrystallized (TRICOR) 145 mg tablet TAKE 1 TABLET DAILY    acetaminophen (TYLENOL) 500 mg tablet Take 500 mg by mouth as needed.  atorvastatin (LIPITOR) 10 mg tablet TAKE 1 TABLET NIGHTLY    metFORMIN (GLUCOPHAGE) 1,000 mg tablet Take 1 Tab by mouth two (2) times daily (with meals).  aspirin delayed-release 81 mg tablet Take 81 mg by mouth nightly.  levothyroxine (SYNTHROID) 137 mcg tablet Take 137 mcg by mouth Daily (before breakfast).  cholecalciferol, VITAMIN D3, (VITAMIN D3) 5,000 unit tab tablet Take 5,000 Units by mouth daily.  raloxifene (EVISTA) 60 mg tablet Take 60 mg by mouth daily.  CYANOCOBALAMIN (VITAMIN B-12 IJ) by Injection route every fourteen (14) days.  SQ    metoprolol succinate (TOPROL-XL) 25 mg XL tablet TAKE 1 TABLET DAILY       PMHx:   Past Medical History:   Diagnosis Date    Autonomic dysfunction 12/4/98    tilt test with marked heart rate variablility and drop in BP without hemodynamic collapse    Cancer ([facility] [address] 1997    left breast lumpectomy , Rx RT    Diabetes ([address]     pre-diabetic, on metformin    Dilated cardiomyopathy ([facility] [address] 2/8/06    nonischemic    Elevated triglycerides with high cholesterol 11/21/2014    Essential hypertension 2/9/2017    Hx MRSA infection 3/26/2018    Gabby Osei Hypothyroidism 1994    Dr. Leonidas Márquez.   [facility] Ill-defined condition     high cholesterol    Insulin resistance 11/21/2014    Mixed hyperlipidemia with apolipoprotein E2 variant 11/21/2014    Nausea & vomiting     Osteoarthritis     diffuse. Dr. Janae Hammond Other and unspecified hyperlipidemia 12/21/2010    PONV (postoperative nausea and vomiting)     Psychiatric disorder     anxiety    PVC's 1994    Sleep apnea     Not wearing CPAP    Vitamin D deficiency 11/21/2014       PSHx:  has a past surgical history that includes hx orthopaedic (Left, 1988); hx appendectomy (1969); hx hysterectomy (1986); hx salpingo-oophorectomy (1988); hx heart catheterization (1994); hx breast lumpectomy (Left, 1997); hx mastectomy (5/2015); hx breast reconstruction (Bilateral, 9/28/2015); hx breast augmentation (Bilateral, 12/18/2015); hx cholecystectomy (2015); hx cataract removal (Bilateral, 2008); hx wisdom teeth extraction; hx colonoscopy (2014); hx endoscopy (2014); hx hernia repair (4/22 16); and hx breast augmentation (Bilateral, 10/14/2016). SocHx:  reports that she has never smoked. She has never used smokeless tobacco. She reports that she does not drink alcohol or use drugs. FHx: family history includes Cancer in her mother; Coronary Artery Disease in her brother; Heart Attack in her paternal grandmother and paternal uncle;  Heart Disease in her father; Kidney Disease in her mother; No Known Problems in her daughter, daughter, daughter, and daughter. PHYSICAL EXAM    Vitals:    12/09/20 1258   BP: 124/70   Resp: 18   Weight: 80.7 kg (178 lb)   Height: 5' 4\" (1.626 m)       General:   Mental status: Awake, alert, cooperative. Neck: supple    CV: not examined, not relevant    Lungs: not examined, not relevant  Extremities: no edema    Skin: no rashes    Neuro Exam:    CNs:   Facial movements: symmetric. Visual fields; intact in all quadrants, bilateral   EOM: conjugate eye movements bilateral    Hearing: normal    Speech: no aphasia, no dysarthria, normal fluency    Motor:  5/5 in upper and lower extremities, symmetric. Cerebellar:  No resting or postural tremors  Sensory: reduced vibration in feet and knees    Reflexes:  1+ patellars, trace achilles  Gait: normal     Impression/ Plan:       ICD-10-CM ICD-9-CM    1. Other polyneuropathy  G62.89 357.89 DULoxetine (CYMBALTA) 30 mg capsule      REFERRAL TO NEUROLOGY      EMG LIMITED   2.  Abnormal tilt table test  R94.09 794.09        Check EMG both legs and left arm (eval for peripheral neuroapthy and possible CTS left hand)  Referred her for ANS testing with Dr Glennie Snellen to eval for autonomic disturbance  Discussed trying Cymbalta 30 mg daily (tapering up) for peripheral neuropathy symptoms  Pt is interested in trying; sent Rx to pharmacy    F/u after EMG, ANS testing

## 2020-12-10 RX ORDER — METOPROLOL SUCCINATE 25 MG/1
TABLET, EXTENDED RELEASE ORAL
Qty: 90 TAB | Refills: 1 | Status: SHIPPED | OUTPATIENT
Start: 2020-12-10 | End: 2021-01-04 | Stop reason: SDUPTHER

## 2020-12-11 ENCOUNTER — TELEPHONE (OUTPATIENT)
Dept: NEUROLOGY | Age: 70
End: 2020-12-11

## 2020-12-11 NOTE — TELEPHONE ENCOUNTER
----- Message from Veronica Butcher sent at 2020 12:41 PM EST -----  Regarding: Dr. Chema Dickey: [phone]  Patient return call    Caller's first and last name and relationship (if not the patient): n/a      Best contact number(s): [phone]      Whose call is being returned: unknown      Details to clarify the request: testing      Veronica Butcher

## 2020-12-28 RX ORDER — LOSARTAN POTASSIUM 50 MG/1
TABLET ORAL
Qty: 90 TAB | Refills: 0 | Status: SHIPPED | OUTPATIENT
Start: 2020-12-28 | End: 2021-01-04 | Stop reason: SDUPTHER

## 2020-12-29 ENCOUNTER — TELEPHONE (OUTPATIENT)
Dept: CARDIOLOGY CLINIC | Age: 70
End: 2020-12-29

## 2020-12-29 NOTE — TELEPHONE ENCOUNTER
Patient calling to state bp and heart rate have been going up, \"swishing noise in ears for 4 days. \" Patient states she would like to know if she should come in.     Phone: [phone]

## 2020-12-31 NOTE — TELEPHONE ENCOUNTER
Returned patient's call she stated she is feeling much better she thinks it was related to her allergies.

## 2021-01-04 DIAGNOSIS — I42.8 OTHER CARDIOMYOPATHY (HCC): ICD-10-CM

## 2021-01-04 DIAGNOSIS — I10 ESSENTIAL HYPERTENSION: ICD-10-CM

## 2021-01-04 DIAGNOSIS — I49.3 PVC (PREMATURE VENTRICULAR CONTRACTION): ICD-10-CM

## 2021-01-04 DIAGNOSIS — E78.2 ELEVATED TRIGLYCERIDES WITH HIGH CHOLESTEROL: ICD-10-CM

## 2021-01-08 ENCOUNTER — NURSE TRIAGE (OUTPATIENT)
Dept: OTHER | Facility: CLINIC | Age: 71
End: 2021-01-08

## 2021-01-08 RX ORDER — FENOFIBRATE 145 MG/1
145 TABLET, COATED ORAL DAILY
Qty: 90 TAB | Refills: 1 | Status: SHIPPED | OUTPATIENT
Start: 2021-01-08 | End: 2021-03-02 | Stop reason: SDUPTHER

## 2021-01-08 RX ORDER — METFORMIN HYDROCHLORIDE 1000 MG/1
1000 TABLET ORAL 2 TIMES DAILY WITH MEALS
Qty: 180 TAB | Refills: 3 | OUTPATIENT
Start: 2021-01-08

## 2021-01-08 RX ORDER — LOSARTAN POTASSIUM 50 MG/1
TABLET ORAL
Qty: 90 TAB | Refills: 1 | Status: SHIPPED | OUTPATIENT
Start: 2021-01-08 | End: 2021-03-02 | Stop reason: SDUPTHER

## 2021-01-08 RX ORDER — METOPROLOL SUCCINATE 25 MG/1
25 TABLET, EXTENDED RELEASE ORAL DAILY
Qty: 90 TAB | Refills: 1 | Status: SHIPPED | OUTPATIENT
Start: 2021-01-08 | End: 2021-03-02 | Stop reason: SDUPTHER

## 2021-01-08 NOTE — TELEPHONE ENCOUNTER
Refill per VO of Dr. Balaji Reagan:  Last appt: 2/27/2020  Future Appointments   Date Time Provider Pedro Pagani   1/11/2021 10:00 AM [facility]EDULE [facility] BS AMB   1/26/2021  8:40 AM Juanis Stapleton MD [facility] BS AMB   3/16/2021 11:00 AM [facility]       Requested Prescriptions     Signed Prescriptions Disp Refills    fenofibrate nanocrystallized (TRICOR) 145 mg tablet 90 Tab 1     Sig: Take 1 Tab by mouth daily. Authorizing Provider: Stacy Dias     Ordering User: Trish Wise metoprolol succinate (TOPROL-XL) 25 mg XL tablet 90 Tab 1     Sig: Take 1 Tab by mouth daily. Authorizing Provider: Stacy Dias     Ordering User: Didier Garcia    losartan (COZAAR) 50 mg tablet 90 Tab 1     Sig: TAKE 1 TABLET DAILY     Authorizing Provider: Stacy Dias     Ordering User: Ace Mort     Refused Prescriptions Disp Refills    metFORMIN (GLUCOPHAGE) 1,000 mg tablet 180 Tab 3     Sig: Take 1 Tab by mouth two (2) times daily (with meals).      Refused By: Ace Jose     Reason for Refusal: other

## 2021-01-08 NOTE — TELEPHONE ENCOUNTER
Reason for Disposition   COVID-19, questions about    Answer Assessment - Initial Assessment Questions  1. COVID-19 CLOSE CONTACT: \"Who is the person with the confirmed or suspected COVID-19 infection that you were exposed to? \"      No close exposure    2. PLACE of CONTACT: \"Where were you when you were exposed to COVID-19? \" (e.g., home, school, medical waiting room; which city?)      Home    3. TYPE of CONTACT: \"How much contact was there? \" (e.g., sitting next to, live in same house, work in same office, same building)      No contact with Covid +    4. DURATION of CONTACT: \"How long were you in contact with the COVID-19 patient? \" (e.g., a few seconds, passed by person, a few minutes, 15 minutes or longer, live with the patient)      No contact with covid = person. It was spouse of the visitor    5. MASK: \"Were you wearing a mask? \" \"Was the other person wearing a mask? \" Note: wearing a mask reduces the risk of an otherwise close contact. No    6. DATE of CONTACT: \"When did you have contact with a COVID-19 patient? \" (e.g., how many days ago)      Yesterday    7. COMMUNITY SPREAD: \"Are there lots of cases of COVID-19 (community spread) where you live? \" (See public health department website, if unsure)        Unknown    8. SYMPTOMS: \"Do you have any symptoms? \" (e.g., fever, cough, breathing difficulty, loss of taste or smell)      No    9. PREGNANCY OR POSTPARTUM: \"Is there any chance you are pregnant? \" \"When was your last menstrual period? \" \"Did you deliver in the last 2 weeks? \"      No    10. HIGH RISK: \"Do you have any heart or lung problems? Do you have a weak immune system? \" (e.g., heart failure, COPD, asthma, HIV positive, chemotherapy, renal failure, diabetes mellitus, sickle cell anemia, obesity)        No    11. TRAVEL: \"Have you traveled out of the country recently? \" If so, \"When and where? \" Also ask about out-of-state travel, since the CDC has identified some high-risk cities for community spread in the [address]. Note: Travel becomes less relevant if there is widespread community transmission where the patient lives. No    Protocols used: CORONAVIRUS (COVID-19 ) EXPOSURE-ADULT-[place]    Pt had NO contact with COVID + person. The Covid + person's spouse was at her house and they sat at opposite ends of the table. Covid + spouse not having symptoms. Pt has appointment on Monday, making sure it was okay to go.

## 2021-01-11 ENCOUNTER — OFFICE VISIT (OUTPATIENT)
Dept: NEUROLOGY | Age: 71
End: 2021-01-11
Payer: MEDICARE

## 2021-01-11 VITALS — TEMPERATURE: 98.1 F

## 2021-01-11 DIAGNOSIS — G62.9 SENSORIMOTOR NEUROPATHY: Primary | ICD-10-CM

## 2021-01-11 DIAGNOSIS — M54.17 LUMBOSACRAL RADICULOPATHY AT S1: ICD-10-CM

## 2021-01-11 DIAGNOSIS — M54.16 RIGHT LUMBAR RADICULOPATHY: ICD-10-CM

## 2021-01-11 DIAGNOSIS — G56.02 MILD CARPAL TUNNEL SYNDROME, LEFT: ICD-10-CM

## 2021-01-11 PROCEDURE — G8399 PT W/DXA RESULTS DOCUMENT: HCPCS | Performed by: PSYCHIATRY & NEUROLOGY

## 2021-01-11 PROCEDURE — 1101F PT FALLS ASSESS-DOCD LE1/YR: CPT | Performed by: PSYCHIATRY & NEUROLOGY

## 2021-01-11 PROCEDURE — G8432 DEP SCR NOT DOC, RNG: HCPCS | Performed by: PSYCHIATRY & NEUROLOGY

## 2021-01-11 PROCEDURE — G8536 NO DOC ELDER MAL SCRN: HCPCS | Performed by: PSYCHIATRY & NEUROLOGY

## 2021-01-11 PROCEDURE — 95886 MUSC TEST DONE W/N TEST COMP: CPT | Performed by: PSYCHIATRY & NEUROLOGY

## 2021-01-11 PROCEDURE — G8427 DOCREV CUR MEDS BY ELIG CLIN: HCPCS | Performed by: PSYCHIATRY & NEUROLOGY

## 2021-01-11 PROCEDURE — 99214 OFFICE O/P EST MOD 30 MIN: CPT | Performed by: PSYCHIATRY & NEUROLOGY

## 2021-01-11 PROCEDURE — 3017F COLORECTAL CA SCREEN DOC REV: CPT | Performed by: PSYCHIATRY & NEUROLOGY

## 2021-01-11 PROCEDURE — 1090F PRES/ABSN URINE INCON ASSESS: CPT | Performed by: PSYCHIATRY & NEUROLOGY

## 2021-01-11 PROCEDURE — G8417 CALC BMI ABV UP PARAM F/U: HCPCS | Performed by: PSYCHIATRY & NEUROLOGY

## 2021-01-11 PROCEDURE — G8756 NO BP MEASURE DOC: HCPCS | Performed by: PSYCHIATRY & NEUROLOGY

## 2021-01-11 PROCEDURE — 95913 NRV CNDJ TEST 13/> STUDIES: CPT | Performed by: PSYCHIATRY & NEUROLOGY

## 2021-01-11 NOTE — PROCEDURES
EMG/ NCS Report  DRUG REHABILITATION  - DAY ONE RESIDENCE  [facility] [place]  [facility] [place], [address] [place], [address]   Ph: [phone]   FAX: [phone]/ [phone]  Test Date:  2019      Test Date:  2021    Patient: Aida Garcia : 1950 Physician: Jose Edouard M.D. Sex: Female Height: ' \" Ref Phys: Jose Edouard M.D.   ID#: [id number] Weight:  lbs. Technician: Kayla Lopez     Patient History:    CC: PERIPHERIAL NEUROPATHY    EMG & NCV Findings:  Evaluation of the left Fibular motor nerve showed prolonged distal onset latency (6.9 ms), reduced amplitude (0.2 mV), normal conduction velocity (B Fib-Ankle, 49 m/s), and decreased conduction velocity (Poplt-B Fib, 10 m/s). The right Fibular motor nerve showed normal distal onset latency (5.5 ms), reduced amplitude (0.2 mV), normal conduction velocity (B Fib-Ankle, 39 m/s), and normal conduction velocity (Poplt-B Fib, 91 m/s). The left Fibular TA motor and the right Fibular TA motor nerves showed normal distal onset latency (L2.8, R3.0 ms), normal amplitude (L5.8, R6.5 mV), and normal conduction velocity (Poplit-Fib Head, L77, R71 m/s). The left median motor nerve showed normal distal onset latency (3.7 ms), normal amplitude (7.4 mV), and decreased conduction velocity (Elbow-Wrist, 45 m/s). The left tibial motor and the right tibial motor nerves showed normal distal onset latency (L4.3, R4.4 ms), normal amplitude (L4.9, R5.3 mV), and decreased conduction velocity (Knee-Ankle, L36, R35 m/s). The left ulnar motor nerve showed normal distal onset latency (2.8 ms), normal amplitude (7.7 mV), normal conduction velocity (B Elbow-Wrist, 51 m/s), and normal conduction velocity (A Elbow-B Elbow, 59 m/s). The left median sensory and the left radial sensory nerves showed normal distal peak latency (L3.8, L2.0 ms) and reduced amplitude (L7.4, L8.2 µV).   The left Sup Fibular sensory and the right Sup Fibular sensory nerves showed no response (Lower leg). The left sural sensory and the right sural sensory nerves showed no response (Calf). The left ulnar sensory nerve showed normal distal peak latency (3.1 ms) and normal amplitude (21.6 µV). The left median/ulnar (palm) comparison nerve showed normal distal onset latency (Median Palm, 1.8 ms), normal distal peak latency (Median Palm, 2.2 ms), reduced amplitude (Median Palm, 8.2 µV), normal distal onset latency (Ulnar Palm, 1.2 ms), normal distal peak latency (Ulnar Palm, 1.9 ms), and normal amplitude (Ulnar Palm, 8.4 µV). All F Wave latencies were within normal limits. Left vs. Right comparison data for the tibial H-reflex indicates abnormal L-R latency difference (2.43 ms). Needle evaluation of the left abductor pollicis brevis, the left first dorsal interosseous, the left extensor digitorum brevis, and the right extensor digitorum brevis muscles showed diminished recruitment and increased motor unit amplitude. The left vastus medialis and the right vastus medialis muscles showed increased motor unit amplitude. The right medial gastrocnemius muscle showed increased insertional activity, moderately increased spontaneous activity, and increased motor unit amplitude. The right gluteus luis muscle showed slightly increased spontaneous activity. The right Lower Lumb Parasp muscle showed increased insertional activity and moderately increased spontaneous activity. All remaining muscles (as indicated in the following table) showed no evidence of electrical instability.       Absent lower extremity sensory responses  Absent fibular motor responses at EDB (may be EDB atrophy)  Normal bilateral fibular motor responses at TA   Mild slowing of bilateral tibial motor responses    Normal left ulnar motor and sensory response, normal left ulnar F-wave  Mild reduced amplitude of left radial sensory response  Moderate reduced amplitude of left median sensory response   Mildly reduced conduction velocity left median motor response      Impression:    Chronic right S1 lumbar radiculopathy    No electrodiagnostic evidence of left lumbar radiculopathy    Symmetric, sensory greater than motor, peripheral neuropathy    Mild left median neuropathy (i.e. carpal tunnel)    No left ulnar neuropathy    Mild chronic neurogenic motor unit changes in left APB and FDI which may be due to underlying peripheral neuropathy and/or left median neuropathy.   There is no electrodiagnostic evidence of a left cervical radiculopathy      ___________________________  Mia Castaneda M.D.      Nerve Conduction Studies  Anti Sensory Summary Table     Stim Site NR Peak (ms) Norm Peak (ms) P-T Amp (µV) Norm P-T Amp Site1 Site2 Dist (cm)   Left Median Anti Sensory (2nd Digit)  31.9°C   Wrist    3.8 <4 7.4 >13 Wrist 2nd Digit 14.0   Elbow    4.1  5.8  Elbow Wrist 0.0   Left Radial Anti Sensory (Base 1st Digit)  32.8°C   Wrist    2.0 <2.8 8.2 >11 Wrist Base 1st Digit 10.0   Site 2    2.1  13.5           2.1  17.3       Left Sup Fibular Anti Sensory (Lat ankle)  26.4°C   Lower leg NR  <4.6  >4 Lower leg Lat ankle 10.0   Right Sup Fibular Anti Sensory (Lat ankle)  29.8°C   Lower leg NR  <4.6  >4 Lower leg Lat ankle 10.0   Left Sural Anti Sensory (Lat Mall)  26.9°C   Calf NR  <4.5  >4.0 Calf Lat Mall 14.0   Right Sural Anti Sensory (Lat Mall)  30.2°C   Calf NR  <4.5  >4.0 Calf Lat Mall 14.0   Left Ulnar Anti Sensory (5th Digit)  32.6°C   Wrist    3.1 <4.0 21.6 >9 Wrist 5th Digit 14.0   B Elbow    3.0  22.6  B Elbow Wrist 0.0     Motor Summary Table     Stim Site NR Onset (ms) Norm Onset (ms) O-P Amp (mV) Norm O-P Amp Amp (Prev) (%) Site1 Site2 Dist (cm) Darnell (m/s) Norm Darnell (m/s)   Left Fibular Motor (Ext Dig Brev)  26.3°C   Ankle    6.9 <6.5 0.2 >1.1 100.0 Ankle Ext Dig Brev 8.0     B Fib    13.0  0.4  200.0 B Fib Ankle 30.0 49 >38   Poplt    22.7  0.4  100.0 Poplt B Fib 10.0 10 >42   Right Fibular Motor (Ext Dig Brev) 29.5°C   Ankle    5.5 <6.5 0.2 >1.1 100.0 Ankle Ext Dig Brev 8.0     B Fib    13.4  0.3  150.0 B Fib Ankle 31.0 39 >38   Poplt    14.5  0.4  133.3 Poplt B Fib 10.0 91 >42   Left Fibular TA Motor (Tib Ant)  26.2°C   Fib Head    2.8 <4.5 5.8 >3.0 100.0 Fib Head Tib Ant 10.0     Poplit    4.1  6.0  103.4 Poplit Fib Head 10.0 77 >40   Right Fibular TA Motor (Tib Ant)  28.7°C   Fib Head    3.0 <4.5 6.5 >3.0 100.0 Fib Head Tib Ant 10.0     Poplit    4.4  6.5  100.0 Poplit Fib Head 10.0 71 >40   Left Median Motor (Abd Poll Brev)  32.8°C   Wrist    3.7 <4.5 7.4 >4.1 100.0 Wrist Abd Poll Brev 8.0     Elbow    7.7  6.5  87.8 Elbow Wrist 18.0 45 >49   Left Tibial Motor (Abd Yung Brev)  26.2°C   Ankle    4.3 <6.1 4.9 >1.1 100.0 Ankle Abd Yung Brev 8.0     Knee    13.7  5.0  102.0 Knee Ankle 34.0 36 >39   Right Tibial Motor (Abd Yung Brev)  29.1°C   Ankle    4.4 <6.1 5.3 >1.1 100.0 Ankle Abd Yung Brev 8.0     Knee    14.0  3.0  56.6 Knee Ankle 34.0 35 >39   Left Ulnar Motor (Abd Dig Minimi)  32.3°C   Wrist    2.8 <3.1 7.7 >7.0 100.0 Wrist Abd Dig Minimi 8.0  >50   B Elbow    6.3  6.5  84.4 B Elbow Wrist 18.0 51 >50   A Elbow    8.0  6.3  96.9 A Elbow B Elbow 10.0 59 >50     Comparison Summary Table     Stim Site NR Peak (ms) P-T Amp (µV) Site1 Site2 Dist (cm) Delta-0 (ms)   Left Median/Ulnar Palm Comparison (Wrist)  32.5°C   Median Palm    2.2 6.3 Median Palm Ulnar Palm 8.0 0.6   Ulnar Palm    1.9 18.2         F Wave Studies     NR F-Lat (ms) Lat Norm (ms) L-R F-Lat (ms) L-R Lat Norm   Left Ulnar (Mrkrs) (Abd Dig Min)  32.5°C      26.04 <32  <2.5     H Reflex Studies     NR H-Lat (ms) L-R H-Lat (ms) L-R Lat Norm   Left Tibial (Gastroc)  26.2°C      30.49 2.43 <2.0   Right Tibial (Gastroc)  28.8°C      32.92 2.43 <2.0     EMG     Side Muscle Nerve Root Ins Act Fibs Psw Recrt Duration Amp Poly Comment   Left Abd Poll Brev Median C8-T1 Nml Nml Nml Reduced Nml Incr Nml mild reduced   Left 1stDorInt Ulnar C8-T1 Nml Nml Nml Reduced Nml Incr Nml mild reduced   Left ExtIndicis Radial (Post Int) C7-8 Nml Nml Nml Nml Nml Nml Nml    Left PronatorTeres Median C6-7 Nml Nml Nml Nml Nml Nml Nml    Left Triceps Radial C6-7-8 Nml Nml Nml Nml Nml Nml Nml    Left Deltoid Axillary C5-6 Nml Nml Nml Nml Nml Nml Nml    Left Lower Cerv Parasp Rami C7,T1 Nml Nml Nml Nml Nml Nml Nml    Left Ext Dig Brev Dp Br Peron L5, S1 Nml Nml Nml Reduced Nml Incr Nml    Left MedGastroc Tibial S1-2 Nml Nml Nml Nml Nml Nml Nml    Left AntTibialis Dp Br Peron L4-5 Nml Nml Nml Nml Nml Nml Nml    Left VastusMed Femoral L2-4 Nml Nml Nml Nml Nml Incr Nml    Left GluteusMax InfGluteal L5-S2 Nml Nml Nml Nml Nml Nml Nml    Left GluteusMed SupGluteal L4-S1 Nml Nml Nml Nml Nml Nml Nml    Left Lower Lumb Parasp Rami L5,S1 Nml Nml Nml Nml Nml Nml Nml    Right Ext Dig Brev Dp Br Peron L5, S1 Nml Nml Nml Reduced Nml Incr Nml    Right MedGastroc Tibial S1-2 Incr Nml 2+ Nml Nml Incr Nml    Right AntTibialis Dp Br Peron L4-5 Nml Nml Nml Nml Nml Nml Nml    Right VastusMed Femoral L2-4 Nml Nml Nml Nml Nml Incr Nml    Right GluteusMax InfGluteal L5-S2 Nml 1+ Nml Nml Nml Nml Nml    Right GluteusMed SupGluteal L4-S1 Nml Nml Nml Nml Nml Nml Nml    Right Lower Lumb Parasp Rami L5,S1 Incr Nml 2+ Nml Nml Nml Nml      Waveforms:

## 2021-01-11 NOTE — PROGRESS NOTES
Percy Cisneros (: 1950) is a 79 y.o. female, established patient, here for evaluation of the following chief complaint(s):  No chief complaint on file. ASSESSMENT/PLAN:  1. Sensorimotor neuropathy    2. Right lumbar radiculopathy  -     MRI LUMB SPINE WO CONT; Future    3. Lumbosacral radiculopathy at S1    4. Mild carpal tunnel syndrome, left      Ordered MRI lumbar spine without contrast to evaluate for cause of right lumbar radiculopathy  Follow-up after MRI is done to go over results and finalize planning on lumbar epidural steroid injection      SUBJECTIVE/OBJECTIVE:  HPI      Pt presented for EMG/ NCS testing regarding hx of idiopathic peripheral neuropathy (pt reports she has been pre-DM for many years, blood sugars controlled with diet and metformin/ Glucophage). Discussed with her that EMG shows right lumbar radiculopathy as it had in the past, and but the remainder of the test is not interpreted yes (will go over at another visit). She has reported significant frequent pain rating down her right leg. She says she has tried physical therapy in the past no prolonged relief. On questioning she reports her last MRI lumbar spine was maybe 2 years ago. We discussed treatment options including possible lumbar epidural steroid injection. She says that she would like to start getting treated for the pain as it interferes with her daily function. Discussed with her that she would have to have an updated MRI of her lumbar spine to evaluate for structural abnormalities, prior to doing any lumbar spine injection. She requested that I order that test.     Review of Systems    Physical Exam    Physical exam performed at today's visit. General patient is awake alert oriented and conversant, following all commands, moving all extremities. without difficulty        An electronic signature was used to authenticate this note.   -- Stacey Arias MD

## 2021-01-11 NOTE — LETTER
1/11/2021 11:26 AM 
 
Ms. Percy Davis [address] [address] Hold prior to testing Losartan-1 day 
lexapro-7 days Metoprolol-1 day 
cymbalta-3 days 
  
                     Are there other special preparations? 1. If your test is in the morning do not eat food for 12 hours before the test. 
2. If your test is in the afternoon eat a light breakfast four to six hours before the test. 
3. Avoid alcohol caffeine or nicotine 12 hours before tests. 4. Avoid vigorous exercise for 24 hours 5. Wear soft non-restrictive clothing. Avoid clothing that may restrict blood flow including stockings and corsets. Remove elastic stockings. NO sports bra. 6. Avoid stressful circumstances. Your test will be more successful if you are rested and relaxed. 7. Arrive early 8. Please remove all jewelry prior to testing 9. Do Not engage in discussion with the  during the test because this may affect your autonomic reflexes. 10. Evacuate your bladder and preferably bowel before the test. 
11. Only the patient is allowed in the procedure room during the testing. If you have someone bring you please let them know they will not be able to come back with you. 12. No lotions or perfumes on the skin 13. If you have hair on arms, legs, chest or back, for testing purposes the tech may need to shave these  areas. All females please shave legs the day before testing. 14. Testing last approximately 1 1/2 hrs to 2 hrs.  
        
  
Rafy Martínez  [facility] 
[phone]

## 2021-01-21 ENCOUNTER — OFFICE VISIT (OUTPATIENT)
Dept: NEUROLOGY | Age: 71
End: 2021-01-21

## 2021-01-21 DIAGNOSIS — G62.89 DISEASE RELATED PERIPHERAL NEUROPATHY: Primary | ICD-10-CM

## 2021-01-21 NOTE — PROCEDURES
[facility]  [facility] [address], [address] [place], [address] [facility]  Phone: [phone]  FAX: [phone]     Clinical Autonomic Testing Report     Patient ID:  Gina Jenkins  [id number]  79 y.o.  1950     REFERRED BY: Jose Edouard MD  PCP: Amparo Madison NP    Date of Testin2021     Indication/History:     6 yrs of numbness in both legs. (+) diabetes    Patient is coming for autonomic dysfunction evaluation. Medications taken 48 hrs before the test: Lexapro (only stopped for 1 day)     Procedure: This Autonomic Nervous System (ANS) testing is performed by utilizing [address] [place] [address] [employer] Autonomic System, with established protocol. Multiple procedures performed: Heart rate response to deep breathing (HRDB), Valsalva ratio, Heart rate and BP response to head up tilt (HUT), and Quantitative sudomotor axon reflex testing (QSWEAT) . Result:  1. Heart response to deep  breathing (HRDB): 2 series of 6 cycles were performed and the mean of 6 consecutive cycles was obtained. Average HR difference was 7.9 and E:I ratio was 1.12. Normal response    2. Heart rate response to Valsalva maneuver:  [id number] BP to Valsalva was measured and BP response in all 4 phases was normal. Heart response was the opposite of BP, a normal response. ( VR = 1.44 )  3. HUT (head-up tilt) : [id number] BP and HR were measured, up to 15 minutes post tilt. No significant BP reduction or HR acceleration/deceleration. 4. SUDOMOTOR: QSWEAT response showed significantly reduced sweat production in the proximal leg, distal leg and foot, comparing patient to age group. Impression:   ABNORMAL    1) Reduced sweat production in the proximal leg, distal leg and foot, which can be seen in length dependent polyneuropathy. However medication effect (I.e Lexapro) cannot be excluded. If clinically indicated, patient may come back for repeat QSWEAT testing 7 days off said medication.     2) Cardiovascular autonomic portion is within normal limits with no evidence of orthostatic hypotension or significant tachycardia.          Destiny Aranda MD  Diplomate, American Board of Psychiatry and Neurology  Diplomate, Neuromuscular Medicine  Diplomate, American Board of Electrodiagnostic Medicine    Note: Raw Data will be scanned separately in Media

## 2021-01-21 NOTE — LETTER
2021 2:00 PM    Patient:  Dion Schulz   YOB: 1950  Date of Visit: 2021      Dear Lucero Rodriguez NP  [address] [id number]  Via Fax: [phone]: Thank you for referring Ms. Percy Cisneros to me for ANS testing. 3 [facility]  [address] [place], [address] [facility]  Phone: [phone]  FAX: [phone]     Clinical Autonomic Testing Report     Patient ID:  Dion Schulz  [id number]  79 y.o.  1950     REFERRED BY: Hussain Michael MD  PCP: Andrzej Richardson NP    Date of Testin2021     Indication/History:     6 yrs of numbness in both legs. (+) diabetes    Patient is coming for autonomic dysfunction evaluation. Medications taken 48 hrs before the test: Lexapro (only stopped for 1 day)     Procedure: This Autonomic Nervous System (ANS) testing is performed by utilizing [address] [place] [address] [employer] Autonomic System, with established protocol. Multiple procedures performed: Heart rate response to deep breathing (HRDB), Valsalva ratio, Heart rate and BP response to head up tilt (HUT), and Quantitative sudomotor axon reflex testing (QSWEAT) . Result:  1. Heart response to deep  breathing (HRDB): 2 series of 6 cycles were performed and the mean of 6 consecutive cycles was obtained. Average HR difference was 7.9 and E:I ratio was 1.12. Normal response    2. Heart rate response to Valsalva maneuver:  [id number] BP to Valsalva was measured and BP response in all 4 phases was normal. Heart response was the opposite of BP, a normal response. ( VR = 1.44 )  3. HUT (head-up tilt) : [id number] BP and HR were measured, up to 15 minutes post tilt. No significant BP reduction or HR acceleration/deceleration. 4. SUDOMOTOR: QSWEAT response showed significantly reduced sweat production in the proximal leg, distal leg and foot, comparing patient to age group.      Impression:   ABNORMAL    1) Reduced sweat production in the proximal leg, distal leg and foot, which can be seen in length dependent polyneuropathy. However medication effect (I.e Lexapro) cannot be excluded. If clinically indicated, patient may come back for repeat QSWEAT testing 7 days off said medication. 2) Cardiovascular autonomic portion is within normal limits with no evidence of orthostatic hypotension or significant tachycardia.          Tevin Finley MD  Diplomate, American Board of Psychiatry and Neurology  Diplomate, Neuromuscular Medicine  Diplomate, American Board of Electrodiagnostic Medicine    Note: Raw Data will be scanned separately in Media

## 2021-01-26 ENCOUNTER — HOSPITAL ENCOUNTER (OUTPATIENT)
Dept: MRI IMAGING | Age: 71
Discharge: HOME | End: 2021-01-26
Attending: PSYCHIATRY & NEUROLOGY
Payer: MEDICARE

## 2021-01-26 DIAGNOSIS — M54.16 RIGHT LUMBAR RADICULOPATHY: ICD-10-CM

## 2021-01-26 PROCEDURE — 72148 MRI LUMBAR SPINE W/O DYE: CPT

## 2021-02-11 ENCOUNTER — VIRTUAL VISIT (OUTPATIENT)
Dept: NEUROLOGY | Age: 71
End: 2021-02-11
Payer: MEDICARE

## 2021-02-11 DIAGNOSIS — R20.2 PARESTHESIA OF SKIN: ICD-10-CM

## 2021-02-11 DIAGNOSIS — M54.16 RIGHT LUMBAR RADICULOPATHY: ICD-10-CM

## 2021-02-11 DIAGNOSIS — G62.9 SENSORIMOTOR NEUROPATHY: Primary | ICD-10-CM

## 2021-02-11 PROCEDURE — 99214 OFFICE O/P EST MOD 30 MIN: CPT | Performed by: PSYCHIATRY & NEUROLOGY

## 2021-02-11 NOTE — PROGRESS NOTES
Percy Cisneros (: 1950) is a 79 y.o. female, established patient, here for evaluation of the following chief complaint(s):   Follow-up (test results )       ASSESSMENT/PLAN:  1. Sensorimotor neuropathy  -     VITAMIN B12; Future  -     METHYLMALONIC ACID; Future  -     SPEP AND SHAYY, SERUM; Future  -     GLUCOSE TOLERANCE, 2 HR; Future    2. Right lumbar radiculopathy    3. Paresthesia of skin   -     VITAMIN B12; Future      No findings on ANS testing of autonomic neuropathy (was dx as having dysautonomia in the late  by Cardiology based on abnormal tilt-table test at that time). Does have an idiopathic peripheral neuropathy. As she has not been dx as DM in the past, will check labwork for other causes of peripheral neuropathy (B12, MMA, SPEP, 2 hr GTT). Nurse to mail copy of lab orders to patient who will complete. Pt wants to complete labs work then follow up review it and to decide on pursuing lumbar VAIBHAV for her persisting radicular symptoms (right leg, L5 pattern/ dermatome). Follow up to go over labs, discuss setting up Lumbar VAIBHAV      SUBJECTIVE/OBJECTIVE:  HPI     Following up to go over EMG results, ANS testing, MRI Lumbar spine findings    EM) chronic right S1 lumbar radiculopathy 2) No electrodiagnostic evidence of left lumbar radiculopathy, 3) Symmetric sensorimotor peripheral neuropathy, 4) mild left median neuropathy (ie CTS), 5) no evidence of left ulnar neuropathy or left cervical radiculopathy. ANS testing: ABNORMAL. 1) Reduced sweat production in the proximal leg, distal leg and foot, which can be seen in length dependent polyneuropathy. However medication effect (I.e Lexapro) cannot be excluded. If clinically indicated, patient may come back for repeat QSWEAT testing 7 days off said medication. 2) Cardiovascular autonomic portion is within normal limits with no evidence of orthostatic hypotension or significant tachycardia.     MRI L-spine:  unchanged grade 1 anterolisthesis of L4 on L5 with minimal spinal stenosis and mild bilateral neural foraminal narrowing. Remaining levels are normal (L3-4 facet joints have mild facet arthropathy). Discussed all of the above with patient. She continues to report pain radiating from back to side of right thigh, side of right lower leg. D/w her this is more consistent with L5 nerve being affected than L4 nerve (her disc changes are at L4-5 level). Her EMG showed chronic right S1 radiculopathy. Has constant paresthesias in feet (both). Knows she has a peripheral neuropathy, but is not diabetic. D/w her ANS testing showed reduced sweat production in leg(s) consistent with a peripheral neuropathy vs due to Lexapro (med side effect). Discussed holding her Lexapro x 7 days and repeating only the Sweat testing but pt says she can't do it, will have withdrawal if she stops her Lexapro. D/w her that I don't think we need to do that because we know she has peripheral neuropathy (idiopathic). We discussed doing additional lab testing to look for other/ non-diabetic causes of peripheral neuropathy. She was interested in pursuing that. Review of Systems     Allergies   Allergen Reactions    Lactose Diarrhea and Nausea Only    Sulfa (Sulfonamide Antibiotics) Hives     Current Outpatient Medications   Medication Sig Dispense Refill    fenofibrate nanocrystallized (TRICOR) 145 mg tablet Take 1 Tab by mouth daily. 90 Tab 1    metoprolol succinate (TOPROL-XL) 25 mg XL tablet Take 1 Tab by mouth daily. 90 Tab 1    losartan (COZAAR) 50 mg tablet TAKE 1 TABLET DAILY 90 Tab 1    escitalopram oxalate (LEXAPRO) 20 mg tablet       acetaminophen (TYLENOL) 500 mg tablet Take 500 mg by mouth as needed.  atorvastatin (LIPITOR) 10 mg tablet TAKE 1 TABLET NIGHTLY 90 Tab 0    metFORMIN (GLUCOPHAGE) 1,000 mg tablet Take 1 Tab by mouth two (2) times daily (with meals).  180 Tab 3    aspirin delayed-release 81 mg tablet Take 81 mg by mouth nightly.  levothyroxine (SYNTHROID) 137 mcg tablet Take 137 mcg by mouth Daily (before breakfast).  cholecalciferol, VITAMIN D3, (VITAMIN D3) 5,000 unit tab tablet Take 5,000 Units by mouth daily.  raloxifene (EVISTA) 60 mg tablet Take 60 mg by mouth daily.  CYANOCOBALAMIN (VITAMIN B-12 IJ) by Injection route every fourteen (14) days. SQ       Past Medical History:   Diagnosis Date    Autonomic dysfunction 12/4/98    tilt test with marked heart rate variablility and drop in BP without hemodynamic collapse    Cancer ([facility] [address] 1997    left breast lumpectomy , Rx RT    Diabetes ([facility] [address]     pre-diabetic, on metformin    Dilated cardiomyopathy ([facility] [address] 2/8/06    nonischemic    Elevated triglycerides with high cholesterol 11/21/2014    Essential hypertension 2/9/2017    Hx MRSA infection 3/26/2018    [facility] Hypothyroidism 1994    Dr. Walker Escoto.    Ill-defined condition     high cholesterol    Insulin resistance 11/21/2014    Mixed hyperlipidemia with apolipoprotein E2 variant 11/21/2014    Nausea & vomiting     Osteoarthritis     diffuse. Dr. Sanchez Gains Other and unspecified hyperlipidemia 12/21/2010    PONV (postoperative nausea and vomiting)     Psychiatric disorder     anxiety    PVC's 1994    Sleep apnea     Not wearing CPAP    Vitamin D deficiency 11/21/2014       No flowsheet data found. Physical Exam              Dedious A Eshabert Reasons is being evaluated by a Virtual Visit (video visit) encounter to address concerns as mentioned above. A caregiver was present when appropriate. Due to this being a TeleHealth encounter (During KUPYQ-49 public health emergency), evaluation of the following organ systems was limited: Vitals/Constitutional/EENT/Resp/CV/GI//MS/Neuro/Skin/Heme-Lymph-Imm.   Pursuant to the emergency declaration under the [facility], [address] [place] [address] authority and the [employer] General Act, this Virtual Visit was conducted with patient's (and/or legal guardian's) consent, to reduce the patient's risk of exposure to COVID-19 and provide necessary medical care. The patient (and/or legal guardian) has also been advised to contact this office for worsening conditions or problems, and seek emergency medical treatment and/or call 911 if deemed necessary. Patient identification was verified at the start of the visit: YES    Services were provided through a video synchronous discussion virtually to substitute for in-person clinic visit. Patient was located at home and provider was located in office or at home. An electronic signature was used to authenticate this note.   -- Jose Holt MD

## 2021-02-12 ENCOUNTER — PATIENT MESSAGE (OUTPATIENT)
Dept: NEUROLOGY | Age: 71
End: 2021-02-12

## 2021-03-02 DIAGNOSIS — E78.2 ELEVATED TRIGLYCERIDES WITH HIGH CHOLESTEROL: ICD-10-CM

## 2021-03-02 DIAGNOSIS — I42.8 OTHER CARDIOMYOPATHY (HCC): ICD-10-CM

## 2021-03-02 DIAGNOSIS — I10 ESSENTIAL HYPERTENSION: ICD-10-CM

## 2021-03-02 DIAGNOSIS — I49.3 PVC (PREMATURE VENTRICULAR CONTRACTION): ICD-10-CM

## 2021-03-02 LAB
ALBUMIN SERPL ELPH-MCNC: 3.7 G/DL (ref 2.9–4.4)
ALBUMIN/GLOB SERPL: 1.2 {RATIO} (ref 0.7–1.7)
ALPHA1 GLOB SERPL ELPH-MCNC: 0.3 G/DL (ref 0–0.4)
ALPHA2 GLOB SERPL ELPH-MCNC: 0.7 G/DL (ref 0.4–1)
B-GLOBULIN SERPL ELPH-MCNC: 1.2 G/DL (ref 0.7–1.3)
GAMMA GLOB SERPL ELPH-MCNC: 1.1 G/DL (ref 0.4–1.8)
GLOBULIN SER-MCNC: 3.3 G/DL (ref 2.2–3.9)
GLUCOSE 2H P 75 G GLC PO SERPL-MCNC: 239 MG/DL (ref 65–139)
GLUCOSE P FAST SERPL-MCNC: 97 MG/DL (ref 65–99)
IGA SERPL-MCNC: 113 MG/DL (ref 87–352)
IGG SERPL-MCNC: 1046 MG/DL (ref 586–1602)
IGM SERPL-MCNC: 45 MG/DL (ref 26–217)
INTERPRETATION SERPL IEP-IMP: NORMAL
Lab: NORMAL
M PROTEIN SERPL ELPH-MCNC: NORMAL G/DL
METHYLMALONATE SERPL-SCNC: 357 NMOL/L (ref 0–378)
PLEASE NOTE:, 149534: NORMAL
PROT SERPL-MCNC: 7 G/DL (ref 6–8.5)
VIT B12 SERPL-MCNC: 336 PG/ML (ref 232–1245)

## 2021-03-02 RX ORDER — METFORMIN HYDROCHLORIDE 1000 MG/1
1000 TABLET ORAL 2 TIMES DAILY WITH MEALS
Qty: 180 TAB | Refills: 3 | Status: CANCELLED | OUTPATIENT
Start: 2021-03-02

## 2021-03-02 RX ORDER — METOPROLOL SUCCINATE 25 MG/1
25 TABLET, EXTENDED RELEASE ORAL DAILY
Qty: 90 TAB | Refills: 1 | Status: CANCELLED | OUTPATIENT
Start: 2021-03-02

## 2021-03-03 RX ORDER — FENOFIBRATE 145 MG/1
145 TABLET, COATED ORAL DAILY
Qty: 90 TAB | Refills: 0 | Status: SHIPPED | OUTPATIENT
Start: 2021-03-03 | End: 2021-09-25 | Stop reason: SDUPTHER

## 2021-03-03 RX ORDER — METOPROLOL SUCCINATE 25 MG/1
25 TABLET, EXTENDED RELEASE ORAL DAILY
Qty: 90 TAB | Refills: 0 | Status: SHIPPED | OUTPATIENT
Start: 2021-03-03 | End: 2021-03-18 | Stop reason: SDUPTHER

## 2021-03-03 RX ORDER — LOSARTAN POTASSIUM 50 MG/1
TABLET ORAL
Qty: 90 TAB | Refills: 0 | Status: SHIPPED | OUTPATIENT
Start: 2021-03-03 | End: 2021-09-16 | Stop reason: SDUPTHER

## 2021-03-05 LAB
GLUCOSE 2H P 75 G GLC PO SERPL-MCNC: 190 MG/DL (ref 65–139)
GLUCOSE P FAST SERPL-MCNC: 97 MG/DL (ref 65–99)

## 2021-03-11 ENCOUNTER — OFFICE VISIT (OUTPATIENT)
Dept: NEUROLOGY | Age: 71
End: 2021-03-11
Payer: MEDICARE

## 2021-03-11 VITALS
BODY MASS INDEX: 30.73 KG/M2 | HEIGHT: 64 IN | RESPIRATION RATE: 16 BRPM | HEART RATE: 69 BPM | DIASTOLIC BLOOD PRESSURE: 80 MMHG | TEMPERATURE: 97.7 F | OXYGEN SATURATION: 98 % | SYSTOLIC BLOOD PRESSURE: 134 MMHG | WEIGHT: 180 LBS

## 2021-03-11 DIAGNOSIS — G62.9 SENSORIMOTOR NEUROPATHY: Primary | ICD-10-CM

## 2021-03-11 PROCEDURE — G8417 CALC BMI ABV UP PARAM F/U: HCPCS | Performed by: PSYCHIATRY & NEUROLOGY

## 2021-03-11 PROCEDURE — G8432 DEP SCR NOT DOC, RNG: HCPCS | Performed by: PSYCHIATRY & NEUROLOGY

## 2021-03-11 PROCEDURE — 1090F PRES/ABSN URINE INCON ASSESS: CPT | Performed by: PSYCHIATRY & NEUROLOGY

## 2021-03-11 PROCEDURE — 1101F PT FALLS ASSESS-DOCD LE1/YR: CPT | Performed by: PSYCHIATRY & NEUROLOGY

## 2021-03-11 PROCEDURE — G8428 CUR MEDS NOT DOCUMENT: HCPCS | Performed by: PSYCHIATRY & NEUROLOGY

## 2021-03-11 PROCEDURE — 99214 OFFICE O/P EST MOD 30 MIN: CPT | Performed by: PSYCHIATRY & NEUROLOGY

## 2021-03-11 PROCEDURE — G8536 NO DOC ELDER MAL SCRN: HCPCS | Performed by: PSYCHIATRY & NEUROLOGY

## 2021-03-11 PROCEDURE — 3017F COLORECTAL CA SCREEN DOC REV: CPT | Performed by: PSYCHIATRY & NEUROLOGY

## 2021-03-11 PROCEDURE — G8752 SYS BP LESS 140: HCPCS | Performed by: PSYCHIATRY & NEUROLOGY

## 2021-03-11 PROCEDURE — G8399 PT W/DXA RESULTS DOCUMENT: HCPCS | Performed by: PSYCHIATRY & NEUROLOGY

## 2021-03-11 PROCEDURE — G8754 DIAS BP LESS 90: HCPCS | Performed by: PSYCHIATRY & NEUROLOGY

## 2021-03-11 RX ORDER — VENLAFAXINE 25 MG/1
25 TABLET ORAL EVERY MORNING
Qty: 30 TAB | Refills: 1 | Status: SHIPPED | OUTPATIENT
Start: 2021-03-11 | End: 2021-05-18

## 2021-03-11 NOTE — PROGRESS NOTES
Percy Cisneros (1950) is a 70 y.o. female, established patient, here for evaluation of the following     ASSESSMENT/ PLAN:     ICD-10-CM ICD-9-CM    1. Sensorimotor neuropathy  G62.9 356.9 venlafaxine (EFFEXOR) 25 mg tablet       Forwarding most recent to patient's endocrinologist for his review. Discussed with patient if she would like to try Effexor for neuropathy symptoms. She wants to do that so sent in a prescription for Effexor 25 mg/day. If she finds it helpful, she will send me a [employer] message either requesting a refill or a higher dose. If she doesn't find if helpful or any side effects to it, she will stop taking it and no further neurology visits will be needed. If she chooses to continue taking, she will make a follow-up visit with me in a few months.         ========================================    SUBJECTIVE/ OBJECTIVE:    Chief complaint(s):     Chief Complaint   Patient presents with    Neuropathy     has good and bad days     Results         HPI: 70 y.o. female with hx of idiopathic peripheral neuropathy, long-standing hx of Pre-DM      Following up to go over lab results re neuropathy symptoms    2-: SPEP normal, B12 336, Methylmalonic acid 357, \"1 hour\" glucose tolerance test (initial glucose 97/ normal, 1 hour glucose 239)    [id number]: Repeat Glucose Tolerance Test (truly a 2 hr GTT): initial glucose 97/ normal, 2 hour glucose 190/ abnormal (ref range )      Reviewed the above. Normal except for the glucose tolerance test 1 and 2 are normal which implies impaired glucose tolerance, consistent with her known history of prediabetes. Discussed with her that the only other test being normal that her neuropathy symptoms/paresthesias are due to the longstanding prediabetes. Continues to have paresthesias in her feet tightness throughout her body.     Tried/failed: Gabapentin (side effects), Cymbalta (side effects), Lyrica 100 mg BID (pt didn't want to go higher, d/c'd). Review of Systems: as above    ========================================    Brief Hx:     See initial visit 2014 for full hx     MRI L-spine (10-6-14): Minimal disc and facet degenerative change with minimal anterolisthesis of L4 on L5. Other levels normal.  The spinal canal and foramina are widely patent. MRI C-spine (10-): Pain in both arms: multi-level cervical spondylosis (facet arthropathy, disc bulges, bone spurs, varying degrees of foraminal stenosis, mild-moderate spinal canal stenosis at C3-4, other levels with mild or no spinal canal stenosis, no spinal cord abnormalities). MRI L-spine (2018) Right leg radicular pain: Transitional lumbosacral segment which will be deemed L5 to be consistent with prior study from . Mild anterolisthesis of L4 and L5 slightly increased since previous study. Minimal central canal narrowing at L4-L5 perhaps subtly increased since prior study. Minimal to mild bilateral foraminal narrowing at L4-L5 slightly greater on the right. MRI L-spine (2021) Right leg radicular pain: Unchanged grade 1 anterolisthesis of L4 on L5 with minimal spinal stenosis and mild bilateral neural foraminal narrowing. Remaining levels are normal (L3-4 facet joints have mild facet arthropathy). EMG (2014): No cervical radiculopathy in either upper extremity, normal bilateral median and ulnar motor responses; technical difficulty in obtaining median and ulnar sensory responses. NCS of upper extremities was repeated on 14. NCS (14): mild CTS bilateral, affecting sensory fibers only; no evidence of ulnar neuropathy in either upper extremity    See New Patient Visit on 18 (re-established care) for full hx    EM18: 1) Right S1 lumbar radiculopathy.   2) mild sensory motor peripheral neuropathy    EM2021: 1) chronic right S1 lumbar radiculopathy 2) No electrodiagnostic evidence of left lumbar radiculopathy, 3) Symmetric sensorimotor peripheral neuropathy, 4) mild left median neuropathy (ie CTS), 5) no evidence of left ulnar neuropathy or left cervical radiculopathy. ANS testing (1-): ABNORMAL. 1) Reduced sweat production in the proximal leg, distal leg and foot, which can be seen in length dependent polyneuropathy. However medication effect (I.e Lexapro) cannot be excluded. If clinically indicated, patient may come back for repeat QSWEAT testing 7 days off said medication. 2) Cardiovascular autonomic portion is within normal limits with no evidence of orthostatic hypotension or significant tachycardia. No findings on ANS testing of autonomic neuropathy (was dx as having dysautonomia in the late 1990s by Cardiology based on abnormal tilt-table test at that time). Does have an idiopathic peripheral neuropathy. As she has not been dx as DM (has pre-DM, reports blood sugars have been very well controlled on Metformin), will check labwork for other causes of peripheral neuropathy (B12, MMA, SPEP, 2 hr GTT). Allergies   Allergen Reactions    Lactose Diarrhea and Nausea Only    Sulfa (Sulfonamide Antibiotics) Hives       Current Outpatient Medications   Medication Sig Dispense Refill    venlafaxine (EFFEXOR) 25 mg tablet Take 1 Tab by mouth Every morning. Anti-depressant; for neuropathy pain 30 Tab 1    metoprolol succinate (TOPROL-XL) 25 mg XL tablet Take 1 Tab by mouth daily. 90 Tab 0    fenofibrate nanocrystallized (TRICOR) 145 mg tablet Take 1 Tab by mouth daily. 90 Tab 0    losartan (COZAAR) 50 mg tablet TAKE 1 TABLET DAILY 90 Tab 0    escitalopram oxalate (LEXAPRO) 20 mg tablet       acetaminophen (TYLENOL) 500 mg tablet Take 500 mg by mouth as needed.  metFORMIN (GLUCOPHAGE) 1,000 mg tablet Take 1 Tab by mouth two (2) times daily (with meals). 180 Tab 3    aspirin delayed-release 81 mg tablet Take 81 mg by mouth nightly.       levothyroxine (SYNTHROID) 137 mcg tablet Take 137 mcg by mouth Daily (before breakfast).  cholecalciferol, VITAMIN D3, (VITAMIN D3) 5,000 unit tab tablet Take 5,000 Units by mouth daily.  raloxifene (EVISTA) 60 mg tablet Take 60 mg by mouth daily.  CYANOCOBALAMIN (VITAMIN B-12 IJ) by Injection route every fourteen (14) days. SQ      atorvastatin (LIPITOR) 10 mg tablet TAKE 1 TABLET NIGHTLY 90 Tab 0       Past Medical History:   Diagnosis Date    Autonomic dysfunction 12/4/98    tilt test with marked heart rate variablility and drop in BP without hemodynamic collapse    Cancer ([facility] [address] 1997    left breast lumpectomy , Rx RT    Diabetes ([facility] [address]     pre-diabetic, on metformin    Dilated cardiomyopathy ([facility] [address] 2/8/06    nonischemic    Elevated triglycerides with high cholesterol 11/21/2014    Essential hypertension 2/9/2017    Hx MRSA infection 3/26/2018    [facility] Hypothyroidism 1994    Dr. Beronica Leslie.   [facility] Ill-defined condition     high cholesterol    Insulin resistance 11/21/2014    Mixed hyperlipidemia with apolipoprotein E2 variant 11/21/2014    Nausea & vomiting     Osteoarthritis     diffuse. Dr. Dom Whiting Other and unspecified hyperlipidemia 12/21/2010    PONV (postoperative nausea and vomiting)     Psychiatric disorder     anxiety    PVC's 1994    Sleep apnea     Not wearing CPAP    Vitamin D deficiency 11/21/2014       Physical Exam:    Vitals:    03/11/21 1112   BP: 134/80   BP 1 Location: Right arm   BP Patient Position: Sitting   Pulse: 69   Resp: 16   Temp: 97.7 °F (36.5 °C)   SpO2: 98%   Weight: 81.6 kg (180 lb)   Height: 5' 4\" (1.626 m)       Entirety of the visit was spent discussing lab results, pt's ongoing neuropathy symptoms, other treatment options          An electronic signature was used to authenticate this note.   -- Jose Edouard MD

## 2021-03-16 ENCOUNTER — OFFICE VISIT (OUTPATIENT)
Dept: CARDIOLOGY CLINIC | Age: 71
End: 2021-03-16
Payer: MEDICARE

## 2021-03-16 VITALS
HEIGHT: 64 IN | DIASTOLIC BLOOD PRESSURE: 76 MMHG | OXYGEN SATURATION: 98 % | BODY MASS INDEX: 31.99 KG/M2 | WEIGHT: 187.4 LBS | HEART RATE: 78 BPM | SYSTOLIC BLOOD PRESSURE: 132 MMHG

## 2021-03-16 DIAGNOSIS — I49.3 PVC (PREMATURE VENTRICULAR CONTRACTION): ICD-10-CM

## 2021-03-16 DIAGNOSIS — E78.5 DYSLIPIDEMIA: ICD-10-CM

## 2021-03-16 DIAGNOSIS — Z86.79 HX OF CARDIOMYOPATHY: ICD-10-CM

## 2021-03-16 DIAGNOSIS — I10 ESSENTIAL (PRIMARY) HYPERTENSION: Primary | ICD-10-CM

## 2021-03-16 PROCEDURE — 3017F COLORECTAL CA SCREEN DOC REV: CPT | Performed by: INTERNAL MEDICINE

## 2021-03-16 PROCEDURE — G8427 DOCREV CUR MEDS BY ELIG CLIN: HCPCS | Performed by: INTERNAL MEDICINE

## 2021-03-16 PROCEDURE — 99214 OFFICE O/P EST MOD 30 MIN: CPT | Performed by: INTERNAL MEDICINE

## 2021-03-16 PROCEDURE — G8417 CALC BMI ABV UP PARAM F/U: HCPCS | Performed by: INTERNAL MEDICINE

## 2021-03-16 PROCEDURE — G8754 DIAS BP LESS 90: HCPCS | Performed by: INTERNAL MEDICINE

## 2021-03-16 PROCEDURE — G8432 DEP SCR NOT DOC, RNG: HCPCS | Performed by: INTERNAL MEDICINE

## 2021-03-16 PROCEDURE — G8752 SYS BP LESS 140: HCPCS | Performed by: INTERNAL MEDICINE

## 2021-03-16 PROCEDURE — 1101F PT FALLS ASSESS-DOCD LE1/YR: CPT | Performed by: INTERNAL MEDICINE

## 2021-03-16 PROCEDURE — 1090F PRES/ABSN URINE INCON ASSESS: CPT | Performed by: INTERNAL MEDICINE

## 2021-03-16 PROCEDURE — G8399 PT W/DXA RESULTS DOCUMENT: HCPCS | Performed by: INTERNAL MEDICINE

## 2021-03-16 PROCEDURE — G0463 HOSPITAL OUTPT CLINIC VISIT: HCPCS | Performed by: INTERNAL MEDICINE

## 2021-03-16 PROCEDURE — G8536 NO DOC ELDER MAL SCRN: HCPCS | Performed by: INTERNAL MEDICINE

## 2021-03-16 RX ORDER — TRAZODONE HYDROCHLORIDE 50 MG/1
50 TABLET ORAL
COMMUNITY
Start: 2021-03-12 | End: 2022-03-15

## 2021-03-16 RX ORDER — AMOXICILLIN 500 MG/1
TABLET, FILM COATED ORAL
COMMUNITY
Start: 2021-03-16 | End: 2021-05-10 | Stop reason: ALTCHOICE

## 2021-03-16 RX ORDER — CLOBETASOL PROPIONATE 0.5 MG/G
CREAM TOPICAL
COMMUNITY
Start: 2021-03-10

## 2021-03-16 NOTE — PROGRESS NOTES
Annie Xiao MD    [id number] [place], [address] [facility]    Office [phone],[facility] [phone]      Candance Mori is a 70 y.o. female is here for f/u visit . Primary care physician:  Karan Swartz NP      Chief complaint:    As documented in EMR    Assessment:    Hx of PVC/nonsustained VT - was on Mexiletine. It was discontinued because Holter monitor did not show any PVCs/significant abnormal rhythms. E cardio event monitor after stopping mexiletine (2018)-sinus rhythm/1 PVC/no significant arrhythmias    Hx of non ischemic cardiomyopathy -resolved ;normal EF on ECHO 11/2014 ; 10/2016 ; 11/8/17  Hx of orthostasis/dizziness - currently aysmpotmatic  HTN  HLD            Plan:     Continue antihypertensives ( on B Blocker). Blood pressure controlled  Followed by Dr. Bharathi Ba in 6 months or earlier as needed          Patient understands the plan. All questions were answered to the patient's satisfaction. Medication Side Effects and Warnings were discussed with patient: yes  Patient Labs were reviewed and or requested:  yes  Patient Past Records were reviewed and or requested: yes    I appreciate the opportunity to be involved in Ms. Mirna Pizano. See note below for details. Please do not hesitate to contact us with questions or concerns. Annie Xiao MD    Cardiac Testing/ Procedures: A. Cardiac Cath/PCI:    B.ECHO/ISAAC:    3/12/20 -EF 55-60%/Grade 1 DD/Trace MR  11/8/17 - EF 55-60%/Grade 1 DD  10/5/16-EF 82-55%, grade 1 diastolic dysfunction, mild TR  11/21/14Left ventricle: Systolic function was normal. Ejection fraction was  estimated to be 55 %. There were no regional wall motion abnormalities. Doppler parameters were consistent with abnormal left ventricular  relaxation (grade 1 diastolic dysfunction). Left atrium: The atrium was mildly dilated. Mitral valve: There was mild regurgitation. Tricuspid valve:  There was mild regurgitation. 10/2013 - EF 55%    7/2007 - EF 45%    C. StressNuclear/Stress ECHO/Stress test: [facility] Stress ECHO 9/29/20 11/8/17 - Treadmill test - 4.39 min/nml  04/99 - Persantine cardiolite - EF 48%/No inducible ischemia      D. Vascular: 3/12/20 - Nm ERIC bilat    E. EP: Hx on nonsustained VT/PVC- on mexilitine  12/4/98 - tilt test with marked heart rate variablility and drop in BP without hemodynamic collapse    8/9/16 - Holter - One blocked P wave ( 4.50 AM) ; no PV; SR  2/11/18 to 3/12/18E  cardio event monitor-sinus rhythm/one PVC    F. Miscellaneous:    Subjective:  Candance Mori is a 70 y.o. female who returns for follow up  Visit. Doing well  No papitations/dizziness  No CP/dyspnea    Had uneventful postop period  Post left shoulder replacement         ROS:  (bold if positive, if negative)             Medications before admission:    Current Outpatient Medications   Medication Sig Dispense    traZODone (DESYREL) 50 mg tablet      clobetasoL (TEMOVATE) 0.05 % topical cream      venlafaxine (EFFEXOR) 25 mg tablet Take 1 Tab by mouth Every morning. Anti-depressant; for neuropathy pain 30 Tab    metoprolol succinate (TOPROL-XL) 25 mg XL tablet Take 1 Tab by mouth daily. 90 Tab    fenofibrate nanocrystallized (TRICOR) 145 mg tablet Take 1 Tab by mouth daily. 90 Tab    losartan (COZAAR) 50 mg tablet TAKE 1 TABLET DAILY 90 Tab    escitalopram oxalate (LEXAPRO) 20 mg tablet      acetaminophen (TYLENOL) 500 mg tablet Take 500 mg by mouth as needed.  metFORMIN (GLUCOPHAGE) 1,000 mg tablet Take 1 Tab by mouth two (2) times daily (with meals). 180 Tab    aspirin delayed-release 81 mg tablet Take 81 mg by mouth nightly.  levothyroxine (SYNTHROID) 137 mcg tablet Take 137 mcg by mouth Daily (before breakfast).  cholecalciferol, VITAMIN D3, (VITAMIN D3) 5,000 unit tab tablet Take 5,000 Units by mouth daily.  raloxifene (EVISTA) 60 mg tablet Take 60 mg by mouth daily.      CYANOCOBALAMIN (VITAMIN B-12 IJ) by Injection route every fourteen (14) days. SQ     amoxicillin 500 mg tab Take 4 tablets 1 hour prior to procedure.  atorvastatin (LIPITOR) 10 mg tablet TAKE 1 TABLET NIGHTLY 90 Tab     No current facility-administered medications for this visit. Physical Exam:  Visit Vitals  /76 (BP 1 Location: Right upper arm, BP Patient Position: Sitting)   Pulse 78   Ht 5' 4\" (1.626 m)   Wt 187 lb 6.4 oz (85 kg)   SpO2 98%   BMI 32.17 kg/m²          Gen: Well-developed, well-nourished, in no acute distress  Neck: Supple,No JVD, No Carotid Bruit,   Resp: No accessory muscle use, Clear breath sounds, No rales or rhonchi  Card: Regular Rate,Rythm,Normal S1, S2, No murmurs, rubs or gallop. No thrills.    Abd:  Soft, non-tender, non-distended,BS+,   MSK: No cyanosis  Skin: No rashes    Neuro: moving all four extremities , follows commands appropriately  Psych:  Good insight, oriented to person, place , alert, Nml Affect  LE: No edema    EKG -       LABS:        Lab Results   Component Value Date/Time    WBC 8.6 03/26/2018 11:47 AM    HGB 8.6 (L) 04/10/2018 04:36 AM    HCT 37.7 03/26/2018 11:47 AM    PLATELET 617 06/67/1760 11:47 AM    MCV 91.1 03/26/2018 11:47 AM     Lab Results   Component Value Date/Time    Sodium 142 01/08/2019 09:50 AM    Potassium 4.7 01/08/2019 09:50 AM    Chloride 105 01/08/2019 09:50 AM    CO2 18 (L) 01/08/2019 09:50 AM    Anion gap 7 04/10/2018 04:36 AM    Glucose 97 03/04/2021 10:32 AM    BUN 15 01/08/2019 09:50 AM    Creatinine 0.49 (L) 01/08/2019 09:50 AM    BUN/Creatinine ratio 31 (H) 01/08/2019 09:50 AM    GFR est  01/08/2019 09:50 AM    GFR est non- 01/08/2019 09:50 AM    Calcium 9.5 01/08/2019 09:50 AM       Lab Results   Component Value Date/Time    aPTT 25.2 09/14/2015 12:06 PM     Lab Results   Component Value Date/Time    INR 1.1 03/26/2018 11:47 AM    INR 1.0 09/14/2015 12:06 PM    Prothrombin time 11.2 (H) 03/26/2018 11:47 AM    Prothrombin time 10.4 09/14/2015 12:06 PM     No components found for: LAST LIPIDS        Remigio Grover MD

## 2021-03-16 NOTE — PROGRESS NOTES
Chief Complaint   Patient presents with    Follow-up     Patient presents today for a follow up visit    Hypertension    Cardiomyopathy     Visit Vitals  /76 (BP 1 Location: Right upper arm, BP Patient Position: Sitting)   Pulse 78   Ht 5' 4\" (1.626 m)   Wt 187 lb 6.4 oz (85 kg)   SpO2 98%   BMI 32.17 kg/m²       Chest pain denied  SOB - denied  Dizziness at times when get up too fast  Swelling/Edema - denied  Recent hospital visit denied  Refills denied

## 2021-03-18 DIAGNOSIS — I42.8 OTHER CARDIOMYOPATHY (HCC): ICD-10-CM

## 2021-03-18 DIAGNOSIS — I49.3 PVC (PREMATURE VENTRICULAR CONTRACTION): ICD-10-CM

## 2021-03-18 RX ORDER — METOPROLOL SUCCINATE 25 MG/1
25 TABLET, EXTENDED RELEASE ORAL DAILY
Qty: 90 TAB | Refills: 1 | Status: SHIPPED | OUTPATIENT
Start: 2021-03-18 | End: 2021-05-04 | Stop reason: SDUPTHER

## 2021-03-20 ENCOUNTER — IMMUNIZATION (OUTPATIENT)
Dept: INTERNAL MEDICINE CLINIC | Age: 71
End: 2021-03-20
Payer: MEDICARE

## 2021-03-20 DIAGNOSIS — Z23 ENCOUNTER FOR IMMUNIZATION: Primary | ICD-10-CM

## 2021-03-20 PROCEDURE — 91300 COVID-19, MRNA, LNP-S, PF, 30MCG/0.3ML DOSE(PFIZER): CPT | Performed by: FAMILY MEDICINE

## 2021-03-20 PROCEDURE — 0001A COVID-19, MRNA, LNP-S, PF, 30MCG/0.3ML DOSE(PFIZER): CPT | Performed by: FAMILY MEDICINE

## 2021-04-10 ENCOUNTER — IMMUNIZATION (OUTPATIENT)
Dept: INTERNAL MEDICINE CLINIC | Age: 71
End: 2021-04-10
Payer: MEDICARE

## 2021-04-10 DIAGNOSIS — Z23 ENCOUNTER FOR IMMUNIZATION: Primary | ICD-10-CM

## 2021-04-10 PROCEDURE — 91300 COVID-19, MRNA, LNP-S, PF, 30MCG/0.3ML DOSE(PFIZER): CPT | Performed by: FAMILY MEDICINE

## 2021-04-10 PROCEDURE — 0002A COVID-19, MRNA, LNP-S, PF, 30MCG/0.3ML DOSE(PFIZER): CPT | Performed by: FAMILY MEDICINE

## 2021-05-07 ENCOUNTER — TELEPHONE (OUTPATIENT)
Dept: CARDIOLOGY CLINIC | Age: 71
End: 2021-05-07

## 2021-05-07 DIAGNOSIS — M79.89 LEG SWELLING: ICD-10-CM

## 2021-05-07 DIAGNOSIS — R06.02 SHORTNESS OF BREATH: ICD-10-CM

## 2021-05-07 DIAGNOSIS — I10 ESSENTIAL (PRIMARY) HYPERTENSION: Primary | ICD-10-CM

## 2021-05-07 NOTE — TELEPHONE ENCOUNTER
Called pt - more SOB with LE swelling the past month. Difficult to put on shoes. + wheeze. States she is exhausted with minimal exertion - feels symptoms are new. No CP. Agreeable to get labs at [address] [place] [address] in [facility] later today.      Will see in office on Monday at 11am.

## 2021-05-07 NOTE — TELEPHONE ENCOUNTER
----- Message from Zoya Diaz RN sent at 5/7/2021  8:20 AM EDT -----  Regarding: FW: Non-Urgent Medical Question  Contact: [phone]    ----- Message -----  From: Jeff Silva  Sent: 5/6/2021   5:46 PM EDT  To: [facility] Nurse Pool  Subject: Non-Urgent Medical Question                      Yes I have a monitor. Not really sort of breath but I am tired and struggle with every thing I do and I do a lot of wheezing.  Thank you

## 2021-05-10 ENCOUNTER — OFFICE VISIT (OUTPATIENT)
Dept: CARDIOLOGY CLINIC | Age: 71
End: 2021-05-10
Payer: MEDICARE

## 2021-05-10 ENCOUNTER — TELEPHONE (OUTPATIENT)
Dept: NEUROLOGY | Age: 71
End: 2021-05-10

## 2021-05-10 VITALS
SYSTOLIC BLOOD PRESSURE: 142 MMHG | OXYGEN SATURATION: 98 % | HEART RATE: 78 BPM | BODY MASS INDEX: 31.41 KG/M2 | DIASTOLIC BLOOD PRESSURE: 80 MMHG | HEIGHT: 64 IN | WEIGHT: 184 LBS

## 2021-05-10 DIAGNOSIS — I10 ESSENTIAL HYPERTENSION: ICD-10-CM

## 2021-05-10 DIAGNOSIS — Z86.79 HX OF CARDIOMYOPATHY: ICD-10-CM

## 2021-05-10 DIAGNOSIS — Z99.89 OSA ON CPAP: ICD-10-CM

## 2021-05-10 DIAGNOSIS — G47.33 OSA ON CPAP: ICD-10-CM

## 2021-05-10 DIAGNOSIS — R53.83 FATIGUE, UNSPECIFIED TYPE: Primary | ICD-10-CM

## 2021-05-10 DIAGNOSIS — E78.5 DYSLIPIDEMIA: ICD-10-CM

## 2021-05-10 DIAGNOSIS — I49.3 PVC (PREMATURE VENTRICULAR CONTRACTION): ICD-10-CM

## 2021-05-10 LAB
BUN SERPL-MCNC: 13 MG/DL (ref 8–27)
BUN/CREAT SERPL: 21 (ref 12–28)
CALCIUM SERPL-MCNC: 10 MG/DL (ref 8.7–10.3)
CHLORIDE SERPL-SCNC: 106 MMOL/L (ref 96–106)
CO2 SERPL-SCNC: 21 MMOL/L (ref 20–29)
CREAT SERPL-MCNC: 0.63 MG/DL (ref 0.57–1)
ERYTHROCYTE [DISTWIDTH] IN BLOOD BY AUTOMATED COUNT: 14.3 % (ref 11.7–15.4)
GLUCOSE SERPL-MCNC: 94 MG/DL (ref 65–99)
HCT VFR BLD AUTO: 37 % (ref 34–46.6)
HGB BLD-MCNC: 11.9 G/DL (ref 11.1–15.9)
MCH RBC QN AUTO: 27.3 PG (ref 26.6–33)
MCHC RBC AUTO-ENTMCNC: 32.2 G/DL (ref 31.5–35.7)
MCV RBC AUTO: 85 FL (ref 79–97)
NT-PROBNP SERPL-MCNC: 67 PG/ML (ref 0–301)
PLATELET # BLD AUTO: 388 X10E3/UL (ref 150–450)
POTASSIUM SERPL-SCNC: 4.6 MMOL/L (ref 3.5–5.2)
RBC # BLD AUTO: 4.36 X10E6/UL (ref 3.77–5.28)
SODIUM SERPL-SCNC: 143 MMOL/L (ref 134–144)
SPECIMEN STATUS REPORT, ROLRST: NORMAL
WBC # BLD AUTO: 9.5 X10E3/UL (ref 3.4–10.8)

## 2021-05-10 PROCEDURE — G8536 NO DOC ELDER MAL SCRN: HCPCS | Performed by: NURSE PRACTITIONER

## 2021-05-10 PROCEDURE — G8753 SYS BP > OR = 140: HCPCS | Performed by: NURSE PRACTITIONER

## 2021-05-10 PROCEDURE — G8754 DIAS BP LESS 90: HCPCS | Performed by: NURSE PRACTITIONER

## 2021-05-10 PROCEDURE — G8399 PT W/DXA RESULTS DOCUMENT: HCPCS | Performed by: NURSE PRACTITIONER

## 2021-05-10 PROCEDURE — 1101F PT FALLS ASSESS-DOCD LE1/YR: CPT | Performed by: NURSE PRACTITIONER

## 2021-05-10 PROCEDURE — 1090F PRES/ABSN URINE INCON ASSESS: CPT | Performed by: NURSE PRACTITIONER

## 2021-05-10 PROCEDURE — 3017F COLORECTAL CA SCREEN DOC REV: CPT | Performed by: NURSE PRACTITIONER

## 2021-05-10 PROCEDURE — G8417 CALC BMI ABV UP PARAM F/U: HCPCS | Performed by: NURSE PRACTITIONER

## 2021-05-10 PROCEDURE — G0463 HOSPITAL OUTPT CLINIC VISIT: HCPCS | Performed by: NURSE PRACTITIONER

## 2021-05-10 PROCEDURE — G8432 DEP SCR NOT DOC, RNG: HCPCS | Performed by: NURSE PRACTITIONER

## 2021-05-10 PROCEDURE — 99214 OFFICE O/P EST MOD 30 MIN: CPT | Performed by: NURSE PRACTITIONER

## 2021-05-10 PROCEDURE — G8427 DOCREV CUR MEDS BY ELIG CLIN: HCPCS | Performed by: NURSE PRACTITIONER

## 2021-05-10 NOTE — PROGRESS NOTES
ELI Starr  [id number]  [place], [address] [facility]    Office [phone]  Fax [phone]        Jeff Silva is a 70 y.o. female is here for f/u visit . Primary care physician:  Ryder Willett DO    Chief complaint:  Fatigue    Assessment:  Fatigue   PATTI - resolved  Hx of PVC/nonsustained VT - was on Mexiletine. It was discontinued because Holter monitor did not show any PVCs/significant abnormal rhythms. E cardio event monitor after stopping mexiletine (2018)-sinus rhythm/1 PVC/no significant arrhythmias  Hx of non ischemic cardiomyopathy -resolved   Hx of orthostasis/dizziness  HTN    HLD   GARCIA - compliant with CPAP     Plan:   BMP, CBC, and proBNP nml - no edema or signs of vol overload on exam today. Pt denies CP and breathing issues - fatigue sounds non-cardiac. Dicussed NM stress testing - stress echo 9/2020 was low risk; echo 3/2020 with nml LVEF. No WMA. No evidence of pHTN. G1DD  Recommend add'll eval of fatigue with PCP- If ongoing fatigue or shortness of breath without clear cause, will order NM stress or CTA coronary for further eval   Continue antihypertensives ( on B Blocker and ARB). Blood pressure OK - ok with mild HTN to avoid orthostasis  Restart statin - atorvastatin 10mg/d (accidentally stopped taking a few months back)    Followed by Dr. Jesus Ragland     F/u in 4mo or PRN    Patient understands the plan. All questions were answered to the patient's satisfaction. Medication Side Effects and Warnings were discussed with patient: yes  Patient Labs were reviewed and or requested:  yes  Patient Past Records were reviewed and or requested: yes    I appreciate the opportunity to be involved in Ms. Jass Ferraro. See note below for details. Please do not hesitate to contact us with questions or concerns. Lokesh Abreu NP    Cardiac Testing/ Procedures: A. Cardiac Cath/PCI:    B.ECHO/ISAAC:    3/12/20 -EF 55-60%/Grade 1 DD/Trace MR  11/8/17 - EF 55-60%/Grade 1 DD  10/5/16-EF 93-95%, grade 1 diastolic dysfunction, mild TR  11/21/14Left ventricle: Systolic function was normal. Ejection fraction was  estimated to be 55 %. There were no regional wall motion abnormalities. Doppler parameters were consistent with abnormal left ventricular  relaxation (grade 1 diastolic dysfunction). Left atrium: The atrium was mildly dilated. Mitral valve: There was mild regurgitation. Tricuspid valve: There was mild regurgitation. 10/2013 - EF 55%    7/2007 - EF 45%    C. StressNuclear/Stress ECHO/Stress test: [facility] Stress ECHO 9/29/20 11/8/17 - Treadmill test - 4.39 min/nml  04/99 - Persantine cardiolite - EF 48%/No inducible ischemia      D. Vascular: 3/12/20 - Nml ERIC bilat    E. EP: Hx on nonsustained VT/PVC- on mexilitine  12/4/98 - tilt test with marked heart rate variablility and drop in BP without hemodynamic collapse    8/9/16 - Holter - One blocked P wave ( 4.50 AM) ; no PV; SR  2/11/18 to 3/12/18E  cardio event monitor-sinus rhythm/one PVC    F. Miscellaneous:    Subjective:  Pt here for f/u with concern of PATTI. Sent message to our office on 5/4 with concern - Also reported fatigue and exercise intolerance. Labs ordered for review including proBNP, CBC, and BMP. Labs were unremarkable. LE swelling resolved. Pt very frustrated as she doesn't understand why she is always so tired. Also having GI issues with colonoscopy planned for later this week. Has hernia with upcoming CT scan. Overwhelmed with medical issues. Synthroid dose recently decreased (6wks ago); pt hasn't had level rechecked. She wonders if this is still 'off'. Uses CPAP with good compliance. Patient denies any exertional chest pain, palpitations, syncope, or paroxysmal nocturnal dyspnea. Fatigue per above but doesn't really feel SOB necessarily. Multiple CAD risk factors - Father with MI in his 62s.  +pre DM, HTN, HLD  Denies tobacco use. Stopped taking statin a few months back, unsure why she stopped. Agreeable to restart. ROS:  (Positive findings above)  Constitutional: Negative for fever, chills, and diaphoresis. Respiratory: Negative for cough, hemoptysis, sputum production,   Cardiovascular: Negative for chest pain, palpitations,and PND. Gastrointestinal: Negative for vomiting, blood in stool and melena. Genitourinary: Negative for dysuria and flank pain. Neurological: Negative for focal weakness, seizures, loss of consciousness  Endo/Heme/Allergies: Negative for abnormal bleeding. Psychiatric/Behavioral: Negative for memory loss. Medications before admission:    Current Outpatient Medications   Medication Sig Dispense    traZODone (DESYREL) 50 mg tablet      clobetasoL (TEMOVATE) 0.05 % topical cream      fenofibrate nanocrystallized (TRICOR) 145 mg tablet Take 1 Tab by mouth daily. 90 Tab    losartan (COZAAR) 50 mg tablet TAKE 1 TABLET DAILY 90 Tab    escitalopram oxalate (LEXAPRO) 20 mg tablet      metFORMIN (GLUCOPHAGE) 1,000 mg tablet Take 1 Tab by mouth two (2) times daily (with meals). 180 Tab    aspirin delayed-release 81 mg tablet Take 81 mg by mouth nightly.  levothyroxine (SYNTHROID) 137 mcg tablet Take 112 mcg by mouth Daily (before breakfast).  cholecalciferol, VITAMIN D3, (VITAMIN D3) 5,000 unit tab tablet Take 5,000 Units by mouth daily.  raloxifene (EVISTA) 60 mg tablet Take 60 mg by mouth daily.  CYANOCOBALAMIN (VITAMIN B-12 IJ) by Injection route every fourteen (14) days. SQ     metoprolol succinate (TOPROL-XL) 25 mg XL tablet Take 1 Tab by mouth daily. 90 Tab    atorvastatin (LIPITOR) 10 mg tablet TAKE 1 TABLET NIGHTLY 90 Tab     No current facility-administered medications for this visit.         Physical Exam:  Visit Vitals  BP (!) 142/80 (BP 1 Location: Right upper arm, BP Patient Position: Sitting)   Pulse 78   Ht 5' 4\" (1.626 m)   Wt 184 lb (83.5 kg)   SpO2 98%   BMI 31.58 kg/m² General - well developed well nourished  Neck - neck supple, no JVD  Cardiac - normal S1, S2, RRR, no murmurs, rubs or gallops. No clicks  Vascular - carotids without bruits, radials pulses equal bilateral  Lungs - clear to auscultation bilaterals, no rales, wheezing or rhonchi  Abd - soft nontender, non-distended, +BS  Extremities - no edema, warm  Neuro - nonfocal  Psych - normal mood and affect      LABS:  Lab Results   Component Value Date/Time    Sodium 143 05/07/2021 12:40 PM    Potassium 4.6 05/07/2021 12:40 PM    Chloride 106 05/07/2021 12:40 PM    CO2 21 05/07/2021 12:40 PM    Anion gap 7 04/10/2018 04:36 AM    Glucose 94 05/07/2021 12:40 PM    Glucose 97 03/04/2021 10:32 AM    BUN 13 05/07/2021 12:40 PM    Creatinine 0.63 05/07/2021 12:40 PM    BUN/Creatinine ratio 21 05/07/2021 12:40 PM    GFR est  05/07/2021 12:40 PM    GFR est non-AA 91 05/07/2021 12:40 PM    Calcium 10.0 05/07/2021 12:40 PM    Bilirubin, total 0.3 12/04/2018 09:37 AM    Alk.  phosphatase 42 12/04/2018 09:37 AM    Protein, total 7.0 02/24/2021 10:49 AM    Albumin 4.7 12/04/2018 09:37 AM    Globulin 3.3 09/14/2015 12:06 PM    A-G Ratio 1.7 02/01/2017 09:37 AM    ALT (SGPT) 8 12/04/2018 09:37 AM    AST (SGOT) 17 12/04/2018 09:37 AM     Lab Results   Component Value Date/Time    WBC 9.5 05/07/2021 12:40 PM    HGB 11.9 05/07/2021 12:40 PM    HCT 37.0 05/07/2021 12:40 PM    PLATELET 162 35/46/7059 12:40 PM    MCV 85 05/07/2021 12:40 PM     Lab Results   Component Value Date/Time    Cholesterol, total 180 12/04/2018 09:37 AM    HDL Cholesterol 42 12/04/2018 09:37 AM    LDL, calculated 102 (H) 12/04/2018 09:37 AM    VLDL, calculated 36 12/04/2018 09:37 AM    Triglyceride 180 (H) 12/04/2018 09:37 AM     Lab Results   Component Value Date/Time    TSH 0.864 02/07/2018 07:54 AM    T4, Free 1.55 03/09/2016 09:17 AM     Lab Results   Component Value Date/Time    Hemoglobin A1c 5.4 03/26/2018 11:47 AM     No results found for: CPK, RCK1, RCK2, RCK3, RCK4, CKMB, CKNDX, CKND1, TROPT, TROIQ, BNPP, BNP    Component      Latest Ref Rng & Units 5/7/2021          12:40 PM   NT pro-BNP      0 - 301 pg/mL [address] NP

## 2021-05-10 NOTE — PROGRESS NOTES
Chief Complaint   Patient presents with    Hypertension    Cholesterol Problem    Cardiomyopathy    Other     PVCs     Visit Vitals  BP (!) 142/80 (BP 1 Location: Right upper arm, BP Patient Position: Sitting)   Pulse 78   Ht 5' 4\" (1.626 m)   Wt 184 lb (83.5 kg)   SpO2 98%   BMI 31.58 kg/m²     Chest pain denied   Palpations SOMETIMES, NOT COORDINATED WITH ANY ACTIVITY  SOB WITH WALKING AND STAIRS  Dizziness denied  Swelling in hands/feet LAST WEEK HER FEET AND LEGS WERE SO SWOLLEN [address]  Recent hospital stays denied     BALANCE HAS FELT OFF

## 2021-05-10 NOTE — PATIENT INSTRUCTIONS
NO changes today See MD in 4mo or as needed If ongoing fatigue / shortness of breath without clear cause with primary care, can follow-up sooner for stress testing.

## 2021-05-10 NOTE — TELEPHONE ENCOUNTER
----- Message from Mara Belcher sent at 5/10/2021 12:11 PM EDT -----  Regarding: Dr. Randa Montes Message/Vendor Calls    Caller's first and last name:  PT      Reason for call:  Requesting to fax pt's latest test and lab results to Dr. Kaleb yLnn.  Patricia Oseguera, PCP [phone]      Callback required yes/no and why:  Yes, confirming information was faxed      Best contact number(s):  [phone]      Details to clarify the request:      Mara Belcher

## 2021-05-11 NOTE — TELEPHONE ENCOUNTER
Returned her call. Faxed over notes and testing results to her PCP per request. Received confirmation. Left msg.

## 2021-06-10 NOTE — INTERVAL H&P NOTE
H&P Update:  Percy Cisneros was seen and examined. History and physical has been reviewed. The patient has been examined.  There have been no significant clinical changes since the completion of the originally dated History and Physical.    Signed By: KONG Nelson     April 9, 2018 9:10 AM No

## 2021-08-04 ENCOUNTER — OFFICE VISIT (OUTPATIENT)
Dept: FAMILY MEDICINE CLINIC | Age: 71
End: 2021-08-04
Payer: MEDICARE

## 2021-08-04 VITALS
OXYGEN SATURATION: 98 % | RESPIRATION RATE: 16 BRPM | DIASTOLIC BLOOD PRESSURE: 78 MMHG | HEIGHT: 64 IN | WEIGHT: 186.25 LBS | BODY MASS INDEX: 31.8 KG/M2 | HEART RATE: 76 BPM | SYSTOLIC BLOOD PRESSURE: 132 MMHG | TEMPERATURE: 97.3 F

## 2021-08-04 DIAGNOSIS — M81.0 AGE-RELATED OSTEOPOROSIS WITHOUT CURRENT PATHOLOGICAL FRACTURE: ICD-10-CM

## 2021-08-04 DIAGNOSIS — Z76.89 ENCOUNTER TO ESTABLISH CARE: ICD-10-CM

## 2021-08-04 DIAGNOSIS — Z71.2 ENCOUNTER TO DISCUSS TEST RESULTS: ICD-10-CM

## 2021-08-04 DIAGNOSIS — K43.9 ABDOMINAL WALL HERNIA: Primary | ICD-10-CM

## 2021-08-04 PROCEDURE — 1090F PRES/ABSN URINE INCON ASSESS: CPT | Performed by: NURSE PRACTITIONER

## 2021-08-04 PROCEDURE — G8432 DEP SCR NOT DOC, RNG: HCPCS | Performed by: NURSE PRACTITIONER

## 2021-08-04 PROCEDURE — G8427 DOCREV CUR MEDS BY ELIG CLIN: HCPCS | Performed by: NURSE PRACTITIONER

## 2021-08-04 PROCEDURE — 99204 OFFICE O/P NEW MOD 45 MIN: CPT | Performed by: NURSE PRACTITIONER

## 2021-08-04 PROCEDURE — G8754 DIAS BP LESS 90: HCPCS | Performed by: NURSE PRACTITIONER

## 2021-08-04 PROCEDURE — 3017F COLORECTAL CA SCREEN DOC REV: CPT | Performed by: NURSE PRACTITIONER

## 2021-08-04 PROCEDURE — G8752 SYS BP LESS 140: HCPCS | Performed by: NURSE PRACTITIONER

## 2021-08-04 PROCEDURE — G8417 CALC BMI ABV UP PARAM F/U: HCPCS | Performed by: NURSE PRACTITIONER

## 2021-08-04 PROCEDURE — G8536 NO DOC ELDER MAL SCRN: HCPCS | Performed by: NURSE PRACTITIONER

## 2021-08-04 PROCEDURE — 1101F PT FALLS ASSESS-DOCD LE1/YR: CPT | Performed by: NURSE PRACTITIONER

## 2021-08-04 RX ORDER — BUSPIRONE HYDROCHLORIDE 10 MG/1
10 TABLET ORAL
COMMUNITY
Start: 2021-07-23

## 2021-08-04 RX ORDER — ALENDRONATE SODIUM 70 MG/1
70 TABLET ORAL
Qty: 12 TABLET | Refills: 3 | Status: SHIPPED | OUTPATIENT
Start: 2021-08-04 | End: 2021-12-06

## 2021-08-04 RX ORDER — ACETAMINOPHEN 500 MG
TABLET ORAL DAILY
COMMUNITY

## 2021-08-04 NOTE — PATIENT INSTRUCTIONS

## 2021-08-04 NOTE — PROGRESS NOTES
Subjective  Chief Complaint   Patient presents with    Other     stomach issues, re-establish care     HPI:  Price Saint is a 70 y.o. female. Patient presents to reestablish care with the practice. She was last seen 4/17/2019. Most recently seen at [place] [address]. Presents to discuss:  1. Most recent lab results from previous PCP. 2. Abdominal hernia for the past 6 months. Has seen GI and was told they could not fix it. Hernia is uncomfortable but not painful. Requesting general surgery referral for possible repair. 3. Evista is too expensive on new insurance, would like alternative. Last bone density 3-4 years ago. Medication started following radiation by oncology. Follows with cardiology for management of HTN, PVCs, and hyperlipidemia. Follows with GI for management of chronic diarrhea. Follows with neurology for management of idiopathic peripheral neuropathy. Follows with urology for abnormal urine odor and stress incontinence. Follows with endocrinology for management of hypothyroidism. Previous PCP was managing insomnia, anxiety, and prediabetes. Unsure when last [facility] exam was completed. Past Medical History:   Diagnosis Date    Anxiety     Autonomic dysfunction 12/4/98    tilt test with marked heart rate variablility and drop in BP without hemodynamic collapse    Bladder stone     Cancer [facility]) 1997    left breast lumpectomy , Rx RT    Diabetes ([facility] [address]     pre-diabetic, on metformin    Dilated cardiomyopathy ([facility] [address] 2/8/06    nonischemic    Elevated triglycerides with high cholesterol 11/21/2014    Essential hypertension 2/9/2017    Hx MRSA infection 3/26/2018    [place] Hypothyroidism 1994    Dr. Byron Goncalves.    Insulin resistance 11/21/2014    Mixed hyperlipidemia with apolipoprotein E2 variant 11/21/2014    Nausea & vomiting     Osteoarthritis     diffuse.  Dr. Temo Rodriguez Other and unspecified hyperlipidemia 12/21/2010    PONV (postoperative nausea and vomiting)     Psychiatric disorder     anxiety    PVC's 1994    Sleep apnea     Not wearing CPAP    Vitamin D deficiency 11/21/2014     Family History   Problem Relation Age of Onset    Kidney Disease Mother     Cancer Mother         ovarian, esophageal cancer    Heart Disease Father     Coronary Artery Disease Brother     No Known Problems Daughter     No Known Problems Daughter     No Known Problems Daughter     No Known Problems Daughter     Heart Attack Paternal Uncle         MI IN 40S    Heart Attack Paternal Grandmother         MI IN 40S    Anesth Problems Neg Hx      Social History     Socioeconomic History    Marital status:      Spouse name: Not on file    Number of children: Not on file    Years of education: Not on file    Highest education level: Not on file   Occupational History    Not on file   Tobacco Use    Smoking status: Never Smoker    Smokeless tobacco: Never Used   Vaping Use    Vaping Use: Never used   Substance and Sexual Activity    Alcohol use: No     Alcohol/week: 0.0 standard drinks    Drug use: No    Sexual activity: Not on file   Other Topics Concern    Not on file   Social History Narrative    Not on file     Social Determinants of Health     Financial Resource Strain:     Difficulty of Paying Living Expenses:    Food Insecurity:     Worried About Running Out of Food in the Last Year:     Ran Out of Food in the Last Year:    Transportation Needs:     Lack of Transportation (Medical):      Lack of Transportation (Non-Medical):    Physical Activity:     Days of Exercise per Week:     Minutes of Exercise per Session:    Stress:     Feeling of Stress :    Social Connections:     Frequency of Communication with Friends and Family:     Frequency of Social Gatherings with Friends and Family:     Attends [employer] Services:     Active Member of Clubs or Organizations:     Attends Club or Organization Meetings:     Marital Status:    Intimate Partner Violence:  Fear of Current or Ex-Partner:     Emotionally Abused:     Physically Abused:     Sexually Abused:      Current Outpatient Medications on File Prior to Visit   Medication Sig Dispense Refill    cholecalciferol (VITAMIN D3) (2,000 UNITS /50 MCG) cap capsule Take  by mouth daily.  metoprolol succinate (TOPROL-XL) 25 mg XL tablet Take 1 Tab by mouth daily. 90 Tab 1    traZODone (DESYREL) 50 mg tablet       clobetasoL (TEMOVATE) 0.05 % topical cream       fenofibrate nanocrystallized (TRICOR) 145 mg tablet Take 1 Tab by mouth daily. 90 Tab 0    losartan (COZAAR) 50 mg tablet TAKE 1 TABLET DAILY 90 Tab 0    escitalopram oxalate (LEXAPRO) 20 mg tablet       atorvastatin (LIPITOR) 10 mg tablet TAKE 1 TABLET NIGHTLY 90 Tab 0    metFORMIN (GLUCOPHAGE) 1,000 mg tablet Take 1 Tab by mouth two (2) times daily (with meals). 180 Tab 3    aspirin delayed-release 81 mg tablet Take 81 mg by mouth nightly.  levothyroxine (SYNTHROID) 137 mcg tablet Take 112 mcg by mouth Daily (before breakfast).  CYANOCOBALAMIN (VITAMIN B-12 IJ) by Injection route every fourteen (14) days. SQ      busPIRone (BUSPAR) 10 mg tablet Take 10 mg by mouth daily as needed.  [DISCONTINUED] cholecalciferol, VITAMIN D3, (VITAMIN D3) 5,000 unit tab tablet Take 5,000 Units by mouth daily.  [DISCONTINUED] raloxifene (EVISTA) 60 mg tablet Take 60 mg by mouth daily. No current facility-administered medications on file prior to visit. Allergies   Allergen Reactions    Lactose Diarrhea and Nausea Only    Sulfa (Sulfonamide Antibiotics) Hives     ROS  See HPI for pertinent ROS. Objective  Visit Vitals  /78 (BP 1 Location: Right upper arm, BP Patient Position: Sitting)   Pulse 76   Temp 97.3 °F (36.3 °C)   Resp 16   Ht 5' 4\" (1.626 m)   Wt 186 lb 4 oz (84.5 kg)   SpO2 98%   BMI 31.97 kg/m²       Physical Exam  Vitals and nursing note reviewed. Constitutional:       General: She is not in acute distress. Appearance: Normal appearance. She is obese. HENT:      Head: Normocephalic. Eyes:      Extraocular Movements: Extraocular movements intact. Cardiovascular:      Rate and Rhythm: Normal rate and regular rhythm. Heart sounds: Normal heart sounds. Pulmonary:      Effort: Pulmonary effort is normal.      Breath sounds: Normal breath sounds. Abdominal:      Hernia: A hernia is present. Hernia is present in the ventral area. Musculoskeletal:         General: Normal range of motion. Right lower leg: No edema. Left lower leg: No edema. Skin:     General: Skin is warm and dry. Neurological:      Mental Status: She is alert and oriented to person, place, and time. Psychiatric:         Mood and Affect: Mood normal.         Behavior: Behavior normal.          Assessment & Plan      ICD-10-CM ICD-9-CM    1. Abdominal wall hernia  K43.9 553.20 REFERRAL TO GENERAL SURGERY   2. Age-related osteoporosis without current pathological fracture  M81.0 733.01 alendronate (FOSAMAX) 70 mg tablet   3. Encounter to discuss test results  Z71.2 V65.49    4. Encounter to establish care  Z76.89 V65.8      Diagnoses and all orders for this visit:    1. Abdominal wall hernia  General surgery referral for imaging and further evaluation. Advised when to seek urgent care for nonreducible hernia, pain at hernia site, and/or discoloration at site.  -     REFERRAL TO GENERAL SURGERY    2. Age-related osteoporosis without current pathological fracture  Stop at the stop and start Fosamax as ordered. Advised to take first thing in the morning before anything else is take by mouth with 8 ounces of water. Remain upright for at least 30 minutes after dosing and until after your first food intake for the day. Do not eat or drink for 30minutes after dosing.  -     alendronate (FOSAMAX) 70 mg tablet; Take 1 Tablet by mouth every seven (7) days.     3. Encounter to discuss test results  Advised we have not received records from her previous PCP to review today. We will have her sign for old records and review labs and medications in more detail in 1 month. 4. Encounter to establish care      Follow-up and Dispositions    · Return in about 1 month (around 9/4/2021) for follow up, chronic conditions/meds, nonfasting.            Oskar Matias NP

## 2021-08-04 NOTE — PROGRESS NOTES
Chief Complaint   Patient presents with    Other     stomach issues, re-establish care   1. Have you been to the ER, urgent care clinic since your last visit? Hospitalized since your last visit? No    2. Have you seen or consulted any other health care providers outside of the [address] [place] [address] since your last visit? Include any pap smears or colon screening.  Yes When: [facility]

## 2021-08-05 ENCOUNTER — TELEPHONE (OUTPATIENT)
Dept: FAMILY MEDICINE CLINIC | Age: 71
End: 2021-08-05

## 2021-08-12 ENCOUNTER — OFFICE VISIT (OUTPATIENT)
Dept: CARDIOLOGY CLINIC | Age: 71
End: 2021-08-12
Payer: MEDICARE

## 2021-08-12 VITALS
OXYGEN SATURATION: 97 % | WEIGHT: 184 LBS | SYSTOLIC BLOOD PRESSURE: 126 MMHG | BODY MASS INDEX: 31.41 KG/M2 | HEIGHT: 64 IN | HEART RATE: 80 BPM | DIASTOLIC BLOOD PRESSURE: 70 MMHG

## 2021-08-12 DIAGNOSIS — Z86.79 HX OF CARDIOMYOPATHY: Primary | ICD-10-CM

## 2021-08-12 DIAGNOSIS — I10 ESSENTIAL HYPERTENSION: ICD-10-CM

## 2021-08-12 DIAGNOSIS — G47.33 OSA ON CPAP: ICD-10-CM

## 2021-08-12 DIAGNOSIS — Z99.89 OSA ON CPAP: ICD-10-CM

## 2021-08-12 DIAGNOSIS — E78.5 DYSLIPIDEMIA: ICD-10-CM

## 2021-08-12 PROCEDURE — G8417 CALC BMI ABV UP PARAM F/U: HCPCS | Performed by: INTERNAL MEDICINE

## 2021-08-12 PROCEDURE — G8427 DOCREV CUR MEDS BY ELIG CLIN: HCPCS | Performed by: INTERNAL MEDICINE

## 2021-08-12 PROCEDURE — 1090F PRES/ABSN URINE INCON ASSESS: CPT | Performed by: INTERNAL MEDICINE

## 2021-08-12 PROCEDURE — G8536 NO DOC ELDER MAL SCRN: HCPCS | Performed by: INTERNAL MEDICINE

## 2021-08-12 PROCEDURE — G8754 DIAS BP LESS 90: HCPCS | Performed by: INTERNAL MEDICINE

## 2021-08-12 PROCEDURE — 99214 OFFICE O/P EST MOD 30 MIN: CPT | Performed by: INTERNAL MEDICINE

## 2021-08-12 PROCEDURE — 3017F COLORECTAL CA SCREEN DOC REV: CPT | Performed by: INTERNAL MEDICINE

## 2021-08-12 PROCEDURE — G8432 DEP SCR NOT DOC, RNG: HCPCS | Performed by: INTERNAL MEDICINE

## 2021-08-12 PROCEDURE — G0463 HOSPITAL OUTPT CLINIC VISIT: HCPCS | Performed by: INTERNAL MEDICINE

## 2021-08-12 PROCEDURE — G8752 SYS BP LESS 140: HCPCS | Performed by: INTERNAL MEDICINE

## 2021-08-12 PROCEDURE — 1101F PT FALLS ASSESS-DOCD LE1/YR: CPT | Performed by: INTERNAL MEDICINE

## 2021-08-12 NOTE — PROGRESS NOTES
Jennifer Glass MD    [id number] [facility], [address] [facility]    Office [phone],HOW [phone]      Catherine Theodore is a 70 y.o. female is here for f/u visit . Primary care physician:  Thais Chauhan NP      Chief complaint:    As documented in EMR    Assessment:    Hx of PVC/nonsustained VT - was on Mexiletine. It was discontinued because Holter monitor did not show any PVCs/significant abnormal rhythms. E cardio event monitor after stopping mexiletine (2018)-sinus rhythm/1 PVC/no significant arrhythmias    Hx of non ischemic cardiomyopathy -resolved ;normal EF on ECHO 11/2014 ; 10/2016 ; 11/8/17  Hx of orthostasis/dizziness - currently aysmpotmatic  HTN  HLD  Chronic fatigue          Plan:     Continue antihypertensives ( on B Blocker). Echocardiogram  Blood pressure controlled  Followed by Dr. Gardner-endocrinologist   Aggressive cardiovascular risk factor modification. Advised to lose weight. Follow-up in 6 months or earlier as needed          Patient understands the plan. All questions were answered to the patient's satisfaction. Medication Side Effects and Warnings were discussed with patient: yes  Patient Labs were reviewed and or requested:  yes  Patient Past Records were reviewed and or requested: yes    I appreciate the opportunity to be involved in Ms. Wang Almazan. See note below for details. Please do not hesitate to contact us with questions or concerns. Jennifer Glass MD    Cardiac Testing/ Procedures: A. Cardiac Cath/PCI:    B.ECHO/ISAAC:    3/12/20 -EF 55-60%/Grade 1 DD/Trace MR  11/8/17 - EF 55-60%/Grade 1 DD  10/5/16-EF 06-16%, grade 1 diastolic dysfunction, mild TR  11/21/14Left ventricle: Systolic function was normal. Ejection fraction was  estimated to be 55 %. There were no regional wall motion abnormalities. Doppler parameters were consistent with abnormal left ventricular  relaxation (grade 1 diastolic dysfunction).     Left atrium: The atrium was mildly dilated. Mitral valve: There was mild regurgitation. Tricuspid valve: There was mild regurgitation. 10/2013 - EF 55%    7/2007 - EF 45%    C. StressNuclear/Stress ECHO/Stress test: [facility] Stress ECHO 9/29/20 - 4.1 min  11/8/17 - Treadmill test - 4.39 min/nml  04/99 - Persantine cardiolite - EF 48%/No inducible ischemia      D. Vascular: 3/12/20 - Nm ERIC bilat    E. EP: Hx on nonsustained VT/PVC- was on mexilitine  12/4/98 - tilt test with marked heart rate variablility and drop in BP without hemodynamic collapse    8/9/16 - Holter - One blocked P wave ( 4.50 AM) ; no PV; SR  2/11/18 to 3/12/18E  cardio event monitor-sinus rhythm/one PVC    F. Miscellaneous:    Subjective:  Ana Ramey is a 70 y.o. female who returns for follow up  Visit. Doing well  No papitations/dizziness  No CP/dyspnea/swelling lower extremities. Chronic fatigue which has not changed significantly. Complains of intermittent cough through the night. Has been evaluated for GERD. ROS:  (bold if positive, if negative)             Medications before admission:    Current Outpatient Medications   Medication Sig Dispense    cholecalciferol (VITAMIN D3) (2,000 UNITS /50 MCG) cap capsule Take  by mouth daily.  busPIRone (BUSPAR) 10 mg tablet Take 10 mg by mouth daily as needed.  alendronate (FOSAMAX) 70 mg tablet Take 1 Tablet by mouth every seven (7) days. 12 Tablet    metoprolol succinate (TOPROL-XL) 25 mg XL tablet Take 1 Tab by mouth daily. 90 Tab    clobetasoL (TEMOVATE) 0.05 % topical cream      fenofibrate nanocrystallized (TRICOR) 145 mg tablet Take 1 Tab by mouth daily. 90 Tab    losartan (COZAAR) 50 mg tablet TAKE 1 TABLET DAILY 90 Tab    escitalopram oxalate (LEXAPRO) 20 mg tablet      atorvastatin (LIPITOR) 10 mg tablet TAKE 1 TABLET NIGHTLY 90 Tab    metFORMIN (GLUCOPHAGE) 1,000 mg tablet Take 1 Tab by mouth two (2) times daily (with meals).  180 Tab    aspirin delayed-release 81 mg tablet Take 81 mg by mouth nightly.  levothyroxine (SYNTHROID) 137 mcg tablet Take 112 mcg by mouth Daily (before breakfast).  CYANOCOBALAMIN (VITAMIN B-12 IJ) by Injection route every fourteen (14) days. SQ     traZODone (DESYREL) 50 mg tablet       No current facility-administered medications for this visit. Physical Exam:  Visit Vitals  /70 (BP 1 Location: Right upper arm, BP Patient Position: Sitting, BP Cuff Size: Adult)   Pulse 80   Ht 5' 4\" (1.626 m)   Wt 184 lb (83.5 kg)   SpO2 97%   BMI 31.58 kg/m²          Gen: Well-developed, well-nourished, in no acute distress  Neck: Supple,No JVD, No Carotid Bruit,   Resp: No accessory muscle use, Clear breath sounds, No rales or rhonchi  Card: Regular Rate,Rythm,Normal S1, S2, No murmurs, rubs or gallop. No thrills.    Abd:  Soft,BS+,   MSK: No cyanosis  Skin: No rashes    Neuro: moving all four extremities , follows commands appropriately  Psych:  Good insight, oriented to person, place , alert, Nml Affect  LE: No edema    EKG -       LABS:        Lab Results   Component Value Date/Time    WBC 9.5 05/07/2021 12:40 PM    HGB 11.9 05/07/2021 12:40 PM    HCT 37.0 05/07/2021 12:40 PM    PLATELET 345 59/90/3635 12:40 PM    MCV 85 05/07/2021 12:40 PM     Lab Results   Component Value Date/Time    Sodium 143 05/07/2021 12:40 PM    Potassium 4.6 05/07/2021 12:40 PM    Chloride 106 05/07/2021 12:40 PM    CO2 21 05/07/2021 12:40 PM    Anion gap 7 04/10/2018 04:36 AM    Glucose 94 05/07/2021 12:40 PM    Glucose 97 03/04/2021 10:32 AM    BUN 13 05/07/2021 12:40 PM    Creatinine 0.63 05/07/2021 12:40 PM    BUN/Creatinine ratio 21 05/07/2021 12:40 PM    GFR est  05/07/2021 12:40 PM    GFR est non-AA 91 05/07/2021 12:40 PM    Calcium 10.0 05/07/2021 12:40 PM       Lab Results   Component Value Date/Time    aPTT 25.2 09/14/2015 12:06 PM     Lab Results   Component Value Date/Time    INR 1.1 03/26/2018 11:47 AM    INR 1.0 09/14/2015 12:06 PM Prothrombin time 11.2 (H) 03/26/2018 11:47 AM    Prothrombin time 10.4 09/14/2015 12:06 PM     No components found for: Julian Munroe MD

## 2021-08-12 NOTE — PROGRESS NOTES
Jovany Duarte is a 70 y.o. female    Visit Vitals  /70 (BP 1 Location: Right upper arm, BP Patient Position: Sitting, BP Cuff Size: Adult)   Pulse 80   Ht 5' 4\" (1.626 m)   Wt 184 lb (83.5 kg)   SpO2 97%   BMI 31.58 kg/m²       Chief Complaint   Patient presents with    Cholesterol Problem    Hypertension    Other     PVC       Chest pain NO  SOB NO  Dizziness NO  Swelling NO  Recent hospital visit NO  Refills NO

## 2021-08-12 NOTE — LETTER
8/12/2021    Patient: Miah Paz   YOB: 1950   Date of Visit: 8/12/2021     Angelica Eaton NP  [address] [facility]  [place] [address] [id number] [facility]    Dear Angelica Eaton NP,      Thank you for referring Ms. Percy Cisneros to [facility] for evaluation. My notes for this consultation are attached. If you have questions, please do not hesitate to call me. I look forward to following your patient along with you.       Sincerely,    Boy Ambrose MD

## 2021-08-26 ENCOUNTER — ANCILLARY PROCEDURE (OUTPATIENT)
Dept: CARDIOLOGY CLINIC | Age: 71
End: 2021-08-26
Payer: MEDICARE

## 2021-08-26 VITALS
SYSTOLIC BLOOD PRESSURE: 136 MMHG | WEIGHT: 184 LBS | DIASTOLIC BLOOD PRESSURE: 74 MMHG | HEIGHT: 64 IN | BODY MASS INDEX: 31.41 KG/M2

## 2021-08-26 DIAGNOSIS — G47.33 OSA ON CPAP: ICD-10-CM

## 2021-08-26 DIAGNOSIS — Z86.79 HX OF CARDIOMYOPATHY: ICD-10-CM

## 2021-08-26 DIAGNOSIS — I10 ESSENTIAL HYPERTENSION: ICD-10-CM

## 2021-08-26 DIAGNOSIS — E78.5 DYSLIPIDEMIA: ICD-10-CM

## 2021-08-26 DIAGNOSIS — Z99.89 OSA ON CPAP: ICD-10-CM

## 2021-08-26 PROCEDURE — 93306 TTE W/DOPPLER COMPLETE: CPT | Performed by: INTERNAL MEDICINE

## 2021-09-02 LAB
ECHO AO ASC DIAM: 3.34 CM
ECHO AO ROOT DIAM: 3.48 CM
ECHO AV AREA PEAK VELOCITY: 2.76 CM2
ECHO AV AREA VTI: 2.6 CM2
ECHO AV AREA/BSA PEAK VELOCITY: 1.5 CM2/M2
ECHO AV AREA/BSA VTI: 1.4 CM2/M2
ECHO AV MEAN GRADIENT: 4.83 MMHG
ECHO AV PEAK GRADIENT: 8.62 MMHG
ECHO AV PEAK VELOCITY: 146.83 CM/S
ECHO AV VTI: 30.04 CM
ECHO EST RA PRESSURE: 3 MMHG
ECHO LA AREA 4C: 21.43 CM2
ECHO LA MAJOR AXIS: 4.26 CM
ECHO LA MINOR AXIS: 2.25 CM
ECHO LA VOL 2C: 71.14 ML (ref 22–52)
ECHO LA VOL 4C: 59.68 ML (ref 22–52)
ECHO LA VOL BP: 69.22 ML (ref 22–52)
ECHO LA VOL/BSA BIPLANE: 36.62 ML/M2 (ref 16–28)
ECHO LA VOLUME INDEX A2C: 37.64 ML/M2 (ref 16–28)
ECHO LA VOLUME INDEX A4C: 31.58 ML/M2 (ref 16–28)
ECHO LV E' LATERAL VELOCITY: 7.73 CM/S
ECHO LV E' SEPTAL VELOCITY: 6.23 CM/S
ECHO LV EDV A4C: 127.32 ML
ECHO LV EDV INDEX A4C: 67.4 ML/M2
ECHO LV EJECTION FRACTION A4C: 63 PERCENT
ECHO LV ESV A4C: 47.52 ML
ECHO LV ESV INDEX A4C: 25.1 ML/M2
ECHO LV INTERNAL DIMENSION DIASTOLIC: 5.64 CM (ref 3.9–5.3)
ECHO LV INTERNAL DIMENSION SYSTOLIC: 3.73 CM
ECHO LV IVSD: 0.94 CM (ref 0.6–0.9)
ECHO LV MASS 2D: 206.6 G (ref 67–162)
ECHO LV MASS INDEX 2D: 109.3 G/M2 (ref 43–95)
ECHO LV POSTERIOR WALL DIASTOLIC: 0.95 CM (ref 0.6–0.9)
ECHO LVOT DIAM: 2.16 CM
ECHO LVOT PEAK GRADIENT: 4.93 MMHG
ECHO LVOT PEAK VELOCITY: 111.03 CM/S
ECHO LVOT SV: 78.1 ML
ECHO LVOT VTI: 21.4 CM
ECHO MV A VELOCITY: 83.88 CM/S
ECHO MV E DECELERATION TIME (DT): 238.27 MS
ECHO MV E VELOCITY: 68.52 CM/S
ECHO MV E/A RATIO: 0.82
ECHO MV E/E' LATERAL: 8.86
ECHO MV E/E' RATIO (AVERAGED): 9.93
ECHO MV E/E' SEPTAL: 11
ECHO MV MAX VELOCITY: 104.03 CM/S
ECHO MV MEAN GRADIENT: 1.85 MMHG
ECHO MV PEAK GRADIENT: 4.33 MMHG
ECHO MV PRESSURE HALF TIME (PHT): 69.1 MS
ECHO MV VTI: 23.6 CM
ECHO RA AREA 4C: 18.72 CM2
ECHO RIGHT VENTRICULAR SYSTOLIC PRESSURE (RVSP): 24.34 MMHG
ECHO RV INTERNAL DIMENSION: 3.66 CM
ECHO RV TAPSE: 2.02 CM (ref 1.5–2)
ECHO TV REGURGITANT MAX VELOCITY: 230.99 CM/S
ECHO TV REGURGITANT PEAK GRADIENT: 21.34 MMHG

## 2021-09-07 ENCOUNTER — OFFICE VISIT (OUTPATIENT)
Dept: FAMILY MEDICINE CLINIC | Age: 71
End: 2021-09-07
Payer: MEDICARE

## 2021-09-07 VITALS
SYSTOLIC BLOOD PRESSURE: 130 MMHG | WEIGHT: 187.38 LBS | DIASTOLIC BLOOD PRESSURE: 78 MMHG | HEART RATE: 77 BPM | HEIGHT: 64 IN | TEMPERATURE: 97.3 F | RESPIRATION RATE: 16 BRPM | OXYGEN SATURATION: 98 % | BODY MASS INDEX: 31.99 KG/M2

## 2021-09-07 DIAGNOSIS — E53.8 B12 DEFICIENCY: ICD-10-CM

## 2021-09-07 DIAGNOSIS — Z78.0 ASYMPTOMATIC POSTMENOPAUSAL STATE: ICD-10-CM

## 2021-09-07 DIAGNOSIS — E78.2 MIXED HYPERLIPIDEMIA WITH APOLIPOPROTEIN E2 VARIANT: ICD-10-CM

## 2021-09-07 DIAGNOSIS — F51.01 PRIMARY INSOMNIA: Primary | ICD-10-CM

## 2021-09-07 DIAGNOSIS — E66.09 CLASS 1 OBESITY DUE TO EXCESS CALORIES WITH SERIOUS COMORBIDITY AND BODY MASS INDEX (BMI) OF 32.0 TO 32.9 IN ADULT: ICD-10-CM

## 2021-09-07 DIAGNOSIS — R73.01 IMPAIRED FASTING GLUCOSE: ICD-10-CM

## 2021-09-07 DIAGNOSIS — I10 ESSENTIAL HYPERTENSION: ICD-10-CM

## 2021-09-07 DIAGNOSIS — M81.0 AGE-RELATED OSTEOPOROSIS WITHOUT CURRENT PATHOLOGICAL FRACTURE: ICD-10-CM

## 2021-09-07 DIAGNOSIS — E03.8 HYPOTHYROIDISM, SECONDARY: ICD-10-CM

## 2021-09-07 DIAGNOSIS — F41.9 ANXIETY: ICD-10-CM

## 2021-09-07 PROCEDURE — 1090F PRES/ABSN URINE INCON ASSESS: CPT | Performed by: NURSE PRACTITIONER

## 2021-09-07 PROCEDURE — G8752 SYS BP LESS 140: HCPCS | Performed by: NURSE PRACTITIONER

## 2021-09-07 PROCEDURE — G8427 DOCREV CUR MEDS BY ELIG CLIN: HCPCS | Performed by: NURSE PRACTITIONER

## 2021-09-07 PROCEDURE — G8432 DEP SCR NOT DOC, RNG: HCPCS | Performed by: NURSE PRACTITIONER

## 2021-09-07 PROCEDURE — G8536 NO DOC ELDER MAL SCRN: HCPCS | Performed by: NURSE PRACTITIONER

## 2021-09-07 PROCEDURE — 1101F PT FALLS ASSESS-DOCD LE1/YR: CPT | Performed by: NURSE PRACTITIONER

## 2021-09-07 PROCEDURE — G8754 DIAS BP LESS 90: HCPCS | Performed by: NURSE PRACTITIONER

## 2021-09-07 PROCEDURE — 99214 OFFICE O/P EST MOD 30 MIN: CPT | Performed by: NURSE PRACTITIONER

## 2021-09-07 PROCEDURE — 3017F COLORECTAL CA SCREEN DOC REV: CPT | Performed by: NURSE PRACTITIONER

## 2021-09-07 PROCEDURE — G8417 CALC BMI ABV UP PARAM F/U: HCPCS | Performed by: NURSE PRACTITIONER

## 2021-09-07 RX ORDER — METFORMIN HYDROCHLORIDE 1000 MG/1
1000 TABLET ORAL
COMMUNITY
End: 2022-01-24 | Stop reason: SDUPTHER

## 2021-09-07 RX ORDER — LEVOTHYROXINE SODIUM 112 UG/1
112 TABLET ORAL
COMMUNITY
End: 2021-10-20 | Stop reason: SDUPTHER

## 2021-09-07 NOTE — PROGRESS NOTES
Chief Complaint   Patient presents with    Follow Up Chronic Condition   1. Have you been to the ER, urgent care clinic since your last visit? Hospitalized since your last visit? No    2. Have you seen or consulted any other health care providers outside of the [address] [place] [address] since your last visit? Include any pap smears or colon screening.  No

## 2021-09-07 NOTE — PROGRESS NOTES
Subjective  Chief Complaint   Patient presents with    Follow Up Chronic Condition     HPI:  Amanda Lagos is a 70 y.o. female. Patient presents for management of insomnia, anxiety, osteoporosis, B12 deficiency, hypothyroidism, and prediabetes. Insomnia is well controlled with trazodone as needed. Anxiety is well controlled with Lexapro daily. Takes Buspar prn, typically 1-2 times per month. Recently switched to Fosamax for osteoporosis due to cost of previous medication. Last bone density 3 to 4 years ago. Self administers B12 every 2 weeks. Metformin 1000 mg twice daily currently being managed by cardiology. She would like for us to take over management of this for prediabetes. Follows with cardiology for management of HTN, PVCs, and hyperlipidemia-atorvastatin, Fenofibrate, losartan, and metoprolol. Follows with GI for management of chronic diarrhea. Follows with neurology for management of idiopathic peripheral neuropathy. Follows with urology for abnormal urine odor and stress incontinence. Recently treated with antibiotics and urine odor has resolved. Follows with endocrinology for management of hypothyroidism. Would like for us to take over management of this medication. Takes levothyroxine daily in a.m. alone with a sip of water.     Past Medical History:   Diagnosis Date    Anxiety     Autonomic dysfunction 12/4/98    tilt test with marked heart rate variablility and drop in BP without hemodynamic collapse    Bladder stone     Cancer [facility]) 1997    left breast lumpectomy , Rx RT    Diabetes ([facility] [address]     pre-diabetic, on metformin    Dilated cardiomyopathy ([facility] [address] 2/8/06    nonischemic    Elevated triglycerides with high cholesterol 11/21/2014    Essential hypertension 2/9/2017    Hx MRSA infection 3/26/2018    [place]vert Doe Hypothyroidism 1994    Dr. Domingo Palomino.    Insulin resistance 11/21/2014    Mixed hyperlipidemia with apolipoprotein E2 variant 11/21/2014    Nausea & vomiting     Osteoarthritis     diffuse. Dr. Mike Merida Other and unspecified hyperlipidemia 12/21/2010    PONV (postoperative nausea and vomiting)     Psychiatric disorder     anxiety    PVC's 1994    Sleep apnea     Not wearing CPAP    Vitamin D deficiency 11/21/2014     Family History   Problem Relation Age of Onset    Kidney Disease Mother     Cancer Mother         ovarian, esophageal cancer    Heart Disease Father     Coronary Artery Disease Brother     No Known Problems Daughter     No Known Problems Daughter     No Known Problems Daughter     No Known Problems Daughter     Heart Attack Paternal Uncle         MI IN 40S    Heart Attack Paternal Grandmother         MI IN 40S    Anesth Problems Neg Hx      Social History     Socioeconomic History    Marital status:      Spouse name: Not on file    Number of children: Not on file    Years of education: Not on file    Highest education level: Not on file   Occupational History    Not on file   Tobacco Use    Smoking status: Never Smoker    Smokeless tobacco: Never Used   Vaping Use    Vaping Use: Never used   Substance and Sexual Activity    Alcohol use: No     Alcohol/week: 0.0 standard drinks    Drug use: No    Sexual activity: Not on file   Other Topics Concern    Not on file   Social History Narrative    Not on file     Social Determinants of Health     Financial Resource Strain:     Difficulty of Paying Living Expenses:    Food Insecurity:     Worried About Running Out of Food in the Last Year:     Ran Out of Food in the Last Year:    Transportation Needs:     Lack of Transportation (Medical):      Lack of Transportation (Non-Medical):    Physical Activity:     Days of Exercise per Week:     Minutes of Exercise per Session:    Stress:     Feeling of Stress :    Social Connections:     Frequency of Communication with Friends and Family:     Frequency of Social Gatherings with Friends and Family:     Attends [employer] Services:  Active Member of Clubs or Organizations:     Attends Club or Organization Meetings:     Marital Status:    Intimate Partner Violence:     Fear of Current or Ex-Partner:     Emotionally Abused:     Physically Abused:     Sexually Abused:      Current Outpatient Medications on File Prior to Visit   Medication Sig Dispense Refill    levothyroxine (SYNTHROID) 112 mcg tablet Take 112 mcg by mouth Daily (before breakfast).  metFORMIN (GLUCOPHAGE) 1,000 mg tablet Take 1,000 mg by mouth daily (with dinner).  cholecalciferol (VITAMIN D3) (2,000 UNITS /50 MCG) cap capsule Take  by mouth daily.  busPIRone (BUSPAR) 10 mg tablet Take 10 mg by mouth daily as needed.  alendronate (FOSAMAX) 70 mg tablet Take 1 Tablet by mouth every seven (7) days. 12 Tablet 3    metoprolol succinate (TOPROL-XL) 25 mg XL tablet Take 1 Tab by mouth daily. 90 Tab 1    traZODone (DESYREL) 50 mg tablet       clobetasoL (TEMOVATE) 0.05 % topical cream       fenofibrate nanocrystallized (TRICOR) 145 mg tablet Take 1 Tab by mouth daily. 90 Tab 0    losartan (COZAAR) 50 mg tablet TAKE 1 TABLET DAILY 90 Tab 0    escitalopram oxalate (LEXAPRO) 20 mg tablet       atorvastatin (LIPITOR) 10 mg tablet TAKE 1 TABLET NIGHTLY 90 Tab 0    aspirin delayed-release 81 mg tablet Take 81 mg by mouth nightly.  CYANOCOBALAMIN (VITAMIN B-12 IJ) by Injection route every fourteen (14) days. SQ      [DISCONTINUED] metFORMIN (GLUCOPHAGE) 1,000 mg tablet Take 1 Tab by mouth two (2) times daily (with meals). 180 Tab 3    [DISCONTINUED] levothyroxine (SYNTHROID) 137 mcg tablet Take 112 mcg by mouth Daily (before breakfast). No current facility-administered medications on file prior to visit. Allergies   Allergen Reactions    Lactose Diarrhea and Nausea Only    Sulfa (Sulfonamide Antibiotics) Hives     ROS  See HPI for pertinent ROS.      Objective  Visit Vitals  /78 (BP 1 Location: Right upper arm, BP Patient Position: Sitting)   Pulse 77   Temp 97.3 °F (36.3 °C) (Temporal)   Resp 16   Ht 5' 4\" (1.626 m)   Wt 187 lb 6 oz (85 kg)   SpO2 98%   BMI 32.16 kg/m²       Physical Exam  Vitals and nursing note reviewed. Constitutional:       General: She is not in acute distress. Appearance: Normal appearance. She is obese. HENT:      Head: Normocephalic. Eyes:      Extraocular Movements: Extraocular movements intact. Cardiovascular:      Rate and Rhythm: Normal rate and regular rhythm. Heart sounds: Normal heart sounds. Pulmonary:      Effort: Pulmonary effort is normal.      Breath sounds: Normal breath sounds. Musculoskeletal:         General: Normal range of motion. Right lower leg: No edema. Left lower leg: No edema. Skin:     General: Skin is warm and dry. Neurological:      Mental Status: She is alert and oriented to person, place, and time. Psychiatric:         Mood and Affect: Mood normal.         Behavior: Behavior normal.          Assessment & Plan      ICD-10-CM ICD-9-CM    1. Primary insomnia  F51.01 307.42    2. Anxiety  F41.9 300.00    3. Age-related osteoporosis without current pathological fracture  M81.0 733.01    4. Asymptomatic postmenopausal state  Z78.0 V49.81 DEXA BONE DENSITY STUDY AXIAL   5. B12 deficiency  E53.8 266.2    6. Impaired fasting glucose  R73.01 790.21    7. Hypothyroidism, secondary  E03.8 244.8    8. Essential hypertension  I10 401.9    9. Mixed hyperlipidemia with apolipoprotein E2 variant  E78.2 272.2    10. Class 1 obesity due to excess calories with serious comorbidity and body mass index (BMI) of 32.0 to 32.9 in adult  E66.09 278.00     Z68.32 V85.32      Diagnoses and all orders for this visit:    1. Primary insomnia  Well-controlled with trazodone as needed. Medication will be refilled when requested. 2. Anxiety  Well-controlled with Lexapro daily and BuSpar as needed. Reminded to take Lexapro daily and do not abruptly discontinue.     3. Age-related osteoporosis without current pathological fracture  Continue to take Fosamax weekly. Overdue for repeat bone density screening. 4. Asymptomatic postmenopausal state  -     DEXA BONE DENSITY STUDY AXIAL; Future    5. B12 deficiency  B12 slightly low when last checked 10/28/2020. We will recheck this with next lab draw. 6. Impaired fasting glucose  A1c 5.8% when checked 10/28/2020 and 5.2% when checked 5/12/2020. We will have her decrease metformin to 500 mg twice daily and we will recheck A1c with next lab draw. Increase regular exercise, avoid sugar, and limit carbohydrates. 7. Hypothyroidism, secondary  Thyroid function normal when last checked 5/14/2021. Continue to take levothyroxine first thing in the morning alone with a sip of water. 8. Essential hypertension  Follows with cardiology for management. BP is below goal in the office today. 9. Mixed hyperlipidemia with apolipoprotein E2 variant  Follows with cardiology for management. Lipids below goal when last checked 10/28/2020-total 156, LDL 71, and triglycerides 186.    10. Class 1 obesity due to excess calories with serious comorbidity and body mass index (BMI) of 32.0 to 32.9 in adult  Discussed the positive impact weight loss, regular exercise, and healthy eating will have for chronic conditions and overall health. Follow-up and Dispositions    · Return in about 3 months (around 12/7/2021) for MCW, fasting labs, follow up, chronic conditions/meds.            Ranjeet Sigala NP

## 2021-09-08 ENCOUNTER — TELEPHONE (OUTPATIENT)
Dept: CARDIOLOGY CLINIC | Age: 71
End: 2021-09-08

## 2021-09-08 NOTE — TELEPHONE ENCOUNTER
Verified patient with two patient identifiers. Called patient and informed her of normal pump function/normal EF Echo test results. Patient verbalized understanding.

## 2021-09-08 NOTE — TELEPHONE ENCOUNTER
----- Message from Jake Fairchild MD sent at 9/4/2021  1:23 PM EDT -----  Echocardiogram-normal pump function/normal EF, mild valvular abnormalities

## 2021-09-21 RX ORDER — LOSARTAN POTASSIUM 50 MG/1
TABLET ORAL
Qty: 90 TABLET | Refills: 0 | Status: SHIPPED | OUTPATIENT
Start: 2021-09-21 | End: 2021-09-25 | Stop reason: SDUPTHER

## 2021-09-23 ENCOUNTER — TELEPHONE (OUTPATIENT)
Dept: FAMILY MEDICINE CLINIC | Age: 71
End: 2021-09-23

## 2021-09-23 DIAGNOSIS — F41.9 ANXIETY: Primary | ICD-10-CM

## 2021-09-24 RX ORDER — ESCITALOPRAM OXALATE 20 MG/1
20 TABLET ORAL DAILY
Qty: 90 TABLET | Refills: 2 | Status: SHIPPED | OUTPATIENT
Start: 2021-09-24 | End: 2021-09-25 | Stop reason: SDUPTHER

## 2021-09-24 RX ORDER — ESCITALOPRAM OXALATE 20 MG/1
20 TABLET ORAL DAILY
Qty: 90 TABLET | Refills: 2 | Status: CANCELLED | OUTPATIENT
Start: 2021-09-24

## 2021-09-25 DIAGNOSIS — I49.3 PVC (PREMATURE VENTRICULAR CONTRACTION): ICD-10-CM

## 2021-09-25 DIAGNOSIS — I10 ESSENTIAL HYPERTENSION: ICD-10-CM

## 2021-09-25 DIAGNOSIS — E78.2 ELEVATED TRIGLYCERIDES WITH HIGH CHOLESTEROL: ICD-10-CM

## 2021-09-25 DIAGNOSIS — F41.9 ANXIETY: ICD-10-CM

## 2021-09-27 DIAGNOSIS — E78.2 ELEVATED TRIGLYCERIDES WITH HIGH CHOLESTEROL: ICD-10-CM

## 2021-09-27 DIAGNOSIS — F41.9 ANXIETY: ICD-10-CM

## 2021-09-27 DIAGNOSIS — I10 ESSENTIAL HYPERTENSION: ICD-10-CM

## 2021-09-27 DIAGNOSIS — I49.3 PVC (PREMATURE VENTRICULAR CONTRACTION): ICD-10-CM

## 2021-09-27 RX ORDER — LOSARTAN POTASSIUM 50 MG/1
TABLET ORAL
Qty: 90 TABLET | Refills: 1 | Status: SHIPPED | OUTPATIENT
Start: 2021-09-27 | End: 2021-09-27 | Stop reason: SDUPTHER

## 2021-09-27 RX ORDER — ESCITALOPRAM OXALATE 20 MG/1
20 TABLET ORAL DAILY
Qty: 90 TABLET | Refills: 2 | Status: SHIPPED | OUTPATIENT
Start: 2021-09-27 | End: 2021-10-20 | Stop reason: SDUPTHER

## 2021-09-27 RX ORDER — FENOFIBRATE 145 MG/1
145 TABLET, COATED ORAL DAILY
Qty: 90 TABLET | Refills: 1 | Status: SHIPPED | OUTPATIENT
Start: 2021-09-27 | End: 2021-09-27 | Stop reason: SDUPTHER

## 2021-09-28 RX ORDER — FENOFIBRATE 145 MG/1
145 TABLET, COATED ORAL DAILY
Qty: 90 TABLET | Refills: 1 | Status: SHIPPED | OUTPATIENT
Start: 2021-09-28 | End: 2021-12-08 | Stop reason: SDUPTHER

## 2021-09-28 RX ORDER — LOSARTAN POTASSIUM 50 MG/1
TABLET ORAL
Qty: 90 TABLET | Refills: 1 | Status: SHIPPED | OUTPATIENT
Start: 2021-09-28 | End: 2021-12-08 | Stop reason: SDUPTHER

## 2021-09-28 RX ORDER — ESCITALOPRAM OXALATE 20 MG/1
20 TABLET ORAL DAILY
Qty: 90 TABLET | Refills: 2 | OUTPATIENT
Start: 2021-09-28

## 2021-10-19 ENCOUNTER — TELEPHONE (OUTPATIENT)
Dept: CARDIOLOGY CLINIC | Age: 71
End: 2021-10-19

## 2021-10-19 NOTE — TELEPHONE ENCOUNTER
----- Message from Michelle Barrera LPN sent at 38/92/9605  4:36 PM EDT -----  Regarding: FW: Non-Urgent Medical Question  Contact: [phone]    ----- Message -----  From: Irene Ruiz LPN  Sent: 58/77/0725   3:17 PM EDT  To: Michelle Barrera LPN  Subject: FW: Non-Urgent Medical Question                    ----- Message -----  From: Cher Clay  Sent: 10/19/2021   2:45 PM EDT  To: [facility] Nurse [place]  Subject: Non-Urgent Medical Question                      I am having a lot of pain in my left leg a lot behind my knee. My leg feels really tight and very uncomfortable. And some in right leg not as bad. Do you think I should come in and get it checked. Thank you.  I worry about a blood clot

## 2021-10-21 ENCOUNTER — OFFICE VISIT (OUTPATIENT)
Dept: CARDIOLOGY CLINIC | Age: 71
End: 2021-10-21
Payer: MEDICARE

## 2021-10-21 ENCOUNTER — ANCILLARY PROCEDURE (OUTPATIENT)
Dept: CARDIOLOGY CLINIC | Age: 71
End: 2021-10-21
Payer: MEDICARE

## 2021-10-21 VITALS
HEART RATE: 86 BPM | HEIGHT: 64 IN | OXYGEN SATURATION: 97 % | RESPIRATION RATE: 14 BRPM | WEIGHT: 179.2 LBS | BODY MASS INDEX: 30.59 KG/M2 | SYSTOLIC BLOOD PRESSURE: 128 MMHG | DIASTOLIC BLOOD PRESSURE: 72 MMHG

## 2021-10-21 DIAGNOSIS — M79.605 LEFT LEG PAIN: ICD-10-CM

## 2021-10-21 DIAGNOSIS — M79.89 LEG SWELLING: Primary | ICD-10-CM

## 2021-10-21 DIAGNOSIS — E78.5 DYSLIPIDEMIA: ICD-10-CM

## 2021-10-21 DIAGNOSIS — I10 ESSENTIAL HYPERTENSION: ICD-10-CM

## 2021-10-21 DIAGNOSIS — Z86.79 HX OF CARDIOMYOPATHY: ICD-10-CM

## 2021-10-21 DIAGNOSIS — R60.0 LEG EDEMA, LEFT: ICD-10-CM

## 2021-10-21 PROCEDURE — 99214 OFFICE O/P EST MOD 30 MIN: CPT | Performed by: NURSE PRACTITIONER

## 2021-10-21 PROCEDURE — 1090F PRES/ABSN URINE INCON ASSESS: CPT | Performed by: NURSE PRACTITIONER

## 2021-10-21 PROCEDURE — G8432 DEP SCR NOT DOC, RNG: HCPCS | Performed by: NURSE PRACTITIONER

## 2021-10-21 PROCEDURE — G8427 DOCREV CUR MEDS BY ELIG CLIN: HCPCS | Performed by: NURSE PRACTITIONER

## 2021-10-21 PROCEDURE — G8752 SYS BP LESS 140: HCPCS | Performed by: NURSE PRACTITIONER

## 2021-10-21 PROCEDURE — 93010 ELECTROCARDIOGRAM REPORT: CPT | Performed by: NURSE PRACTITIONER

## 2021-10-21 PROCEDURE — G8536 NO DOC ELDER MAL SCRN: HCPCS | Performed by: NURSE PRACTITIONER

## 2021-10-21 PROCEDURE — 1101F PT FALLS ASSESS-DOCD LE1/YR: CPT | Performed by: NURSE PRACTITIONER

## 2021-10-21 PROCEDURE — 93005 ELECTROCARDIOGRAM TRACING: CPT | Performed by: NURSE PRACTITIONER

## 2021-10-21 PROCEDURE — G8754 DIAS BP LESS 90: HCPCS | Performed by: NURSE PRACTITIONER

## 2021-10-21 PROCEDURE — G8417 CALC BMI ABV UP PARAM F/U: HCPCS | Performed by: NURSE PRACTITIONER

## 2021-10-21 PROCEDURE — 93971 EXTREMITY STUDY: CPT | Performed by: INTERNAL MEDICINE

## 2021-10-21 PROCEDURE — 3017F COLORECTAL CA SCREEN DOC REV: CPT | Performed by: NURSE PRACTITIONER

## 2021-10-21 PROCEDURE — G0463 HOSPITAL OUTPT CLINIC VISIT: HCPCS | Performed by: NURSE PRACTITIONER

## 2021-10-21 NOTE — PROGRESS NOTES
ELI Carvalho  [id number] Trejo, [address] [place]  [place], [address] [facility]    Office [phone]  Fax [phone]  . Hans Calderon is a 70 y.o. female is here for f/u visit - concerned about leg pain. Assessment:  Acute left leg pain / swelling    Hx of PVC/nonsustained VT - was on Mexiletine. It was discontinued because Holter monitor did not show any PVCs/significant abnormal rhythms. E cardio event monitor after stopping mexiletine (2018)-sinus rhythm/1 PVC/no significant arrhythmias    Hx of non ischemic cardiomyopathy  Hx of orthostasis/dizziness - currently doing better  HTN  HLD  Chronic fatigue      Plan:   Left leg pain - low suspicion for clot - no swelling in office  Will eval with left LE duplex  BP normotensive on current meds   Echocardiogram 8/21 with nml LVEF; G1DD. Mild MR/TR  Appears euvolemic - breathing stable  Lipids followed by Dr. Gardner-endocrinologist   Aggressive cardiovascular risk factor modification. Advised to lose weight. Follow-up in 6 months or earlier as needed    Patient understands the plan. All questions were answered to the patient's satisfaction. Medication Side Effects and Warnings were discussed with patient: yes  Patient Labs were reviewed and or requested:  yes  Patient Past Records were reviewed and or requested: yes    I appreciate the opportunity to be involved in Ms. Eduardo Lopez. See note below for details. Please do not hesitate to contact us with questions or concerns. Vanessa Delgado NP    Cardiac Testing/ Procedures: A. Cardiac Cath/PCI:    B.ECHO/ISAAC:    3/12/20 -EF 55-60%/Grade 1 DD/Trace MR  11/8/17 - EF 55-60%/Grade 1 DD  10/5/16-EF 84-76%, grade 1 diastolic dysfunction, mild TR  11/21/14Left ventricle: Systolic function was normal. Ejection fraction was  estimated to be 55 %. There were no regional wall motion abnormalities.   Doppler parameters were consistent with abnormal left ventricular  relaxation (grade 1 diastolic dysfunction). Left atrium: The atrium was mildly dilated. Mitral valve: There was mild regurgitation. Tricuspid valve: There was mild regurgitation. 10/2013 - EF 55%    7/2007 - EF 45%    C. StressNuclear/Stress ECHO/Stress test: [facility] Stress ECHO 9/29/20 - 4.1 min  11/8/17 - Treadmill test - 4.39 min/nml  04/99 - Persantine cardiolite - EF 48%/No inducible ischemia      D. Vascular: 3/12/20 - Nml ERIC bilat    E. EP: Hx on nonsustained VT/PVC- was on mexilitine  12/4/98 - tilt test with marked heart rate variablility and drop in BP without hemodynamic collapse    8/9/16 - Holter - One blocked P wave ( 4.50 AM) ; no PV; SR  2/11/18 to 3/12/18E  cardio event monitor-sinus rhythm/one PVC    F. Miscellaneous:    Subjective:  Sebas Kyle is a 70 y.o. female who is here with c/o left leg pain x2 weeks. Hx of LE pain as well as neuropathy but states this is different. Pain was so severe the other night, she could not sleep. Hit her leg with car door 3 weeks ago and wonders if this is contributing. Pt very concerned about possible clot. Reports PATTI (L>R) the past few weeks - unable to get shoes off the other day. Left leg feels very tight, tender to touch. No swelling today in office. ROS:  (Positive findings above)  Constitutional: Negative for fever, chills, and diaphoresis. Respiratory: Negative for cough, hemoptysis, sputum production, shortness of breath and wheezing. Cardiovascular: Negative for chest pain, palpitations, and PND. Gastrointestinal: Negative for nausea, vomiting, blood in stool and melena. Genitourinary: Negative for dysuria and flank pain. Neurological: Negative for focal weakness, seizures, loss of consciousness  Endo/Heme/Allergies: Negative for abnormal bleeding. Psychiatric/Behavioral: Negative for memory loss.         Medications before admission:    Current Outpatient Medications   Medication Sig Dispense    escitalopram oxalate (LEXAPRO) 20 mg tablet Take 1 Tablet by mouth daily. 90 Tablet    levothyroxine (SYNTHROID) 112 mcg tablet Take 1 Tablet by mouth Daily (before breakfast). 90 Tablet    fenofibrate nanocrystallized (TRICOR) 145 mg tablet Take 1 Tablet by mouth daily. 90 Tablet    losartan (COZAAR) 50 mg tablet TAKE 1 TABLET DAILY 90 Tablet    metFORMIN (GLUCOPHAGE) 1,000 mg tablet Take 1,000 mg by mouth daily (with dinner).  cholecalciferol (VITAMIN D3) (2,000 UNITS /50 MCG) cap capsule Take  by mouth daily.  busPIRone (BUSPAR) 10 mg tablet Take 10 mg by mouth daily as needed.  alendronate (FOSAMAX) 70 mg tablet Take 1 Tablet by mouth every seven (7) days. 12 Tablet    metoprolol succinate (TOPROL-XL) 25 mg XL tablet Take 1 Tab by mouth daily. 90 Tab    traZODone (DESYREL) 50 mg tablet      clobetasoL (TEMOVATE) 0.05 % topical cream      atorvastatin (LIPITOR) 10 mg tablet TAKE 1 TABLET NIGHTLY 90 Tab    aspirin delayed-release 81 mg tablet Take 81 mg by mouth nightly.  CYANOCOBALAMIN (VITAMIN B-12 IJ) by Injection route every fourteen (14) days. SQ      No current facility-administered medications for this visit. Physical Exam:  Visit Vitals  /72 (BP 1 Location: Left upper arm, BP Patient Position: Sitting)   Pulse 86   Resp 14   Ht 5' 4\" (1.626 m)   Wt 179 lb 3.2 oz (81.3 kg)   SpO2 97%   BMI 30.76 kg/m²     General - well developed well nourished  Neck - neck supple, no JVD  Cardiac - normal S1, S2, RRR, no murmurs, rubs or gallops.  No clicks  Vascular - radials pulses equal bilateral  Lungs - clear to auscultation bilaterals, no rales, wheezing or rhonchi  Abd - soft nontender, non-distended, +BS  Extremities - no edema, warm  Neuro - nonfocal  Psych - normal mood and affect      LABS:    Lab Results   Component Value Date/Time    Sodium 143 05/07/2021 12:40 PM    Potassium 4.6 05/07/2021 12:40 PM    Chloride 106 05/07/2021 12:40 PM    CO2 21 05/07/2021 12:40 PM    Anion gap 7 04/10/2018 04:36 AM    Glucose 94 05/07/2021 12:40 PM    Glucose 97 03/04/2021 10:32 AM    BUN 13 05/07/2021 12:40 PM    Creatinine 0.63 05/07/2021 12:40 PM    BUN/Creatinine ratio 21 05/07/2021 12:40 PM    GFR est  05/07/2021 12:40 PM    GFR est non-AA 91 05/07/2021 12:40 PM    Calcium 10.0 05/07/2021 12:40 PM    Bilirubin, total 0.3 12/04/2018 09:37 AM    Alk.  phosphatase 42 12/04/2018 09:37 AM    Protein, total 7.0 02/24/2021 10:49 AM    Albumin 4.7 12/04/2018 09:37 AM    Globulin 3.3 09/14/2015 12:06 PM    A-G Ratio 1.7 02/01/2017 09:37 AM    ALT (SGPT) 8 12/04/2018 09:37 AM    AST (SGOT) 17 12/04/2018 09:37 AM     Lab Results   Component Value Date/Time    WBC 9.5 05/07/2021 12:40 PM    HGB 11.9 05/07/2021 12:40 PM    HCT 37.0 05/07/2021 12:40 PM    PLATELET 049 39/55/0683 12:40 PM    MCV 85 05/07/2021 12:40 PM     Lab Results   Component Value Date/Time    Cholesterol, total 180 12/04/2018 09:37 AM    HDL Cholesterol 42 12/04/2018 09:37 AM    LDL, calculated 102 (H) 12/04/2018 09:37 AM    VLDL, calculated 36 12/04/2018 09:37 AM    Triglyceride 180 (H) 12/04/2018 09:37 AM     Lab Results   Component Value Date/Time    TSH 0.864 02/07/2018 07:54 AM    T4, Free 1.55 03/09/2016 09:17 AM     Lab Results   Component Value Date/Time    Hemoglobin A1c 5.4 03/26/2018 11:47 AM     No results found for: CPK, RCK1, RCK2, RCK3, RCK4, CKMB, CKNDX, CKND1, TROPT, TROIQ, BNPP, BNP      Laura Morton NP

## 2021-10-21 NOTE — PROGRESS NOTES
Room A 9  Visit Vitals  /72 (BP 1 Location: Left upper arm, BP Patient Position: Sitting)   Pulse 86   Resp 14   Ht 5' 4\" (1.626 m)   Wt 179 lb 3.2 oz (81.3 kg)   SpO2 97%   BMI 30.76 kg/m²       Pain at the back of knee on left leg, hit leg on car door about 3 weeks ago  Chest pain: no  Shortness of breath: no  Edema: yes, left worse than right  Palpitations, Skipped beats, Rapid heartbeat: no  Dizziness: yes, brief episode where the room was spinning  Fatigue:no    New diagnosis/Surgeries: no    ER/Hospitalizations: no    Refills:no

## 2021-10-21 NOTE — PATIENT INSTRUCTIONS
I will set you up for a left leg ultrasound to rule out clot in the near future    Keep follow-up as prev scheduled with MD.

## 2021-11-04 NOTE — PROGRESS NOTES
Called patient ID verified X2 reviewed below results per Fred Love NP    Duplex of LLE was without DVT - this is great news!       Patient verbalized understanding.

## 2021-11-30 ENCOUNTER — TELEPHONE (OUTPATIENT)
Dept: FAMILY MEDICINE CLINIC | Age: 71
End: 2021-11-30

## 2021-11-30 NOTE — TELEPHONE ENCOUNTER
----- Message from Emily Etienne sent at 11/30/2021 10:45 AM EST -----  Subject: Referral Request    QUESTIONS   Reason for referral request? Pt needs orders for fasting lab work before   her appt on 12/7. Has the physician seen you for this condition before? No   Preferred Specialist (if applicable)? Do you already have an appointment scheduled? No  Additional Information for Provider?   ---------------------------------------------------------------------------  --------------  CALL BACK INFO  What is the best way for the office to contact you? OK to leave message on   voicemail  Preferred Call Back Phone Number?  [phone]

## 2021-11-30 NOTE — TELEPHONE ENCOUNTER
The only thing I see we will need to check is A1c. We may be able to check it by fingerstick at the visit.

## 2021-12-07 ENCOUNTER — OFFICE VISIT (OUTPATIENT)
Dept: FAMILY MEDICINE CLINIC | Age: 71
End: 2021-12-07
Payer: MEDICARE

## 2021-12-07 VITALS
BODY MASS INDEX: 31.92 KG/M2 | RESPIRATION RATE: 16 BRPM | TEMPERATURE: 98.8 F | SYSTOLIC BLOOD PRESSURE: 130 MMHG | DIASTOLIC BLOOD PRESSURE: 66 MMHG | HEIGHT: 64 IN | WEIGHT: 187 LBS | HEART RATE: 100 BPM

## 2021-12-07 DIAGNOSIS — I10 ESSENTIAL HYPERTENSION: ICD-10-CM

## 2021-12-07 DIAGNOSIS — Z00.00 MEDICARE ANNUAL WELLNESS VISIT, SUBSEQUENT: Primary | ICD-10-CM

## 2021-12-07 DIAGNOSIS — Z71.89 ADVANCED DIRECTIVES, COUNSELING/DISCUSSION: ICD-10-CM

## 2021-12-07 DIAGNOSIS — E78.2 MIXED HYPERLIPIDEMIA WITH APOLIPOPROTEIN E2 VARIANT: ICD-10-CM

## 2021-12-07 DIAGNOSIS — Z23 ENCOUNTER FOR IMMUNIZATION: ICD-10-CM

## 2021-12-07 DIAGNOSIS — E03.8 HYPOTHYROIDISM, SECONDARY: ICD-10-CM

## 2021-12-07 DIAGNOSIS — E66.09 CLASS 1 OBESITY DUE TO EXCESS CALORIES WITH SERIOUS COMORBIDITY AND BODY MASS INDEX (BMI) OF 32.0 TO 32.9 IN ADULT: ICD-10-CM

## 2021-12-07 DIAGNOSIS — Z12.11 COLON CANCER SCREENING: ICD-10-CM

## 2021-12-07 DIAGNOSIS — Z91.81 AT LOW RISK FOR FALL: ICD-10-CM

## 2021-12-07 DIAGNOSIS — R73.01 IMPAIRED FASTING GLUCOSE: ICD-10-CM

## 2021-12-07 DIAGNOSIS — Z11.59 ENCOUNTER FOR HEPATITIS C SCREENING TEST FOR LOW RISK PATIENT: ICD-10-CM

## 2021-12-07 DIAGNOSIS — Z13.31 DEPRESSION SCREENING: ICD-10-CM

## 2021-12-07 DIAGNOSIS — Z13.39 ALCOHOL SCREENING: ICD-10-CM

## 2021-12-07 PROCEDURE — G8754 DIAS BP LESS 90: HCPCS | Performed by: NURSE PRACTITIONER

## 2021-12-07 PROCEDURE — G8432 DEP SCR NOT DOC, RNG: HCPCS | Performed by: NURSE PRACTITIONER

## 2021-12-07 PROCEDURE — 1101F PT FALLS ASSESS-DOCD LE1/YR: CPT | Performed by: NURSE PRACTITIONER

## 2021-12-07 PROCEDURE — 1090F PRES/ABSN URINE INCON ASSESS: CPT | Performed by: NURSE PRACTITIONER

## 2021-12-07 PROCEDURE — G8427 DOCREV CUR MEDS BY ELIG CLIN: HCPCS | Performed by: NURSE PRACTITIONER

## 2021-12-07 PROCEDURE — G8536 NO DOC ELDER MAL SCRN: HCPCS | Performed by: NURSE PRACTITIONER

## 2021-12-07 PROCEDURE — G0439 PPPS, SUBSEQ VISIT: HCPCS | Performed by: NURSE PRACTITIONER

## 2021-12-07 PROCEDURE — 3017F COLORECTAL CA SCREEN DOC REV: CPT | Performed by: NURSE PRACTITIONER

## 2021-12-07 PROCEDURE — G8417 CALC BMI ABV UP PARAM F/U: HCPCS | Performed by: NURSE PRACTITIONER

## 2021-12-07 PROCEDURE — G8752 SYS BP LESS 140: HCPCS | Performed by: NURSE PRACTITIONER

## 2021-12-07 PROCEDURE — 99214 OFFICE O/P EST MOD 30 MIN: CPT | Performed by: NURSE PRACTITIONER

## 2021-12-07 NOTE — PROGRESS NOTES
Medicare Wellness Exam:    Chief Complaint   Patient presents with    Annual Wellness Visit     she is a 70y.o. year old female who presents for evaluation for their Medicare Wellness Visit. Patient presents for Medicare wellness, labs, and management of prediabetes and hypothyroidism. She was seen 9/7/2021 for chronic disease management. Metformin was decreased from 2000 mg to 1000 mg, due to have A1c rechecked today. Fall Screen is completed and assessed=yes  Depression Screen is completed and assessed=yes  Medication list reviewed and adjusted for accuracy=yes  Immunizations reviewed and updated=yes  Health/Preventative Screenings reviewed and updated=yes  ADL Functions reviewed=yes  MiniCog score= 5/5 (2points for clock, 3/3 for recall)  See scanned medicare wellness documents for full details. Patient Active Problem List    Diagnosis    Impaired fasting glucose    Age-related osteoporosis without current pathological fracture    Arthritis of right knee    Hx MRSA infection     L GROIN      Lumbar radiculopathy, right    Sensorimotor neuropathy    Radicular syndrome of right leg    Essential hypertension    Hx of cardiomyopathy    Fibromyalgia    Spinal stenosis of cervical region    Facet arthropathy, cervical    Lumbar facet arthropathy    Mixed hyperlipidemia with apolipoprotein E2 variant    Insulin resistance    Vitamin D deficiency    Elevated triglycerides with high cholesterol    PVC (premature ventricular contraction)    Hypothyroidism, secondary       Reviewed PmHx, RxHx, FmHx, SocHx, AllgHx and updated and dated in the chart. Review of Systems   Constitutional: Negative for chills, fever and weight loss. HENT: Negative for congestion, ear pain, hearing loss, sinus pain and sore throat. Denies difficulty swallowing. Eyes: Negative for blurred vision. Respiratory: Negative for cough, shortness of breath and wheezing.     Cardiovascular: Negative for chest pain, palpitations and leg swelling. Gastrointestinal: Positive for constipation. Negative for abdominal pain, diarrhea and heartburn. Genitourinary: Negative for dysuria. Musculoskeletal: Positive for joint pain and neck pain. Negative for myalgias. Neurological: Negative for dizziness, tingling, weakness and headaches. Psychiatric/Behavioral: Negative for depression. The patient is not nervous/anxious. Objective:     Vitals:    12/07/21 1406   BP: 130/66   Pulse: 100   Resp: 16   Temp: 98.8 °F (37.1 °C)   TempSrc: Temporal   Weight: 187 lb (84.8 kg)   Height: 5' 4\" (1.626 m)     Physical Exam  Vitals and nursing note reviewed. Constitutional:       General: She is not in acute distress. Appearance: Normal appearance. HENT:      Head: Normocephalic. Eyes:      Extraocular Movements: Extraocular movements intact. Neck:      Thyroid: No thyroid mass, thyromegaly or thyroid tenderness. Cardiovascular:      Rate and Rhythm: Normal rate and regular rhythm. Heart sounds: Normal heart sounds. Pulmonary:      Effort: Pulmonary effort is normal.      Breath sounds: Normal breath sounds. Chest:   Breasts:      Right: No supraclavicular adenopathy. Left: No supraclavicular adenopathy. Musculoskeletal:         General: Normal range of motion. Cervical back: Neck supple. Right lower leg: No edema. Left lower leg: No edema. Lymphadenopathy:      Cervical: No cervical adenopathy. Upper Body:      Right upper body: No supraclavicular adenopathy. Left upper body: No supraclavicular adenopathy. Skin:     General: Skin is warm and dry. Neurological:      Mental Status: She is alert and oriented to person, place, and time. Psychiatric:         Mood and Affect: Mood normal.         Behavior: Behavior normal.          Assessment/ Plan:   Diagnoses and all orders for this visit:    1.  Medicare annual wellness visit, subsequent  W exam completed as documented. 2. Depression screening  Negative depression screening. 3. Alcohol screening  Low risk for alcohol misuse. 4. At low risk for fall  Low fall risk. 5. Advanced directives, counseling/discussion  Discussed the purpose and importance of advanced directives and encouraged to complete at his earliest convenience. Once complete, provide a copy to us for medical record. Handouts provided. 6. Class 1 obesity due to excess calories with serious comorbidity and body mass index (BMI) of 32.0 to 32.9 in adult  Weight is up 8 pounds in the office today but only 3 pounds on home scale. Increase regular exercise with goal of 150 minutes/week of moderate intensity. Continue to eat a healthy diet. 7. Encounter for immunization  Advised to receive Tenivac vaccine from pharmacy and cautioned there may be an out-of-pocket expense. 8. Colon cancer screening  Screening completed last year by GI. Unable to perform colonoscopy and additional screening by imaging is reported to be normal.    9. Encounter for hepatitis C screening test for low risk patient  -     HCV AB W/RFLX TO MARLO    10. Impaired fasting glucose  Checking A1c today since decreasing Metformin from 2000 to 1000 mg daily. Continue to work on weight loss, increasing regular exercise, and low sugar/carb intake.  -     HEMOGLOBIN A1C WITH EAG    11. Hypothyroidism, secondary  Take med daily at the same time on an empty stomach, at least 30 minutes before or two hours after a meal, and separate from other meds including OTC, minerals, and vitamins by at least 2 hours. -     TSH 3RD GENERATION    12. Essential hypertension  Follows with cardiology for management. BP is below goal in the office today. Checking labs at patient request for cardiologist.  -     METABOLIC PANEL, COMPREHENSIVE    13. Mixed hyperlipidemia with apolipoprotein E2 variant  Follows with cardiology for management.   Checking labs at patient request radiologist.  -     CBC WITH AUTOMATED DIFF  -     LIPID PANEL         -Pain evaluation performed in office  -Cognitive Screen performed in office  -Depression Screen, Fall risks (by up and go test)  and ADL functionality were addressed  -Medication list updated and reviewed for any changes   -A comprehensive review of medical issues and a plan was formulated  -End of life planning was addressed with pt   -Health Screenings for preventions were addressed and a plan was formulated  -Shingles Vaccine was recommended  -Discussed with patient cancer risk factors and appropriate screenings for age  -Patient evaluated for colonoscopy and referred if needed per screeing criteria  -Labs from previous visits were discussed with patient   -Discussed with patient diet and exercise and formulated a plan as needed  -An Advanced care plan was developed with the patient.  -Alcohol screening performed and was negative    -  Follow-up and Dispositions    · Return in about 1 year (around 12/7/2022) for Schuyler Babin, fasting labs, follow up, chronic conditions/meds. I have discussed the diagnosis with the patient and the intended plan as seen in the above orders. The patient understands and agrees with the plan. The patient has received an after-visit summary and questions were answered concerning future plans.      Medication Side Effects and Warnings were discussed with patien  Patient Labs were reviewed and or requested  Patient Past Records were reviewed and or requested        Valdes Nice FNP-C

## 2021-12-07 NOTE — PROGRESS NOTES
Chief Complaint   Patient presents with   [facility] Annual Wellness Visit   1. Have you been to the ER, urgent care clinic since your last visit? Hospitalized since your last visit? No    2. Have you seen or consulted any other health care providers outside of the [address] [place] [address] since your last visit? Include any pap smears or colon screening.  No   3 most recent PHQ Screens 12/7/2021   Little interest or pleasure in doing things Not at all   Feeling down, depressed, irritable, or hopeless Not at all   Total Score PHQ 2 0     Visit Vitals  /66 (BP 1 Location: Right upper arm, BP Patient Position: Sitting)   Pulse 100   Temp 98.8 °F (37.1 °C) (Temporal)   Resp 16   Ht 5' 4\" (1.626 m)   Wt 187 lb (84.8 kg)   BMI 32.10 kg/m²

## 2021-12-08 DIAGNOSIS — E78.5 DYSLIPIDEMIA: ICD-10-CM

## 2021-12-08 DIAGNOSIS — F41.9 ANXIETY: ICD-10-CM

## 2021-12-08 LAB
ALBUMIN SERPL-MCNC: 4.9 G/DL (ref 3.7–4.7)
ALBUMIN/GLOB SERPL: 1.8 {RATIO} (ref 1.2–2.2)
ALP SERPL-CCNC: 39 IU/L (ref 44–121)
ALT SERPL-CCNC: 17 IU/L (ref 0–32)
AST SERPL-CCNC: 20 IU/L (ref 0–40)
BASOPHILS # BLD AUTO: 0.1 X10E3/UL (ref 0–0.2)
BASOPHILS NFR BLD AUTO: 1 %
BILIRUB SERPL-MCNC: <0.2 MG/DL (ref 0–1.2)
BUN SERPL-MCNC: 14 MG/DL (ref 8–27)
BUN/CREAT SERPL: 22 (ref 12–28)
CALCIUM SERPL-MCNC: 9.5 MG/DL (ref 8.7–10.3)
CHLORIDE SERPL-SCNC: 104 MMOL/L (ref 96–106)
CHOLEST SERPL-MCNC: 214 MG/DL (ref 100–199)
CO2 SERPL-SCNC: 20 MMOL/L (ref 20–29)
CREAT SERPL-MCNC: 0.64 MG/DL (ref 0.57–1)
EOSINOPHIL # BLD AUTO: 0.3 X10E3/UL (ref 0–0.4)
EOSINOPHIL NFR BLD AUTO: 3 %
ERYTHROCYTE [DISTWIDTH] IN BLOOD BY AUTOMATED COUNT: 14.4 % (ref 11.7–15.4)
EST. AVERAGE GLUCOSE BLD GHB EST-MCNC: 114 MG/DL
GLOBULIN SER CALC-MCNC: 2.7 G/DL (ref 1.5–4.5)
GLUCOSE SERPL-MCNC: 120 MG/DL (ref 65–99)
HBA1C MFR BLD: 5.6 % (ref 4.8–5.6)
HCT VFR BLD AUTO: 35.5 % (ref 34–46.6)
HCV AB S/CO SERPL IA: <0.1 S/CO RATIO (ref 0–0.9)
HCV AB SERPL QL IA: NORMAL
HDLC SERPL-MCNC: 36 MG/DL
HGB BLD-MCNC: 11.3 G/DL (ref 11.1–15.9)
IMM GRANULOCYTES # BLD AUTO: 0.1 X10E3/UL (ref 0–0.1)
IMM GRANULOCYTES NFR BLD AUTO: 1 %
LDLC SERPL CALC-MCNC: 117 MG/DL (ref 0–99)
LYMPHOCYTES # BLD AUTO: 2.5 X10E3/UL (ref 0.7–3.1)
LYMPHOCYTES NFR BLD AUTO: 28 %
MCH RBC QN AUTO: 27.4 PG (ref 26.6–33)
MCHC RBC AUTO-ENTMCNC: 31.8 G/DL (ref 31.5–35.7)
MCV RBC AUTO: 86 FL (ref 79–97)
MONOCYTES # BLD AUTO: 0.8 X10E3/UL (ref 0.1–0.9)
MONOCYTES NFR BLD AUTO: 9 %
NEUTROPHILS # BLD AUTO: 5.1 X10E3/UL (ref 1.4–7)
NEUTROPHILS NFR BLD AUTO: 58 %
PLATELET # BLD AUTO: 361 X10E3/UL (ref 150–450)
POTASSIUM SERPL-SCNC: 4.3 MMOL/L (ref 3.5–5.2)
PROT SERPL-MCNC: 7.6 G/DL (ref 6–8.5)
RBC # BLD AUTO: 4.12 X10E6/UL (ref 3.77–5.28)
SODIUM SERPL-SCNC: 141 MMOL/L (ref 134–144)
TRIGL SERPL-MCNC: 347 MG/DL (ref 0–149)
TSH SERPL DL<=0.005 MIU/L-ACNC: 3.97 UIU/ML (ref 0.45–4.5)
VLDLC SERPL CALC-MCNC: 61 MG/DL (ref 5–40)
WBC # BLD AUTO: 8.8 X10E3/UL (ref 3.4–10.8)

## 2021-12-08 RX ORDER — ATORVASTATIN CALCIUM 20 MG/1
TABLET, FILM COATED ORAL
Qty: 90 TABLET | Refills: 0 | Status: SHIPPED | OUTPATIENT
Start: 2021-12-08 | End: 2021-12-08 | Stop reason: SDUPTHER

## 2021-12-08 NOTE — PROGRESS NOTES
Labs sent to portal but she is also requesting a phone call. The 10-year ASCVD risk score (Rashaad Garner., et al., 2013) is: 15.5%    Your one time hepatitis C screening is negative. A1c is normal indicating no prediabetes or diabetes. Your thyroid function is normal.    No blood cell abnormalities including anemia. Cholesterol is very elevated and I would like to increase atorvastatin to 20 mg daily and recheck lipids in 3 months.

## 2021-12-09 RX ORDER — ESCITALOPRAM OXALATE 20 MG/1
20 TABLET ORAL DAILY
Qty: 90 TABLET | Refills: 3 | Status: SHIPPED | OUTPATIENT
Start: 2021-12-09 | End: 2021-12-15 | Stop reason: SDUPTHER

## 2021-12-09 RX ORDER — ATORVASTATIN CALCIUM 20 MG/1
TABLET, FILM COATED ORAL
Qty: 90 TABLET | Refills: 0 | Status: SHIPPED | OUTPATIENT
Start: 2021-12-09 | End: 2021-12-17 | Stop reason: SINTOL

## 2021-12-09 RX ORDER — LEVOTHYROXINE SODIUM 112 UG/1
112 TABLET ORAL
Qty: 90 TABLET | Refills: 3 | Status: SHIPPED | OUTPATIENT
Start: 2021-12-09 | End: 2021-12-15 | Stop reason: SDUPTHER

## 2021-12-17 DIAGNOSIS — E78.2 MIXED HYPERLIPIDEMIA WITH APOLIPOPROTEIN E2 VARIANT: Primary | ICD-10-CM

## 2021-12-21 DIAGNOSIS — F41.9 ANXIETY: ICD-10-CM

## 2021-12-21 RX ORDER — LEVOTHYROXINE SODIUM 112 UG/1
112 TABLET ORAL
Qty: 90 TABLET | Refills: 3 | Status: SHIPPED | OUTPATIENT
Start: 2021-12-21 | End: 2022-02-10 | Stop reason: SDUPTHER

## 2021-12-21 RX ORDER — ESCITALOPRAM OXALATE 20 MG/1
20 TABLET ORAL DAILY
Qty: 90 TABLET | Refills: 3 | Status: SHIPPED | OUTPATIENT
Start: 2021-12-21 | End: 2022-02-10 | Stop reason: SDUPTHER

## 2022-01-20 ENCOUNTER — PATIENT MESSAGE (OUTPATIENT)
Dept: FAMILY MEDICINE CLINIC | Age: 72
End: 2022-01-20

## 2022-01-20 DIAGNOSIS — R73.01 IMPAIRED FASTING GLUCOSE: Primary | ICD-10-CM

## 2022-01-20 NOTE — TELEPHONE ENCOUNTER
From: Percy Cisneros  To: Martell Meier NP  Sent: 1/20/2022 2:28 PM EST  Subject: Meds    I stopped taking the metformin after the last labs. You said I didnt need it. But I feel so hungry all the time. Are there any appetite suppressant I could use. I really feel off.

## 2022-01-24 RX ORDER — METFORMIN HYDROCHLORIDE 500 MG/1
500 TABLET ORAL
Qty: 90 TABLET | Refills: 0 | Status: SHIPPED | OUTPATIENT
Start: 2022-01-24 | End: 2022-02-10 | Stop reason: SDUPTHER

## 2022-02-14 ENCOUNTER — OFFICE VISIT (OUTPATIENT)
Dept: CARDIOLOGY CLINIC | Age: 72
End: 2022-02-14
Payer: MEDICARE

## 2022-02-14 VITALS
BODY MASS INDEX: 32.52 KG/M2 | HEIGHT: 64 IN | SYSTOLIC BLOOD PRESSURE: 144 MMHG | HEART RATE: 84 BPM | WEIGHT: 190.5 LBS | OXYGEN SATURATION: 96 % | DIASTOLIC BLOOD PRESSURE: 86 MMHG

## 2022-02-14 DIAGNOSIS — I10 ESSENTIAL HYPERTENSION: Primary | ICD-10-CM

## 2022-02-14 DIAGNOSIS — I49.3 PVC (PREMATURE VENTRICULAR CONTRACTION): ICD-10-CM

## 2022-02-14 DIAGNOSIS — E78.2 MIXED HYPERLIPIDEMIA WITH APOLIPOPROTEIN E2 VARIANT: ICD-10-CM

## 2022-02-14 PROCEDURE — 99214 OFFICE O/P EST MOD 30 MIN: CPT | Performed by: INTERNAL MEDICINE

## 2022-02-14 PROCEDURE — G0463 HOSPITAL OUTPT CLINIC VISIT: HCPCS | Performed by: INTERNAL MEDICINE

## 2022-02-14 PROCEDURE — G8432 DEP SCR NOT DOC, RNG: HCPCS | Performed by: INTERNAL MEDICINE

## 2022-02-14 PROCEDURE — G8536 NO DOC ELDER MAL SCRN: HCPCS | Performed by: INTERNAL MEDICINE

## 2022-02-14 PROCEDURE — 3017F COLORECTAL CA SCREEN DOC REV: CPT | Performed by: INTERNAL MEDICINE

## 2022-02-14 PROCEDURE — G8753 SYS BP > OR = 140: HCPCS | Performed by: INTERNAL MEDICINE

## 2022-02-14 PROCEDURE — 1090F PRES/ABSN URINE INCON ASSESS: CPT | Performed by: INTERNAL MEDICINE

## 2022-02-14 PROCEDURE — G8417 CALC BMI ABV UP PARAM F/U: HCPCS | Performed by: INTERNAL MEDICINE

## 2022-02-14 PROCEDURE — 1101F PT FALLS ASSESS-DOCD LE1/YR: CPT | Performed by: INTERNAL MEDICINE

## 2022-02-14 PROCEDURE — G8427 DOCREV CUR MEDS BY ELIG CLIN: HCPCS | Performed by: INTERNAL MEDICINE

## 2022-02-14 PROCEDURE — G8754 DIAS BP LESS 90: HCPCS | Performed by: INTERNAL MEDICINE

## 2022-02-14 RX ORDER — EVOLOCUMAB 140 MG/ML
140 INJECTION, SOLUTION SUBCUTANEOUS
Qty: 2 PEN | Refills: 3 | Status: SHIPPED | OUTPATIENT
Start: 2022-02-14 | End: 2022-03-02

## 2022-02-14 NOTE — PROGRESS NOTES
Chief Complaint   Patient presents with    Follow-up    Hypertension    Cholesterol Problem       Visit Vitals  BP (!) 144/86 (BP 1 Location: Right upper arm, BP Patient Position: Sitting)   Pulse 84   Ht 5' 4\" (1.626 m)   Wt 190 lb 8 oz (86.4 kg)   SpO2 96%   BMI 32.70 kg/m²       Chest pain denied  SOB - with activity  Dizziness denied  Swelling/Edema - knees down to LE/BL - Increased swelling over the last couple of months  Recent hospital visit denied  Refills denied

## 2022-02-14 NOTE — LETTER
2/14/2022    Patient: Amada Garsia   YOB: 1950   Date of Visit: 2/14/2022     Jolanta Dean NP  [address]  [place] [address] [address]  [place]    Dear Jolanta Dean NP,      Thank you for referring Ms. Percy Cisneros to [facility] for evaluation. My notes for this consultation are attached. If you have questions, please do not hesitate to call me. I look forward to following your patient along with you.       Sincerely,    Tonny Lesches, MD

## 2022-02-14 NOTE — PROGRESS NOTES
Val Barnett MD    [address] [facility], [address] [facility]    Office [phone],OTK [phone]      Blue Mendez is a 70 y.o. female is here for f/u visit . Primary care physician:  Pierre Jackson NP      Chief complaint:    As documented in EMR    Assessment:    Hx of PVC/nonsustained VT - was on Mexiletine. It was discontinued because Holter monitor did not show any PVCs/significant abnormal rhythms. E cardio event monitor after stopping mexiletine (2018)-sinus rhythm/1 PVC/no significant arrhythmias    Hx of non ischemic cardiomyopathy -resolved ;normal EF on ECHO 11/2014 ; 10/2016 ; 11/8/17  Hx of orthostasis/dizziness - currently aysmpotmatic  HTN  HLD  Chronic fatigue          Plan:     Saw ANP-Ms. Snyder on 10/21/2021-lower extremity Doppler-negative for DVT  Continue antihypertensives ( on B Blocker). Lipids reviewed 12/7/2021-, triglycerides 347, total cholesterol 214, HDL 36. On Livalo/myalgia with atorvastatin. Will try Repatha. Recheck lipids a few weeks after starting Repatha. Blood pressure elevated - has knee pain  Followed by Dr. Lv Hickman   Scheduled to have left knee replacement. Will check echocardiogram.  If EF is normal -Can proceed with surgery. Aggressive cardiovascular risk factor modification. Advised to lose weight. Follow-up in 6 months or earlier as needed    The 10-year ASCVD risk score (Evelin Roberts., et al., 2013) is: 18.8%    Values used to calculate the score:      Age: 70 years      Sex: Female      Is Non- : No      Diabetic: No      Tobacco smoker: No      Systolic Blood Pressure: 577 mmHg      Is BP treated: Yes      HDL Cholesterol: 36 mg/dL      Total Cholesterol: 214 mg/dL      Add: 2/17/22 - EF 55-60% on ECHO  Can proceed with Knee surgery    Patient understands the plan. All questions were answered to the patient's satisfaction.     Medication Side Effects and Warnings were discussed with patient: yes  Patient Labs were reviewed and or requested:  yes  Patient Past Records were reviewed and or requested: yes    I appreciate the opportunity to be involved in Ms. Jaimee Armstrong. See note below for details. Please do not hesitate to contact us with questions or concerns. Magdalene Ervin MD    Cardiac Testing/ Procedures: A. Cardiac Cath/PCI:    B.ECHO/ISAAC:    3/12/20 -EF 55-60%/Grade 1 DD/Trace MR  11/8/17 - EF 55-60%/Grade 1 DD  10/5/16-EF 08-25%, grade 1 diastolic dysfunction, mild TR  11/21/14Left ventricle: Systolic function was normal. Ejection fraction was  estimated to be 55 %. There were no regional wall motion abnormalities. Doppler parameters were consistent with abnormal left ventricular  relaxation (grade 1 diastolic dysfunction). Left atrium: The atrium was mildly dilated. Mitral valve: There was mild regurgitation. Tricuspid valve: There was mild regurgitation. 10/2013 - EF 55%    7/2007 - EF 45%    C. StressNuclear/Stress ECHO/Stress test: Nm Stress ECHO 9/29/20 - 4.1 min  11/8/17 - Treadmill test - 4.39 min/nml  04/99 - Persantine cardiolite - EF 48%/No inducible ischemia      D. Vascular: 3/12/20 - Nml ERIC bilat    E. EP: Hx on nonsustained VT/PVC- was on mexilitine  12/4/98 - tilt test with marked heart rate variablility and drop in BP without hemodynamic collapse    8/9/16 - Holter - One blocked P wave ( 4.50 AM) ; no PV; SR  2/11/18 to 3/12/18E  cardio event monitor-sinus rhythm/one PVC    F. Miscellaneous:    Subjective:  Jose Miguel Mckeon is a 70 y.o. female who returns for follow up  Visit. Doing well  No papitations/dizziness  No CP/dyspnea/swelling lower extremities. Chronic fatigue which has not changed significantly. Recently cholesterol was checked and is elevated. She has tried atorvastatin but has myalgias. She has also tried livalo but continues to have elevated cholesterol.   She also has difficulty affording the medication because it is very expensive. Does have swelling lower extremities. Recently had ultrasound lower extremities - neg for DVT    ROS:  (bold if positive, if negative)             Medications before admission:    Current Outpatient Medications   Medication Sig Dispense    losartan (COZAAR) 50 mg tablet TAKE 1 TABLET DAILY 90 Tablet    escitalopram oxalate (LEXAPRO) 20 mg tablet Take 1 Tablet by mouth daily. 90 Tablet    levothyroxine (SYNTHROID) 112 mcg tablet Take 1 Tablet by mouth Daily (before breakfast). 90 Tablet    fenofibrate nanocrystallized (TRICOR) 145 mg tablet Take 1 Tablet by mouth daily. 90 Tablet    pitavastatin calcium (LIVALO) 4 mg tab tablet Take 0.5 Tablets by mouth daily. 90 Tablet    cholecalciferol (VITAMIN D3) (2,000 UNITS /50 MCG) cap capsule Take  by mouth daily.  busPIRone (BUSPAR) 10 mg tablet Take 10 mg by mouth daily as needed.  metoprolol succinate (TOPROL-XL) 25 mg XL tablet Take 1 Tab by mouth daily. 90 Tab    traZODone (DESYREL) 50 mg tablet Take 50 mg by mouth nightly as needed.  clobetasoL (TEMOVATE) 0.05 % topical cream      aspirin delayed-release 81 mg tablet Take 81 mg by mouth nightly.  CYANOCOBALAMIN (VITAMIN B-12 IJ) by Injection route every fourteen (14) days. SQ     metFORMIN (GLUCOPHAGE) 500 mg tablet Take 1 Tablet by mouth daily (with dinner). (Patient not taking: Reported on 2/14/2022) 90 Tablet     No current facility-administered medications for this visit. Physical Exam:  Visit Vitals  BP (!) 144/86 (BP 1 Location: Right upper arm, BP Patient Position: Sitting)   Pulse 84   Ht 5' 4\" (1.626 m)   Wt 190 lb 8 oz (86.4 kg)   SpO2 96%   BMI 32.70 kg/m²          Gen: Well-developed, well-nourished, in no acute distress  Neck: Supple,No JVD, No Carotid Bruit,   Resp: No accessory muscle use, Clear breath sounds, No rales or rhonchi  Card: Regular Rate,Rythm,Normal S1, S2, No murmurs, rubs or gallop. No thrills.    Abd:  Soft,BS+,   MSK: No cyanosis  Skin: No rashes    Neuro: moving all four extremities , follows commands appropriately  Psych:  Good insight, oriented to person, place , alert, Nml Affect  LE: No edema    EKG -       LABS:        Lab Results   Component Value Date/Time    WBC 8.8 12/07/2021 03:09 PM    HGB 11.3 12/07/2021 03:09 PM    HCT 35.5 12/07/2021 03:09 PM    PLATELET 195 52/02/6106 03:09 PM    MCV 86 12/07/2021 03:09 PM     Lab Results   Component Value Date/Time    Sodium 141 12/07/2021 03:09 PM    Potassium 4.3 12/07/2021 03:09 PM    Chloride 104 12/07/2021 03:09 PM    CO2 20 12/07/2021 03:09 PM    Anion gap 7 04/10/2018 04:36 AM    Glucose 120 (H) 12/07/2021 03:09 PM    Glucose 97 03/04/2021 10:32 AM    BUN 14 12/07/2021 03:09 PM    Creatinine 0.64 12/07/2021 03:09 PM    BUN/Creatinine ratio 22 12/07/2021 03:09 PM    GFR est  12/07/2021 03:09 PM    GFR est non-AA 90 12/07/2021 03:09 PM    Calcium 9.5 12/07/2021 03:09 PM       Lab Results   Component Value Date/Time    aPTT 25.2 09/14/2015 12:06 PM     Lab Results   Component Value Date/Time    INR 1.1 03/26/2018 11:47 AM    INR 1.0 09/14/2015 12:06 PM    Prothrombin time 11.2 (H) 03/26/2018 11:47 AM    Prothrombin time 10.4 09/14/2015 12:06 PM     No components found for: Trish Mcgowan MD

## 2022-02-17 ENCOUNTER — ANCILLARY PROCEDURE (OUTPATIENT)
Dept: CARDIOLOGY CLINIC | Age: 72
End: 2022-02-17
Payer: MEDICARE

## 2022-02-17 VITALS
SYSTOLIC BLOOD PRESSURE: 130 MMHG | WEIGHT: 190 LBS | BODY MASS INDEX: 32.44 KG/M2 | DIASTOLIC BLOOD PRESSURE: 72 MMHG | HEIGHT: 64 IN

## 2022-02-17 PROCEDURE — 93308 TTE F-UP OR LMTD: CPT | Performed by: INTERNAL MEDICINE

## 2022-02-21 PROCEDURE — 93308 TTE F-UP OR LMTD: CPT | Performed by: INTERNAL MEDICINE

## 2022-02-22 ENCOUNTER — TELEPHONE (OUTPATIENT)
Dept: CARDIOLOGY CLINIC | Age: 72
End: 2022-02-22

## 2022-02-22 NOTE — TELEPHONE ENCOUNTER
From patient advise request:    I saw you on February 14 and since my heart rate had been much higher than normal. It makes me feel funny. It has gotten up to 110 it is usually around 75. Should I be concerned.  Thank you

## 2022-03-02 RX ORDER — ALIROCUMAB 150 MG/ML
150 INJECTION, SOLUTION SUBCUTANEOUS EVERY 2 WEEKS
Qty: 6 PEN | Refills: 3 | Status: SHIPPED | OUTPATIENT
Start: 2022-03-02 | End: 2022-03-05 | Stop reason: SDUPTHER

## 2022-03-02 NOTE — TELEPHONE ENCOUNTER
Prescription changed from [address] to [facility]. Repatha not on formulary list.  Patient notified. Understanding expressed.

## 2022-03-09 ENCOUNTER — TRANSCRIBE ORDER (OUTPATIENT)
Dept: REGISTRATION | Age: 72
End: 2022-03-09

## 2022-03-09 ENCOUNTER — HOSPITAL ENCOUNTER (OUTPATIENT)
Dept: PREADMISSION TESTING | Age: 72
Discharge: HOME | End: 2022-03-09
Attending: ORTHOPAEDIC SURGERY

## 2022-03-09 ENCOUNTER — HOSPITAL ENCOUNTER (OUTPATIENT)
Dept: PREADMISSION TESTING | Age: 72
Discharge: HOME | End: 2022-03-09
Payer: MEDICARE

## 2022-03-09 VITALS
TEMPERATURE: 98.4 F | DIASTOLIC BLOOD PRESSURE: 66 MMHG | HEART RATE: 72 BPM | BODY MASS INDEX: 31.77 KG/M2 | HEIGHT: 64 IN | WEIGHT: 186.07 LBS | SYSTOLIC BLOOD PRESSURE: 109 MMHG

## 2022-03-09 DIAGNOSIS — U07.1 COVID-19: Primary | ICD-10-CM

## 2022-03-09 DIAGNOSIS — U07.1 COVID-19: ICD-10-CM

## 2022-03-09 LAB
ABO + RH BLD: NORMAL
ANION GAP SERPL CALC-SCNC: 5 MMOL/L (ref 5–15)
APPEARANCE UR: ABNORMAL
BACTERIA URNS QL MICRO: ABNORMAL /HPF
BILIRUB UR QL: NEGATIVE
BLOOD GROUP ANTIBODIES SERPL: NORMAL
BUN SERPL-MCNC: 16 MG/DL (ref 6–20)
BUN/CREAT SERPL: 24 (ref 12–20)
CALCIUM SERPL-MCNC: 9.4 MG/DL (ref 8.5–10.1)
CHLORIDE SERPL-SCNC: 108 MMOL/L (ref 97–108)
CO2 SERPL-SCNC: 26 MMOL/L (ref 21–32)
COLOR UR: ABNORMAL
CREAT SERPL-MCNC: 0.68 MG/DL (ref 0.55–1.02)
EPITH CASTS URNS QL MICRO: ABNORMAL /LPF
ERYTHROCYTE [DISTWIDTH] IN BLOOD BY AUTOMATED COUNT: 14.1 % (ref 11.5–14.5)
EST. AVERAGE GLUCOSE BLD GHB EST-MCNC: 120 MG/DL
GLUCOSE SERPL-MCNC: 109 MG/DL (ref 65–100)
GLUCOSE UR STRIP.AUTO-MCNC: NEGATIVE MG/DL
HBA1C MFR BLD: 5.8 % (ref 4–5.6)
HCT VFR BLD AUTO: 36.7 % (ref 35–47)
HGB BLD-MCNC: 11.5 G/DL (ref 11.5–16)
HGB UR QL STRIP: ABNORMAL
HYALINE CASTS URNS QL MICRO: ABNORMAL /LPF (ref 0–5)
INR PPP: 1 (ref 0.9–1.1)
KETONES UR QL STRIP.AUTO: NEGATIVE MG/DL
LEUKOCYTE ESTERASE UR QL STRIP.AUTO: ABNORMAL
MCH RBC QN AUTO: 28.1 PG (ref 26–34)
MCHC RBC AUTO-ENTMCNC: 31.3 G/DL (ref 30–36.5)
MCV RBC AUTO: 89.7 FL (ref 80–99)
NITRITE UR QL STRIP.AUTO: NEGATIVE
NRBC # BLD: 0 K/UL (ref 0–0.01)
NRBC BLD-RTO: 0 PER 100 WBC
PH UR STRIP: 5.5 [PH] (ref 5–8)
PLATELET # BLD AUTO: 293 K/UL (ref 150–400)
PMV BLD AUTO: 10.1 FL (ref 8.9–12.9)
POTASSIUM SERPL-SCNC: 4 MMOL/L (ref 3.5–5.1)
PROT UR STRIP-MCNC: NEGATIVE MG/DL
PROTHROMBIN TIME: 10.6 SEC (ref 9–11.1)
RBC # BLD AUTO: 4.09 M/UL (ref 3.8–5.2)
RBC #/AREA URNS HPF: ABNORMAL /HPF (ref 0–5)
SARS-COV-2, XPLCVT: NOT DETECTED
SODIUM SERPL-SCNC: 139 MMOL/L (ref 136–145)
SOURCE, COVRS: NORMAL
SP GR UR REFRACTOMETRY: 1.01 (ref 1–1.03)
SPECIMEN EXP DATE BLD: NORMAL
UA: UC IF INDICATED,UAUC: ABNORMAL
UROBILINOGEN UR QL STRIP.AUTO: 1 EU/DL (ref 0.2–1)
WBC # BLD AUTO: 8 K/UL (ref 3.6–11)
WBC URNS QL MICRO: ABNORMAL /HPF (ref 0–4)

## 2022-03-09 PROCEDURE — U0005 INFEC AGEN DETEC AMPLI PROBE: HCPCS

## 2022-03-09 PROCEDURE — 83036 HEMOGLOBIN GLYCOSYLATED A1C: CPT

## 2022-03-09 PROCEDURE — 87186 SC STD MICRODIL/AGAR DIL: CPT

## 2022-03-09 PROCEDURE — 86900 BLOOD TYPING SEROLOGIC ABO: CPT

## 2022-03-09 PROCEDURE — 87086 URINE CULTURE/COLONY COUNT: CPT

## 2022-03-09 PROCEDURE — 81001 URINALYSIS AUTO W/SCOPE: CPT

## 2022-03-09 PROCEDURE — 85027 COMPLETE CBC AUTOMATED: CPT

## 2022-03-09 PROCEDURE — 85610 PROTHROMBIN TIME: CPT

## 2022-03-09 PROCEDURE — 87077 CULTURE AEROBIC IDENTIFY: CPT

## 2022-03-09 PROCEDURE — 80048 BASIC METABOLIC PNL TOTAL CA: CPT

## 2022-03-09 RX ORDER — CYCLOBENZAPRINE HCL 5 MG
5 TABLET ORAL AS NEEDED
COMMUNITY
End: 2022-04-26

## 2022-03-09 RX ORDER — BISMUTH SUBSALICYLATE 262 MG
1 TABLET,CHEWABLE ORAL DAILY
COMMUNITY

## 2022-03-09 NOTE — PROGRESS NOTES
Patient found to have bacteria and leukocyte esterase in her urine on PAT. She denies any dysuria, foul-smelling urine, fever/chills. She reports that she is on a long term antibiotic for recurrent urinary tract infections.   Will send in Macrobid x5 days for presumed UTI

## 2022-03-09 NOTE — PERIOP NOTES
[address] [facility] INSTRUCTIONS  ORTHOPAEDIC    Surgery Date:  3/14/22    [facility] staff will call you between 4 PM- 8 PM the day before surgery with your arrival time. If your surgery is on a Monday, we will call you the preceding Friday. Please call [phone] after 8 PM if you did not receive your arrival time. 1. Please report to [facility] Patient Access/Admitting on the 1st floor. Bring your insurance card, photo identification, and any copayment (if applicable). 2. If you are going home the same day of your surgery, you must have a responsible adult to drive you home. You need to have a responsible adult to stay with you the first 24 hours after surgery and you should not drive a car for 24 hours following your surgery. 3. Do NOT eat any solid foods after midnight the night before surgery including candy, mints or gum. You may drink clear liquids from midnight until 1 hour prior to arrival time. You may drink up to 12 ounces at one time every 4 hours. 4. Do NOT drink alcohol or smoke 24 hours before surgery. STOP smoking for 14 days prior as it helps with breathing and healing after surgery. 5. If your arrival time is 3pm or later, you may eat a light breakfast before 8am (toast, bagel-no butter, black coffee, plain tea, fruit juice-no pulp) Please note special instructions, if applicable, below for medications. 6. If you are being admitted to the hospital,please leave personal belongings/luggage in your car until you have an assigned hospital room number. 7. Please wear comfortable clothes. Wear your glasses instead of contacts. We ask that all money, jewelry and valuables be left at home. Wear no make up, particularly mascara, the day of surgery. 8.  All body piercings, rings, and jewelry need to be removed and left at home. Please remove any nail polish or artificial nails from your fingernails. Please wear your hair loose or down.  Please no pony-tails, buns, or any metal hair accessories. If you shower the morning of surgery, please do not apply any lotions or powders afterwards. You may wear deodorant. Do not shave any body area within 24 hours of your surgery. 9. Please follow all instructions to avoid any potential surgical cancellation. 10. Should your physical condition change, (i.e. fever, cold, flu, etc.) please notify your surgeon as soon as possible. 11. It is important to be on time. If a situation occurs where you may be delayed, please call:  [phone] / [phone] on the day of surgery. 12. The Preadmission Testing staff can be reached at [phone]. 13. Special instructions: CPAP    Current Outpatient Medications   Medication Sig    cyclobenzaprine (FLEXERIL) 5 mg tablet Take 5 mg by mouth as needed for Muscle Spasm(s).  multivitamin (ONE A DAY) tablet Take 1 Tablet by mouth daily.  escitalopram oxalate (LEXAPRO) 20 mg tablet Take 1 Tablet by mouth daily. (Patient taking differently: Take 20 mg by mouth nightly.)    levothyroxine (SYNTHROID) 112 mcg tablet Take 1 Tablet by mouth Daily (before breakfast).  losartan (COZAAR) 50 mg tablet TAKE 1 TABLET DAILY (Patient taking differently: every evening. TAKE 1 TABLET DAILY)    alirocumab (Praluent Pen) 150 mg/mL injector pen 1 mL by SubCUTAneous route Once every 2 weeks.  fenofibrate nanocrystallized (TRICOR) 145 mg tablet Take 1 Tablet by mouth daily. (Patient taking differently: Take 145 mg by mouth nightly.)    cholecalciferol (VITAMIN D3) (2,000 UNITS /50 MCG) cap capsule Take  by mouth daily.  busPIRone (BUSPAR) 10 mg tablet Take 10 mg by mouth daily as needed.  metoprolol succinate (TOPROL-XL) 25 mg XL tablet Take 1 Tab by mouth daily.  traZODone (DESYREL) 50 mg tablet Take 50 mg by mouth nightly as needed.  clobetasoL (TEMOVATE) 0.05 % topical cream     aspirin delayed-release 81 mg tablet Take 81 mg by mouth nightly.     CYANOCOBALAMIN (VITAMIN B-12 IJ) by Injection route every fourteen (14) days. SQ     No current facility-administered medications for this encounter. 1. YOU MUST ONLY TAKE THESE MEDICATIONS THE MORNING OF SURGERY WITH A SIP OF WATER:METOPROLOL, LEVOTHYROXINE  2. MEDICATIONS TO TAKE THE MORNING OF SURGERY ONLY IF NEEDED: LEXAPRO  3. HOLD these prescription medications BEFORE Surgery: NONE  4. Ask your surgeon/prescribing physician about when/if to STOP taking these medications: NONE  5. Stop any non-steroidal anti-inflammatory drugs (i.e. Ibuprofen, Naproxen, Advil, Aleve) 3 days before surgery. You may take Tylenol. STOP all vitamins and herbal supplements 1 week prior to  Surgery. 6. STOP MULTIVITAMIN ON 3/9/22.  7. If you are currently taking Plavix, Coumadin, or any other blood-thinning/anticoagulant medication contact your prescribing physician for instructions. Preventing Infections Before and After - Your Surgery    IMPORTANT INSTRUCTIONS    You play an important role in your health and preparation for surgery. To reduce the germs on your skin you will need to shower with CHG soap (Chorhexidine gluconate 4%) two times before surgery. CHG soap (Hibiclens, Hex-A-Clens or store brand)   CHG soap will be provided at your Preadmission Testing (PAT) appointment.  If you do not have a PAT appointment before surgery, you may arrange to  CHG soap from our office or purchase CHG soap at a pharmacy, grocery or department store.  You need to purchase TWO 4 ounce bottles to use for your 2 showers. Steps to follow:  1. Wash your hair with your normal shampoo and your body with regular soap and rinse well to remove shampoo and soap from your skin. 2. Wet a clean washcloth and turn off the shower. 3. Put CHG soap on washcloth and apply to your entire body from the neck down. Do not use on your head, face or private parts(genitals). Do not use CHG soap on open sores, wounds or areas of skin irritation. 4. Wash you body gently for 5 minutes.  Do not wash your skin too hard. This soap does not create lather. Pay special attention to your underarms and from your belly button to your feet. 5. Turn the shower back on and rinse well to get CHG soap off your body. 6. Pat your skin dry with a clean, dry towel. Do not apply lotions or moisturizer. 7. Put on clean clothes and sleep on fresh bed sheets and do not allow pets to sleep with you. Shower with CHG soap 2 times before your surgery   The evening before your surgery   The morning of your surgery      Tips to help prevent infections after your surgery:  1. Protect your surgical wound from germs:  ? Hand washing is the most important thing you and your caregivers can do to prevent infections. ? Keep your bandage clean and dry! ? Do not touch your surgical wound. 2. Use clean, freshly washed towels and washcloths every time you shower; do not share bath linens with others. 3. Until your surgical wound is healed, wear clothing and sleep on bed linens each day that are clean and freshly washed. 4. Do not allow pets to sleep in your bed with you or touch your surgical wound. 5. Do not smoke - smoking delays wound healing. This may be a good time to stop smoking. 6. If you have diabetes, it is important for you to manage your blood sugar levels properly before your surgery as well as after your surgery. Poorly managed blood sugar levels slow down wound healing and prevent you from healing completely. Prevention of Infection  Testing for Staphylococcus aureus on your skin before surgery    Staphylococcus aureus (staph) is a common bacteria that is found on the body. It normally does not cause infection on healthy skin. Before surgery, you will be tested to see if you have staph by swabbing the inside of your nose. When you have an incision with surgery, the goal is to protect that incision from infection.  Removal of the staph bacteria before surgery can decrease the risk of a surgical site infection. If your nose swab is positive for staph you will be called. Your treatment will include 2 steps:   Prescription for Mupirocin ointment to be used in each nostril twice a day for 5 days.  Showering with Chlorhexidine (CHG) liquid soap for 5 days prior to surgery. How to use Mupirocin ointment in your nose  1.  the prescription from your pharmacy. You will receive a large tube of ointment which will be big enough for all of your treatments. You will apply this ointment to each nostril 2 times a day for 5 days. 2. Wash your hands with  gel or soap and water for 20 seconds before using ointment. 3. Place a pea-sized amount of ointment on a cotton Q-tip. 4. Apply ointment just inside of each nostril with the Q-tip. Do not push Q-tip or ointment deep inside you nose. 5. Press your nostrils together and massage for a few seconds. 6. Wash your hands with  gel or soap and water after you are finished. 7. Do not get ointment near your eyes. If it gets into your eyes, rinse them with cool water. 8. If you need to use nasal spray, clean the tip of the bottle with alcohol before use and do not use both at the same time. 9. If you are scheduled for COVID testing during the 5 days, do NOT apply morning dose until after the COVID test has been performed. How to use Chlorhexidine (CHG) 4% liquid soap  1. Purchase an 8 ounce bottle of CHG liquid soap (Chlorhexidine 4%, Hibiclens, Hex-A-Clens or store brand) at a pharmacy or grocery store. 2. Wash your hair with your normal shampoo and your body with regular soap and rinse well to remove shampoo and soap from your skin. 3. Wet a clean washcloth and turn off the shower. 4. Put CHG soap on washcloth and apply to your entire body from the neck down. Do not use on your head, face or private parts(genitals). Do not use CHG soap on open sores, wounds or areas of skin irritation. 5. Wash your body gently for 5 minutes.  Do not wash your skin too hard. This soap does not create lather. Pay special attention to your underarms and from your belly button to your feet. 6. Turn the shower back on and rinse well to get CHG soap off your body. 7. Pat your skin dry with a clean, dry towel. Do not apply lotions or moisturizer. 8. Put on clean clothes and sleep on fresh bed sheets the night before surgery. Do not allow pets to sleep with you. Eating and Drinking Before Surgery     You may eat a regular dinner at the usual time on the day before your surgery.  Do NOT eat any solid foods after midnight unless your arrival time at the hospital is 3pm or later.  You may drink clear liquids only from 12 midnight until 1 hours prior to your arrival time at the hospital on the day of your surgery. Do NOT drink alcohol.  Clear liquids include:  o Water  o Fruit juices without pulp( i.e. apple juice)  o Carbonated beverages  o Black coffee (no cream/milk)  o Tea (no cream/milk)  o Gatorade   You may drink up to 12-16 ounces at one time every 4 hours between the hours of midnight and 1 hour before your arrival time at the hospital. Example- if your arrival time at the hospital is 6am, you may drink 12-16 ounces of clear liquids no later than 5am.   If your arrival time at the hospital is 3pm or later, you may eat a light breakfast before 8am.   A light breakfast includes:  o Toast or bagel (no butter)  o Black coffee (no cream/milk)  o Tea (no cream/milk)  o Fruit juices without pulp ( i.e. apple juice)  o Do NOT eat meat, eggs, vegetables or fruit   If you have any questions, please contact your surgeon's office. [facility]   Instructions for Pre-Surgery COVID-19 Testing     Across our ministry we have established standard guidelines to ensure the health and safety of our patients, residents and associates as we resume elective services for patients.  All patients presenting for surgery are required to have a COVID-19 test result within 96 hours of their scheduled surgery. [facility] is providing this test free of charge to the patient. Instructions for COVID-19 Testing:     Patients will receive a call from Pre-Admission Testing 4-5 days prior to surgery to schedule a date and time to come to the [address] [place] [address] Drive for their COVID-19 test   Patients are advised to self-quarantine after testing until their scheduled surgery   Once on site, patients will be registered and receive COVID test in their vehicle   If a patient is scheduled for normal Pre Admission Testing 96 hours from date of surgery, the patient will still have their COVID test done at the [address] [place] [address] located at [address] [place] [address] Positive results will be shared with the surgeon and anesthesiologist and may result in cancellation of the elective procedure    Testing Hours and Location:   Address:  [address] [place] [address] Up Pre Admission [address] [place] [address] in the Discharge Lot on Critical access hospital (Map Attached)  [place] [address], [address]   Hours: Monday- Friday 7a-3p    PAT Phone Number: [phone]            Patient Information Regarding COVID Restrictions    Patients are advised to self-quarantine after COVID testing up to the day of the scheduled procedure. Day of Procedure     Please park in the parking deck or any designated visitor parking lot.  Enter the facility through the Main Entrance of the hospital.   A temperature check and appropriate symptom/exposure screening will be done prior to entry to the facility.  On the day of surgery, please provide the cell phone number of the person who will be waiting for you to the Patient Access representative at the time of registration.  Please wear a mask on the day of your procedure.  We are now allowing one designated visitor per stay. Pediatric patients may have 2 designated visitors.  This one person may come in with you on the day of your procedure.  No visitors under the age of 13.  The designated visitor must also wear a mask.  Once your procedure and the immediate recovery period is completed, a nurse in the recovery area will contact your designated visitor to inform them of your room number or to otherwise review other pertinent information regarding your care.  Social distancing practices are to be adhered to in waiting areas and the cafeteria. The patient was contacted in person. She verbalized understanding of all instructions does not  need reinforcement.

## 2022-03-09 NOTE — H&P
Chief Complaint: Pain of the Left Knee     HPI: Pam Joy is a 70 y.o. female returns for follow-up of atraumatic intermittent left knee pain for the last 3 months that has worsened recently after visiting [facility]. The pain has decreased her functional ability and wakes her up at night.     During her last visit with our office on 12/22/2021, the patient reported decreased ability to sleep due to the pain. Patient noted that she has bilateral lower extremity neuropathy resulting in numbness and tingling that has been worked up by a physician without figuring out the cause. Physical exam revealed positive patellofemoral grind as well as tenderness over the popliteal fossa and the distal quadriceps. We were unable to palpate a pedal pulse but toes are warm and capillary refill is less than 2nd. X-rays of the left knee taken on 12/22/2021 showed varus deformity with near bone-on-bone osteoarthritis of the medial compartment and severe degenerative changes of the patellofemoral joint with lateral riding patella. We proceeded with a steroid injection to the left knee and discussed that she would be candidate for total knee replacement. Returns today for reassessment.     Objective:      Vitals:      Height: 5' 4\"       Wt Readings from Last 3 Encounters:   02/11/22 189 lb   12/22/21 189 lb   11/23/20 184 lb      Body mass index is 32.44 kg/m².     Constitutional:  No acute distress. Well nourished. Well developed. Eyes:  Sclera are nonicteric. Respiratory:  No labored breathing. Cardiovascular:  No marked edema. Skin:  No marked skin ulcers. Neurological:  No marked sensory loss noted. Psychiatric: Alert and oriented x3.     Musculoskeletal :    Left Knee:  Full range of motion of the left knee with mild pain on motion.   Unable to palpate pedal pulses but toes warm and with cap refill <1 sec.  No pedal edema.     Radiographs:             No imaging obtained    Assessment:      1. Primary osteoarthritis of left knee       Plan:   XR of the left knee taken on 12/22/21 shows a varus deformity with near bone-on-bone osteoarthritis of the medial compartment and severe degenerative changes of the patellofemoral joint with lateral riding patella. I discussed the continued diagnosis of left knee osteoarthritis. We discussed treatment options including continued conservative management vs left total knee replacement surgery as well as the pros and cons of each. She reports she has failed conservative management and would like to discuss surgery further. We discussed left total knee replacement surgery at length including expected outcomes and post-op recovery as well as risks and complications. After a lengthy discussion, the patient desires to proceed with surgery. We will schedule surgery at the patient's convenience. Follow-up for scheduled left total knee replacement.     DATE OF SURGERY UPDATE:  Past Medical History:   Diagnosis Date    Anxiety     Autonomic dysfunction 12/4/98    tilt test with marked heart rate variablility and drop in BP without hemodynamic collapse    Bladder stone     Cancer ([address] 1997    left breast lumpectomy , Rx RT    Diabetes ([address]     pre-diabetic, NOT ON MEDICATION     Dilated cardiomyopathy ([address] 2/8/06    nonischemic    Elevated triglycerides with high cholesterol 11/21/2014    Essential hypertension 2/9/2017    Hx MRSA infection 3/26/2018    [facility] Hypothyroidism 1994    Dr. Geeta Alcazar.    Insulin resistance 11/21/2014    Mixed hyperlipidemia with apolipoprotein E2 variant 11/21/2014    Osteoarthritis     diffuse.  Dr. Hampton Askew Other and unspecified hyperlipidemia 12/21/2010    PONV (postoperative nausea and vomiting)     PVC's 1994    Sleep apnea     ON CPAP    Vitamin D deficiency 11/21/2014     Past Surgical History:   Procedure Laterality Date    HX APPENDECTOMY  1969    HX BILATERAL MASTECTOMY Bilateral 05/2015    HX BREAST AUGMENTATION Bilateral 12/18/2015    REVISION BILATERAL RECONSTRUCTED BREAST, FAT GRAFTING TO CHEST WALL, NIPPLE RECONSTRUCTION performed by Melvi Og MD at [address] HX BREAST AUGMENTATION Bilateral 10/14/2016    REVISION BILATERAL BREAST RECONSTRUCTION / FAT GRAFTING TO CHEST WALL; REVISION OF ABDOMINAL SCAR performed by Melvi Og MD at [address] [place] [address] HX BREAST LUMPECTOMY Left 1997    lumpectomy    HX BREAST RECONSTRUCTION Bilateral 9/28/2015    REVISION OF BILATERAL RECONSTRUCTED BREASTS, FAT GRAFTING TO CHEST WALL,  REVISION OF ABDOMINAL SCAR performed by Melvi Og MD at [address] [place], [place] [address] Drive HX CATARACT REMOVAL Bilateral 2008    with lens implants    HX CHOLECYSTECTOMY  2015    HX COLONOSCOPY  2014    HX COLONOSCOPY  2019    HX COLONOSCOPY  05/13/2021    HX ENDOSCOPY  2014    HX GI      COLONOSCOPY    HX HEART CATHETERIZATION  1994    LVEF 60%. Normal. Dr. Surekha Cormier.     HX HERNIA REPAIR  4/22 16    HX HYSTERECTOMY  1986    left 1 ovary    HX ORTHOPAEDIC Left 1988    foot surgery    HX POLYPECTOMY      HX SALPINGO-OOPHORECTOMY  1988    remaining ovary removed    HX SHOULDER REPLACEMENT Left 10/2020    HX UROLOGICAL      BLADDER LIFT    HX WISDOM TEETH EXTRACTION      GA TOTAL KNEE ARTHROPLASTY Right 2019     Family History   Problem Relation Age of Onset    Kidney Disease Mother     Cancer Mother         ovarian, esophageal cancer    Heart Disease Father     Coronary Art Dis Brother     No Known Problems Daughter     No Known Problems Daughter     No Known Problems Daughter     No Known Problems Daughter     Heart Attack Paternal Uncle         MI IN 40S    Heart Attack Paternal Grandmother         MI IN 40S    Anesth Problems Neg Hx      Social History     Tobacco Use    Smoking status: Never Smoker    Smokeless tobacco: Never Used   Substance Use Topics    Alcohol use: Not Currently     Alcohol/week: 0.0 standard drinks      Prior to Admission Medications Prescriptions Last Dose Informant Patient Reported? Taking? CYANOCOBALAMIN (VITAMIN B-12 IJ)  Self Yes No   Sig: by Injection route every fourteen (14) days. SQ   alirocumab (Praluent Pen) 150 mg/mL injector pen   No No   Si mL by SubCUTAneous route Once every 2 weeks. aspirin delayed-release 81 mg tablet   Yes No   Sig: Take 81 mg by mouth nightly. busPIRone (BUSPAR) 10 mg tablet   Yes No   Sig: Take 10 mg by mouth daily as needed. cholecalciferol (VITAMIN D3) (2,000 UNITS /50 MCG) cap capsule   Yes No   Sig: Take  by mouth daily. clobetasoL (TEMOVATE) 0.05 % topical cream   Yes No   cyclobenzaprine (FLEXERIL) 5 mg tablet   Yes No   Sig: Take 5 mg by mouth as needed for Muscle Spasm(s). escitalopram oxalate (LEXAPRO) 20 mg tablet   No No   Sig: Take 1 Tablet by mouth daily. Patient taking differently: Take 20 mg by mouth nightly. fenofibrate nanocrystallized (TRICOR) 145 mg tablet   No No   Sig: Take 1 Tablet by mouth daily. Patient taking differently: Take 145 mg by mouth nightly. levothyroxine (SYNTHROID) 112 mcg tablet   No No   Sig: Take 1 Tablet by mouth Daily (before breakfast). losartan (COZAAR) 50 mg tablet   No No   Sig: TAKE 1 TABLET DAILY   Patient taking differently: every evening. TAKE 1 TABLET DAILY   metoprolol succinate (TOPROL-XL) 25 mg XL tablet   No No   Sig: Take 1 Tab by mouth daily. multivitamin (ONE A DAY) tablet   Yes No   Sig: Take 1 Tablet by mouth daily. traZODone (DESYREL) 50 mg tablet   Yes No   Sig: Take 50 mg by mouth nightly as needed. Facility-Administered Medications: None     Allergy to: Atorvastatin, Lactose, and Sulfa (sulfonamide antibiotics)    Percy Cisneros was seen and examined.   Physical Examination: General appearance - alert, well appearing, and in no distress  Chest - clear to auscultation, no wheezes, rales or rhonchi, symmetric air entry  Heart - normal rate, regular rhythm, normal S1, S2, no murmurs, rubs, clicks or gallops  Skin - normal coloration and turgor, no rashes, no suspicious skin lesions noted  History and physical has been reviewed. The patient has been examined. There have been no significant clinical changes since the completion of the originally dated History and Physical.  Patient identified by surgeon; surgical site was confirmed by patient and surgeon. Signed By: Susan Coelho PA-C     March 14, 2022 11:20 AM            Orders Placed This Encounter    BMI >=25 PATIENT INSTRUCTIONS & EDUCATION      No follow-ups on file.   Eduardo Sher MD verifies documentation is both accurate and complete and he performed history, physical, and +/- injections mentioned above.

## 2022-03-10 LAB
BACTERIA SPEC CULT: NORMAL
BACTERIA SPEC CULT: NORMAL
SERVICE CMNT-IMP: NORMAL

## 2022-03-10 NOTE — PERIOP NOTES
PAT Nurse Practitioner   Pre-Operative Chart Review/Assessment:-ORTHOPEDIC                Patient Name:  Conchis Mohan                                                           Age:   67 y.o.    :  1950     Today's Date:  3/10/2022     Date of PAT:   3/9/2022      Date of Surgery:    3/14/2022      Procedure(s):  Left Total Knee Arthroplasty     Surgeon:   Dr. Chao Acevedo                       PLAN:      1)  Medical Clearance:  Yang Gann NP      2)  Cardiac Clearance:  Pt followed by Dr. Ian Osman. LOV 22. Clearance obtained on 22. OV note, EKG and ECHO in CC. 3)  Diabetic Treatment Consult:  Not indicated. A1c-5.8      4)  Sleep Apnea evaluation:   +GARCIA dx. Pt uses CPAP. 5) Treatment for MRSA/Staph Aureus:  Neg      6) Additional Concerns:  PONV, HTN, NICM(EF 55%), PreDM, anxiety    UTI tx by KONG Piedra. Vital Signs:         Vitals:    22 0924   BP: 109/66   Pulse: 72   Temp: 98.4 °F (36.9 °C)   Weight: 84.4 kg (186 lb 1.1 oz)   Height: 5' 4\" (1.626 m)            ____________________________________________  PAST MEDICAL HISTORY  Past Medical History:   Diagnosis Date    Anxiety     Autonomic dysfunction 98    tilt test with marked heart rate variablility and drop in BP without hemodynamic collapse    Bladder stone     Cancer ([address]     left breast lumpectomy , Rx RT    Diabetes ([address]     pre-diabetic, NOT ON MEDICATION     Dilated cardiomyopathy ([address] 06    nonischemic    Elevated triglycerides with high cholesterol 2014    Essential hypertension 2017    Hx MRSA infection 3/26/2018    ADAM Mcintyre Hypothyroidism     Dr. Thea Crowley.    Insulin resistance 2014    Mixed hyperlipidemia with apolipoprotein E2 variant 2014    Osteoarthritis     diffuse.  Dr. Tara Vela Other and unspecified hyperlipidemia 2010    PONV (postoperative nausea and vomiting)     PVC's     Sleep apnea     ON CPAP    Vitamin D deficiency 11/21/2014      ____________________________________________  PAST SURGICAL HISTORY  Past Surgical History:   Procedure Laterality Date    HX APPENDECTOMY  1969    HX BILATERAL MASTECTOMY Bilateral 05/2015    HX BREAST AUGMENTATION Bilateral 12/18/2015    REVISION BILATERAL RECONSTRUCTED BREAST, FAT GRAFTING TO CHEST WALL, NIPPLE RECONSTRUCTION performed by Josephine Escudero MD at [address] [place] [address] HX BREAST AUGMENTATION Bilateral 10/14/2016    REVISION BILATERAL BREAST RECONSTRUCTION / FAT GRAFTING TO CHEST WALL; REVISION OF ABDOMINAL SCAR performed by Josephine Escudero MD at [address] [place] [address] HX BREAST LUMPECTOMY Left 1997    lumpectomy    HX BREAST RECONSTRUCTION Bilateral 9/28/2015    REVISION OF BILATERAL RECONSTRUCTED BREASTS, FAT GRAFTING TO CHEST WALL,  REVISION OF ABDOMINAL SCAR performed by Josephine Escudero MD at [address] [place] [address] HX CATARACT REMOVAL Bilateral 2008    with lens implants    HX CHOLECYSTECTOMY  2015    HX COLONOSCOPY  2014    HX COLONOSCOPY  2019    HX COLONOSCOPY  05/13/2021    HX ENDOSCOPY  2014    HX GI      COLONOSCOPY    HX HEART CATHETERIZATION  1994    LVEF 60%. Normal. Dr. Tariq Doe.  HX HERNIA REPAIR  4/22 16    HX HYSTERECTOMY  1986    left 1 ovary    HX ORTHOPAEDIC Left 1988    foot surgery    HX POLYPECTOMY      HX SALPINGO-OOPHORECTOMY  1988    remaining ovary removed    HX SHOULDER REPLACEMENT Left 10/2020    HX UROLOGICAL      BLADDER LIFT    HX WISDOM TEETH EXTRACTION      RI TOTAL KNEE ARTHROPLASTY Right 2019      ____________________________________________  HOME MEDICATIONS  Current Outpatient Medications   Medication Sig    cyclobenzaprine (FLEXERIL) 5 mg tablet Take 5 mg by mouth as needed for Muscle Spasm(s).  multivitamin (ONE A DAY) tablet Take 1 Tablet by mouth daily.  escitalopram oxalate (LEXAPRO) 20 mg tablet Take 1 Tablet by mouth daily.  (Patient taking differently: Take 20 mg by mouth nightly.)    levothyroxine (SYNTHROID) 112 mcg tablet Take 1 Tablet by mouth Daily (before breakfast).  losartan (COZAAR) 50 mg tablet TAKE 1 TABLET DAILY (Patient taking differently: every evening. TAKE 1 TABLET DAILY)    alirocumab (Praluent Pen) 150 mg/mL injector pen 1 mL by SubCUTAneous route Once every 2 weeks.  fenofibrate nanocrystallized (TRICOR) 145 mg tablet Take 1 Tablet by mouth daily. (Patient taking differently: Take 145 mg by mouth nightly.)    cholecalciferol (VITAMIN D3) (2,000 UNITS /50 MCG) cap capsule Take  by mouth daily.  busPIRone (BUSPAR) 10 mg tablet Take 10 mg by mouth daily as needed.  metoprolol succinate (TOPROL-XL) 25 mg XL tablet Take 1 Tab by mouth daily.  traZODone (DESYREL) 50 mg tablet Take 50 mg by mouth nightly as needed.  clobetasoL (TEMOVATE) 0.05 % topical cream     aspirin delayed-release 81 mg tablet Take 81 mg by mouth nightly.  CYANOCOBALAMIN (VITAMIN B-12 IJ) by Injection route every fourteen (14) days.  SQ     No current facility-administered medications for this encounter.      ____________________________________________  ALLERGIES  Allergies   Allergen Reactions    Atorvastatin Myalgia    Lactose Diarrhea and Nausea Only    Sulfa (Sulfonamide Antibiotics) Hives      ____________________________________________  SOCIAL HISTORY  Social History     Tobacco Use    Smoking status: Never Smoker    Smokeless tobacco: Never Used   Substance Use Topics    Alcohol use: Not Currently     Alcohol/week: 0.0 standard drinks      ____________________________________________   Internal Administration   First Dose COVID-19, Pfizer Purple top, DILUTE for use, 12+ yrs, 30mcg/0.3mL dose  03/20/2021   Second Dose COVID-19, Pfizer Purple top, DILUTE for use, 12+ yrs, 30mcg/0.3mL dose  04/10/2021      Last COVID Lab SARS-CoV-2 ( )   Date Value   03/09/2022 Not detected          Labs:     Hospital Outpatient Visit on 03/09/2022   Component Date Value Ref Range Status    Sodium 03/09/2022 139  136 - 145 mmol/L Final    Potassium 03/09/2022 4.0  3.5 - 5.1 mmol/L Final    Chloride 03/09/2022 108  97 - 108 mmol/L Final    CO2 03/09/2022 26  21 - 32 mmol/L Final    Anion gap 03/09/2022 5  5 - 15 mmol/L Final    Glucose 03/09/2022 109* 65 - 100 mg/dL Final    BUN 03/09/2022 16  6 - 20 MG/DL Final    Creatinine 03/09/2022 0.68  0.55 - 1.02 MG/DL Final    BUN/Creatinine ratio 03/09/2022 24* 12 - 20   Final    GFR est AA 03/09/2022 >60  >60 ml/min/1.73m2 Final    GFR est non-AA 03/09/2022 >60  >60 ml/min/1.73m2 Final    Estimated GFR is calculated using the IDMS-traceable Modification of Diet in Renal Disease (MDRD) Study equation, reported for both  Americans (GFRAA) and non- Americans (GFRNA), and normalized to 1.73m2 body surface area. The physician must decide which value applies to the patient.  Calcium 03/09/2022 9.4  8.5 - 10.1 MG/DL Final    WBC 03/09/2022 8.0  3.6 - 11.0 K/uL Final    RBC 03/09/2022 4.09  3.80 - 5.20 M/uL Final    HGB 03/09/2022 11.5  11.5 - 16.0 g/dL Final    HCT 03/09/2022 36.7  35.0 - 47.0 % Final    MCV 03/09/2022 89.7  80.0 - 99.0 FL Final    MCH 03/09/2022 28.1  26.0 - 34.0 PG Final    MCHC 03/09/2022 31.3  30.0 - 36.5 g/dL Final    RDW 03/09/2022 14.1  11.5 - 14.5 % Final    PLATELET 43/08/5871 292  150 - 400 K/uL Final    MPV 03/09/2022 10.1  8.9 - 12.9 FL Final    NRBC 03/09/2022 0.0  0  WBC Final    ABSOLUTE NRBC 03/09/2022 0.00  0.00 - 0.01 K/uL Final    Crossmatch Expiration 03/09/2022 03/17/2022,2359   Final    ABO/Rh(D) 03/09/2022 A POSITIVE   Final    Antibody screen 03/09/2022 NEG   Final    INR 03/09/2022 1.0  0.9 - 1.1   Final    A single therapeutic range for Vit K antagonists may not be optimal for all indications - see June, 2008 issue of Chest, American College of Chest Physicians Evidence-Based Clinical Practice Guidelines, 8th Edition.     Prothrombin time 03/09/2022 10.6  9.0 - 11.1 sec Final    Color 03/09/2022 YELLOW/STRAW    Final    Color Reference Range: Straw, Yellow or Dark Yellow    Appearance 03/09/2022 CLOUDY* CLEAR   Final    Specific gravity 03/09/2022 1.015  1.003 - 1.030   Final    pH (UA) 03/09/2022 5.5  5.0 - 8.0   Final    Protein 03/09/2022 Negative  NEG mg/dL Final    Glucose 03/09/2022 Negative  NEG mg/dL Final    Ketone 03/09/2022 Negative  NEG mg/dL Final    Bilirubin 03/09/2022 Negative  NEG   Final    Blood 03/09/2022 TRACE* NEG   Final    Urobilinogen 03/09/2022 1.0  0.2 - 1.0 EU/dL Final    Nitrites 03/09/2022 Negative  NEG   Final    Leukocyte Esterase 03/09/2022 SMALL* NEG   Final    UA:UC IF INDICATED 03/09/2022 CULTURE NOT INDICATED BY UA RESULT  CNI   Final    WBC 03/09/2022 5-10  0 - 4 /hpf Final    RBC 03/09/2022 0-5  0 - 5 /hpf Final    Epithelial cells 03/09/2022 FEW  FEW /lpf Final    Epithelial cell category consists of squamous cells and /or transitional urothelial cells. Renal tubular cells, if present, are separately identified as such.  Bacteria 03/09/2022 4+* NEG /hpf Final    Hyaline cast 03/09/2022 0-2  0 - 5 /lpf Final    Hemoglobin A1c 03/09/2022 5.8* 4.0 - 5.6 % Final    Comment: NEW METHOD  PLEASE NOTE NEW REFERENCE RANGE  (NOTE)  HbA1C Interpretive Ranges  <5.7              Normal  5.7 - 6.4         Consider Prediabetes  >6.5              Consider Diabetes      Est. average glucose 03/09/2022 120  mg/dL Final    Special Requests: 03/09/2022 NO SPECIAL REQUESTS    Final    Culture result: 03/09/2022 MRSA NOT PRESENT    Final            Specimen source 03/09/2022 Nasopharyngeal    Final    SARS-CoV-2 03/09/2022 Not detected  NOTD   Final    Comment:      The specimen is NEGATIVE for SARS-CoV-2, the novel coronavirus associated with COVID-19. A negative result does not rule out COVID-19.        Cecily SARS-CoV-2 for use on the [employer] 6800/8800 [employer] is a real-time RT-PCR test intended for the qualitative detection of nucleic acids from SARS-CoV-2  in clinician-collected nasal, nasopharyngeal,and oropharyngeal swab specimens from individuals who meet COVID-19 clinical and/or epidemiological criteria. Cecily SARS-CoV-2 is for use only under Emergency Use Authorization (EUA) in laboratories certified under [address] (CLIA), 42 U[place].C. 377E, that meet requirements to perform high or moderate complexity tests. An individual without symptoms of COVID-19 and who is not shedding SARS-CoV-2 virus would expect to have a negative (not detected) result in this assay. Fact sheet for Healthcare Providers: Zerplydate.co.nz  Fact sheet for Patients: http://www.[employer]/                           [address]859/download       Methodology: RT-PCR      Special Requests: 03/09/2022 NO SPECIAL REQUESTS    Final    [place] Count 03/09/2022     Final                    Value:>100,000  COLONIES/mL      Culture result: 03/09/2022 KLEBSIELLA PNEUMONIAE*   Final       02/17/22    ECHO ADULT FOLLOW-UP OR LIMITED 02/21/2022 2/21/2022    Interpretation Summary    Limited, follow-up EF    Left Ventricle: Left ventricle size is normal. Normal wall thickness. Normal wall motion. Normal left ventricular systolic function. EF by 2D Simpsons Biplane is 55%. Signed by: Paul Thacker MD on 2/21/2022 10:50 PM        Skin:     Denies open wounds, cuts, sores, rashes or other areas of concern in PAT assessment.           Rao Rico NP

## 2022-03-11 ENCOUNTER — ANESTHESIA EVENT (OUTPATIENT)
Dept: SURGERY | Age: 72
End: 2022-03-11
Payer: MEDICARE

## 2022-03-12 LAB
BACTERIA SPEC CULT: ABNORMAL
CC UR VC: ABNORMAL
SERVICE CMNT-IMP: ABNORMAL

## 2022-03-14 ENCOUNTER — ANESTHESIA (OUTPATIENT)
Dept: SURGERY | Age: 72
End: 2022-03-14
Payer: MEDICARE

## 2022-03-14 ENCOUNTER — HOSPITAL ENCOUNTER (OUTPATIENT)
Age: 72
Discharge: HOME HEALTH | End: 2022-03-15
Attending: ORTHOPAEDIC SURGERY | Admitting: ORTHOPAEDIC SURGERY
Payer: MEDICARE

## 2022-03-14 DIAGNOSIS — Z96.652 S/P TOTAL KNEE REPLACEMENT, LEFT: Primary | ICD-10-CM

## 2022-03-14 PROBLEM — M17.12 OSTEOARTHRITIS OF LEFT KNEE: Status: ACTIVE | Noted: 2022-03-14

## 2022-03-14 LAB
GLUCOSE BLD STRIP.AUTO-MCNC: 136 MG/DL (ref 65–117)
GLUCOSE BLD STRIP.AUTO-MCNC: 168 MG/DL (ref 65–117)
GLUCOSE BLD STRIP.AUTO-MCNC: 92 MG/DL (ref 65–117)
SERVICE CMNT-IMP: ABNORMAL
SERVICE CMNT-IMP: ABNORMAL
SERVICE CMNT-IMP: NORMAL

## 2022-03-14 PROCEDURE — 77030005513 HC CATH URETH FOL11 MDII -B: Performed by: ORTHOPAEDIC SURGERY

## 2022-03-14 PROCEDURE — 76010000171 HC OR TIME 2 TO 2.5 HR INTENSV-TIER 1: Performed by: ORTHOPAEDIC SURGERY

## 2022-03-14 PROCEDURE — C1776 JOINT DEVICE (IMPLANTABLE): HCPCS | Performed by: ORTHOPAEDIC SURGERY

## 2022-03-14 PROCEDURE — C1713 ANCHOR/SCREW BN/BN,TIS/BN: HCPCS | Performed by: ORTHOPAEDIC SURGERY

## 2022-03-14 PROCEDURE — 82962 GLUCOSE BLOOD TEST: CPT

## 2022-03-14 PROCEDURE — 76060000035 HC ANESTHESIA 2 TO 2.5 HR: Performed by: ORTHOPAEDIC SURGERY

## 2022-03-14 PROCEDURE — G0378 HOSPITAL OBSERVATION PER HR: HCPCS

## 2022-03-14 PROCEDURE — 76210000016 HC OR PH I REC 1 TO 1.5 HR: Performed by: ORTHOPAEDIC SURGERY

## 2022-03-14 PROCEDURE — 77030003601 HC NDL NRV BLK BBMI -A

## 2022-03-14 PROCEDURE — 74011250637 HC RX REV CODE- 250/637: Performed by: PHYSICIAN ASSISTANT

## 2022-03-14 PROCEDURE — 74011000250 HC RX REV CODE- 250: Performed by: PHYSICIAN ASSISTANT

## 2022-03-14 PROCEDURE — 77030040922 HC BLNKT HYPOTHRM STRY -A

## 2022-03-14 PROCEDURE — 74011250636 HC RX REV CODE- 250/636: Performed by: PHYSICIAN ASSISTANT

## 2022-03-14 PROCEDURE — 77030031139 HC SUT VCRL2 J&J -A: Performed by: ORTHOPAEDIC SURGERY

## 2022-03-14 PROCEDURE — 77030014077 HC TOWER MX CEM J&J -C: Performed by: ORTHOPAEDIC SURGERY

## 2022-03-14 PROCEDURE — 77030035236 HC SUT PDS STRATFX BARB J&J -B: Performed by: ORTHOPAEDIC SURGERY

## 2022-03-14 PROCEDURE — 74011000250 HC RX REV CODE- 250: Performed by: STUDENT IN AN ORGANIZED HEALTH CARE EDUCATION/TRAINING PROGRAM

## 2022-03-14 PROCEDURE — 77030008462 HC STPLR SKN PROX J&J -A: Performed by: ORTHOPAEDIC SURGERY

## 2022-03-14 PROCEDURE — 74011250636 HC RX REV CODE- 250/636: Performed by: ANESTHESIOLOGY

## 2022-03-14 PROCEDURE — 74011250636 HC RX REV CODE- 250/636: Performed by: STUDENT IN AN ORGANIZED HEALTH CARE EDUCATION/TRAINING PROGRAM

## 2022-03-14 PROCEDURE — 2709999900 HC NON-CHARGEABLE SUPPLY: Performed by: ORTHOPAEDIC SURGERY

## 2022-03-14 PROCEDURE — 77030006822 HC BLD SAW SAG BRSM -B: Performed by: ORTHOPAEDIC SURGERY

## 2022-03-14 PROCEDURE — 77030040361 HC SLV COMPR DVT MDII -B

## 2022-03-14 PROCEDURE — 74011000258 HC RX REV CODE- 258: Performed by: STUDENT IN AN ORGANIZED HEALTH CARE EDUCATION/TRAINING PROGRAM

## 2022-03-14 PROCEDURE — 77030039825 HC MSK NSL PAP SUPERNO2VA VYRM -B: Performed by: STUDENT IN AN ORGANIZED HEALTH CARE EDUCATION/TRAINING PROGRAM

## 2022-03-14 DEVICE — IMPLANTABLE DEVICE
Type: IMPLANTABLE DEVICE | Site: KNEE | Status: FUNCTIONAL
Brand: PERSONA® NATURAL TIBIA®

## 2022-03-14 DEVICE — IMPLANTABLE DEVICE
Type: IMPLANTABLE DEVICE | Site: KNEE | Status: FUNCTIONAL
Brand: PERSONA® VIVACIT-E®

## 2022-03-14 DEVICE — SMARTSET GHV GENTAMICIN HIGH VISCOSITY BONE CEMENT 40G
Type: IMPLANTABLE DEVICE | Site: KNEE | Status: FUNCTIONAL
Brand: SMARTSET

## 2022-03-14 DEVICE — IMPLANTABLE DEVICE
Type: IMPLANTABLE DEVICE | Site: KNEE | Status: FUNCTIONAL
Brand: PERSONA®

## 2022-03-14 DEVICE — KNEE K1 TOT HEMI STD CEM IMPL CAPPED K1 ZIM: Type: IMPLANTABLE DEVICE | Status: FUNCTIONAL

## 2022-03-14 RX ORDER — FAMOTIDINE 20 MG/1
20 TABLET, FILM COATED ORAL EVERY 12 HOURS
Status: DISCONTINUED | OUTPATIENT
Start: 2022-03-14 | End: 2022-03-15 | Stop reason: HOSPADM

## 2022-03-14 RX ORDER — OXYCODONE HYDROCHLORIDE 5 MG/1
10 TABLET ORAL
Status: DISCONTINUED | OUTPATIENT
Start: 2022-03-14 | End: 2022-03-15 | Stop reason: HOSPADM

## 2022-03-14 RX ORDER — ASPIRIN 81 MG/1
81 TABLET ORAL
Status: DISCONTINUED | OUTPATIENT
Start: 2022-03-15 | End: 2022-03-15 | Stop reason: HOSPADM

## 2022-03-14 RX ORDER — ACETAMINOPHEN 500 MG
1000 TABLET ORAL ONCE
Status: COMPLETED | OUTPATIENT
Start: 2022-03-14 | End: 2022-03-14

## 2022-03-14 RX ORDER — SODIUM CHLORIDE 0.9 % (FLUSH) 0.9 %
5-40 SYRINGE (ML) INJECTION EVERY 8 HOURS
Status: DISCONTINUED | OUTPATIENT
Start: 2022-03-14 | End: 2022-03-14 | Stop reason: HOSPADM

## 2022-03-14 RX ORDER — SODIUM CHLORIDE 0.9 % (FLUSH) 0.9 %
5-40 SYRINGE (ML) INJECTION AS NEEDED
Status: DISCONTINUED | OUTPATIENT
Start: 2022-03-14 | End: 2022-03-14 | Stop reason: HOSPADM

## 2022-03-14 RX ORDER — GLYCOPYRROLATE 0.2 MG/ML
INJECTION INTRAMUSCULAR; INTRAVENOUS AS NEEDED
Status: DISCONTINUED | OUTPATIENT
Start: 2022-03-14 | End: 2022-03-14 | Stop reason: HOSPADM

## 2022-03-14 RX ORDER — POLYETHYLENE GLYCOL 3350 17 G/17G
17 POWDER, FOR SOLUTION ORAL DAILY
Status: DISCONTINUED | OUTPATIENT
Start: 2022-03-15 | End: 2022-03-15 | Stop reason: HOSPADM

## 2022-03-14 RX ORDER — PROPOFOL 10 MG/ML
INJECTION, EMULSION INTRAVENOUS
Status: DISCONTINUED | OUTPATIENT
Start: 2022-03-14 | End: 2022-03-14 | Stop reason: HOSPADM

## 2022-03-14 RX ORDER — ROPIVACAINE HYDROCHLORIDE 5 MG/ML
30 INJECTION, SOLUTION EPIDURAL; INFILTRATION; PERINEURAL AS NEEDED
Status: DISCONTINUED | OUTPATIENT
Start: 2022-03-14 | End: 2022-03-14 | Stop reason: HOSPADM

## 2022-03-14 RX ORDER — BUSPIRONE HYDROCHLORIDE 10 MG/1
10 TABLET ORAL
Status: DISCONTINUED | OUTPATIENT
Start: 2022-03-14 | End: 2022-03-14

## 2022-03-14 RX ORDER — SODIUM CHLORIDE 9 MG/ML
25 INJECTION, SOLUTION INTRAVENOUS CONTINUOUS
Status: DISCONTINUED | OUTPATIENT
Start: 2022-03-14 | End: 2022-03-14 | Stop reason: HOSPADM

## 2022-03-14 RX ORDER — ONDANSETRON 2 MG/ML
INJECTION INTRAMUSCULAR; INTRAVENOUS AS NEEDED
Status: DISCONTINUED | OUTPATIENT
Start: 2022-03-14 | End: 2022-03-14 | Stop reason: HOSPADM

## 2022-03-14 RX ORDER — MIDAZOLAM HYDROCHLORIDE 1 MG/ML
INJECTION, SOLUTION INTRAMUSCULAR; INTRAVENOUS AS NEEDED
Status: DISCONTINUED | OUTPATIENT
Start: 2022-03-14 | End: 2022-03-14 | Stop reason: HOSPADM

## 2022-03-14 RX ORDER — PHENYLEPHRINE HCL IN 0.9% NACL 0.4MG/10ML
SYRINGE (ML) INTRAVENOUS
Status: DISCONTINUED | OUTPATIENT
Start: 2022-03-14 | End: 2022-03-14 | Stop reason: HOSPADM

## 2022-03-14 RX ORDER — NALOXONE HYDROCHLORIDE 0.4 MG/ML
0.4 INJECTION, SOLUTION INTRAMUSCULAR; INTRAVENOUS; SUBCUTANEOUS AS NEEDED
Status: DISCONTINUED | OUTPATIENT
Start: 2022-03-14 | End: 2022-03-15 | Stop reason: HOSPADM

## 2022-03-14 RX ORDER — PREGABALIN 75 MG/1
75 CAPSULE ORAL ONCE
Status: COMPLETED | OUTPATIENT
Start: 2022-03-14 | End: 2022-03-14

## 2022-03-14 RX ORDER — HYDROMORPHONE HYDROCHLORIDE 1 MG/ML
0.5 INJECTION, SOLUTION INTRAMUSCULAR; INTRAVENOUS; SUBCUTANEOUS
Status: DISCONTINUED | OUTPATIENT
Start: 2022-03-14 | End: 2022-03-15 | Stop reason: HOSPADM

## 2022-03-14 RX ORDER — HYDROXYZINE HYDROCHLORIDE 10 MG/1
10 TABLET, FILM COATED ORAL
Status: DISCONTINUED | OUTPATIENT
Start: 2022-03-14 | End: 2022-03-15 | Stop reason: HOSPADM

## 2022-03-14 RX ORDER — MORPHINE SULFATE 2 MG/ML
2 INJECTION, SOLUTION INTRAMUSCULAR; INTRAVENOUS
Status: DISCONTINUED | OUTPATIENT
Start: 2022-03-14 | End: 2022-03-14 | Stop reason: HOSPADM

## 2022-03-14 RX ORDER — SODIUM CHLORIDE 0.9 % (FLUSH) 0.9 %
5-40 SYRINGE (ML) INJECTION AS NEEDED
Status: DISCONTINUED | OUTPATIENT
Start: 2022-03-14 | End: 2022-03-15 | Stop reason: HOSPADM

## 2022-03-14 RX ORDER — SODIUM CHLORIDE, SODIUM LACTATE, POTASSIUM CHLORIDE, CALCIUM CHLORIDE 600; 310; 30; 20 MG/100ML; MG/100ML; MG/100ML; MG/100ML
100 INJECTION, SOLUTION INTRAVENOUS CONTINUOUS
Status: DISCONTINUED | OUTPATIENT
Start: 2022-03-14 | End: 2022-03-14 | Stop reason: HOSPADM

## 2022-03-14 RX ORDER — CELECOXIB 200 MG/1
200 CAPSULE ORAL ONCE
Status: COMPLETED | OUTPATIENT
Start: 2022-03-14 | End: 2022-03-14

## 2022-03-14 RX ORDER — DIPHENHYDRAMINE HYDROCHLORIDE 50 MG/ML
12.5 INJECTION, SOLUTION INTRAMUSCULAR; INTRAVENOUS AS NEEDED
Status: DISCONTINUED | OUTPATIENT
Start: 2022-03-14 | End: 2022-03-14 | Stop reason: HOSPADM

## 2022-03-14 RX ORDER — BUPIVACAINE HYDROCHLORIDE 5 MG/ML
INJECTION, SOLUTION EPIDURAL; INTRACAUDAL
Status: COMPLETED | OUTPATIENT
Start: 2022-03-14 | End: 2022-03-14

## 2022-03-14 RX ORDER — TRAZODONE HYDROCHLORIDE 50 MG/1
50 TABLET ORAL
Status: DISCONTINUED | OUTPATIENT
Start: 2022-03-14 | End: 2022-03-14

## 2022-03-14 RX ORDER — VANCOMYCIN/0.9 % SOD CHLORIDE 1.5G/250ML
1500 PLASTIC BAG, INJECTION (ML) INTRAVENOUS ONCE
Status: COMPLETED | OUTPATIENT
Start: 2022-03-14 | End: 2022-03-14

## 2022-03-14 RX ORDER — FENTANYL CITRATE 50 UG/ML
25 INJECTION, SOLUTION INTRAMUSCULAR; INTRAVENOUS
Status: DISCONTINUED | OUTPATIENT
Start: 2022-03-14 | End: 2022-03-14 | Stop reason: HOSPADM

## 2022-03-14 RX ORDER — HYDROMORPHONE HYDROCHLORIDE 1 MG/ML
0.5 INJECTION, SOLUTION INTRAMUSCULAR; INTRAVENOUS; SUBCUTANEOUS
Status: DISCONTINUED | OUTPATIENT
Start: 2022-03-14 | End: 2022-03-14 | Stop reason: HOSPADM

## 2022-03-14 RX ORDER — ROPIVACAINE HYDROCHLORIDE 5 MG/ML
INJECTION, SOLUTION EPIDURAL; INFILTRATION; PERINEURAL
Status: COMPLETED | OUTPATIENT
Start: 2022-03-14 | End: 2022-03-14

## 2022-03-14 RX ORDER — SODIUM CHLORIDE 0.9 % (FLUSH) 0.9 %
5-40 SYRINGE (ML) INJECTION EVERY 8 HOURS
Status: DISCONTINUED | OUTPATIENT
Start: 2022-03-14 | End: 2022-03-15 | Stop reason: HOSPADM

## 2022-03-14 RX ORDER — FENOFIBRATE 145 MG/1
145 TABLET, COATED ORAL
Status: DISCONTINUED | OUTPATIENT
Start: 2022-03-14 | End: 2022-03-15 | Stop reason: HOSPADM

## 2022-03-14 RX ORDER — ACETAMINOPHEN 325 MG/1
650 TABLET ORAL ONCE
Status: DISCONTINUED | OUTPATIENT
Start: 2022-03-14 | End: 2022-03-14

## 2022-03-14 RX ORDER — MIDAZOLAM HYDROCHLORIDE 1 MG/ML
1 INJECTION, SOLUTION INTRAMUSCULAR; INTRAVENOUS AS NEEDED
Status: DISCONTINUED | OUTPATIENT
Start: 2022-03-14 | End: 2022-03-14 | Stop reason: HOSPADM

## 2022-03-14 RX ORDER — VANCOMYCIN/0.9 % SOD CHLORIDE 1.5G/250ML
1500 PLASTIC BAG, INJECTION (ML) INTRAVENOUS EVERY 12 HOURS
Status: COMPLETED | OUTPATIENT
Start: 2022-03-15 | End: 2022-03-15

## 2022-03-14 RX ORDER — FENTANYL CITRATE 50 UG/ML
50 INJECTION, SOLUTION INTRAMUSCULAR; INTRAVENOUS AS NEEDED
Status: DISCONTINUED | OUTPATIENT
Start: 2022-03-14 | End: 2022-03-14 | Stop reason: HOSPADM

## 2022-03-14 RX ORDER — LEVOTHYROXINE SODIUM 112 UG/1
112 TABLET ORAL
Status: DISCONTINUED | OUTPATIENT
Start: 2022-03-15 | End: 2022-03-15 | Stop reason: HOSPADM

## 2022-03-14 RX ORDER — DEXAMETHASONE SODIUM PHOSPHATE 4 MG/ML
INJECTION, SOLUTION INTRA-ARTICULAR; INTRALESIONAL; INTRAMUSCULAR; INTRAVENOUS; SOFT TISSUE AS NEEDED
Status: DISCONTINUED | OUTPATIENT
Start: 2022-03-14 | End: 2022-03-14 | Stop reason: HOSPADM

## 2022-03-14 RX ORDER — LOSARTAN POTASSIUM 50 MG/1
50 TABLET ORAL EVERY EVENING
Status: DISCONTINUED | OUTPATIENT
Start: 2022-03-15 | End: 2022-03-15 | Stop reason: HOSPADM

## 2022-03-14 RX ORDER — MIDAZOLAM HYDROCHLORIDE 1 MG/ML
0.5 INJECTION, SOLUTION INTRAMUSCULAR; INTRAVENOUS
Status: DISCONTINUED | OUTPATIENT
Start: 2022-03-14 | End: 2022-03-14 | Stop reason: HOSPADM

## 2022-03-14 RX ORDER — OXYCODONE HYDROCHLORIDE 5 MG/1
5 TABLET ORAL
Status: DISCONTINUED | OUTPATIENT
Start: 2022-03-14 | End: 2022-03-15 | Stop reason: HOSPADM

## 2022-03-14 RX ORDER — AMOXICILLIN 250 MG
1 CAPSULE ORAL 2 TIMES DAILY
Status: DISCONTINUED | OUTPATIENT
Start: 2022-03-14 | End: 2022-03-15 | Stop reason: HOSPADM

## 2022-03-14 RX ORDER — METOPROLOL SUCCINATE 25 MG/1
25 TABLET, EXTENDED RELEASE ORAL DAILY
Status: DISCONTINUED | OUTPATIENT
Start: 2022-03-15 | End: 2022-03-15 | Stop reason: HOSPADM

## 2022-03-14 RX ORDER — FACIAL-BODY WIPES
10 EACH TOPICAL DAILY PRN
Status: DISCONTINUED | OUTPATIENT
Start: 2022-03-16 | End: 2022-03-15 | Stop reason: HOSPADM

## 2022-03-14 RX ORDER — ONDANSETRON 2 MG/ML
4 INJECTION INTRAMUSCULAR; INTRAVENOUS AS NEEDED
Status: DISCONTINUED | OUTPATIENT
Start: 2022-03-14 | End: 2022-03-14 | Stop reason: HOSPADM

## 2022-03-14 RX ORDER — SODIUM CHLORIDE 9 MG/ML
125 INJECTION, SOLUTION INTRAVENOUS CONTINUOUS
Status: DISCONTINUED | OUTPATIENT
Start: 2022-03-14 | End: 2022-03-15 | Stop reason: HOSPADM

## 2022-03-14 RX ORDER — SODIUM CHLORIDE, SODIUM LACTATE, POTASSIUM CHLORIDE, CALCIUM CHLORIDE 600; 310; 30; 20 MG/100ML; MG/100ML; MG/100ML; MG/100ML
INJECTION, SOLUTION INTRAVENOUS
Status: DISCONTINUED | OUTPATIENT
Start: 2022-03-14 | End: 2022-03-14 | Stop reason: HOSPADM

## 2022-03-14 RX ORDER — LIDOCAINE HYDROCHLORIDE 10 MG/ML
0.1 INJECTION, SOLUTION EPIDURAL; INFILTRATION; INTRACAUDAL; PERINEURAL AS NEEDED
Status: DISCONTINUED | OUTPATIENT
Start: 2022-03-14 | End: 2022-03-14 | Stop reason: HOSPADM

## 2022-03-14 RX ORDER — ONDANSETRON 2 MG/ML
4 INJECTION INTRAMUSCULAR; INTRAVENOUS
Status: DISCONTINUED | OUTPATIENT
Start: 2022-03-14 | End: 2022-03-15 | Stop reason: HOSPADM

## 2022-03-14 RX ORDER — KETOROLAC TROMETHAMINE 30 MG/ML
15 INJECTION, SOLUTION INTRAMUSCULAR; INTRAVENOUS
Status: DISCONTINUED | OUTPATIENT
Start: 2022-03-14 | End: 2022-03-15 | Stop reason: HOSPADM

## 2022-03-14 RX ORDER — ACETAMINOPHEN 325 MG/1
650 TABLET ORAL EVERY 6 HOURS
Status: DISCONTINUED | OUTPATIENT
Start: 2022-03-14 | End: 2022-03-15 | Stop reason: HOSPADM

## 2022-03-14 RX ORDER — ESCITALOPRAM OXALATE 10 MG/1
20 TABLET ORAL
Status: DISCONTINUED | OUTPATIENT
Start: 2022-03-14 | End: 2022-03-15 | Stop reason: HOSPADM

## 2022-03-14 RX ORDER — SODIUM CHLORIDE 9 MG/ML
INJECTION, SOLUTION INTRAVENOUS
Status: DISCONTINUED | OUTPATIENT
Start: 2022-03-14 | End: 2022-03-14 | Stop reason: HOSPADM

## 2022-03-14 RX ORDER — ONDANSETRON 4 MG/1
4 TABLET, ORALLY DISINTEGRATING ORAL
Status: DISCONTINUED | OUTPATIENT
Start: 2022-03-14 | End: 2022-03-15 | Stop reason: HOSPADM

## 2022-03-14 RX ADMIN — PREGABALIN 75 MG: 75 CAPSULE ORAL at 11:47

## 2022-03-14 RX ADMIN — MIDAZOLAM 2 MG: 1 INJECTION INTRAMUSCULAR; INTRAVENOUS at 12:45

## 2022-03-14 RX ADMIN — Medication 50 MCG/MIN: at 12:52

## 2022-03-14 RX ADMIN — GLYCOPYRROLATE 0.2 MG: 0.2 INJECTION, SOLUTION INTRAMUSCULAR; INTRAVENOUS at 14:05

## 2022-03-14 RX ADMIN — TRANEXAMIC ACID 1 G: 100 INJECTION, SOLUTION INTRAVENOUS at 12:49

## 2022-03-14 RX ADMIN — PROPOFOL 40 MCG/KG/MIN: 10 INJECTION, EMULSION INTRAVENOUS at 12:41

## 2022-03-14 RX ADMIN — ESCITALOPRAM OXALATE 20 MG: 10 TABLET ORAL at 21:32

## 2022-03-14 RX ADMIN — DEXAMETHASONE SODIUM PHOSPHATE 4 MG: 4 INJECTION, SOLUTION INTRAMUSCULAR; INTRAVENOUS at 12:55

## 2022-03-14 RX ADMIN — ONDANSETRON HYDROCHLORIDE 4 MG: 2 INJECTION, SOLUTION INTRAMUSCULAR; INTRAVENOUS at 13:21

## 2022-03-14 RX ADMIN — CEFAZOLIN SODIUM 2 G: 1 INJECTION, POWDER, FOR SOLUTION INTRAMUSCULAR; INTRAVENOUS at 18:57

## 2022-03-14 RX ADMIN — DEXMEDETOMIDINE HYDROCHLORIDE 0.4 MCG/KG/HR: 100 INJECTION, SOLUTION, CONCENTRATE INTRAVENOUS at 12:40

## 2022-03-14 RX ADMIN — DEXMEDETOMIDINE HYDROCHLORIDE 4 MCG: 100 INJECTION, SOLUTION, CONCENTRATE INTRAVENOUS at 12:40

## 2022-03-14 RX ADMIN — FAMOTIDINE 20 MG: 20 TABLET, FILM COATED ORAL at 21:32

## 2022-03-14 RX ADMIN — SODIUM CHLORIDE: 900 INJECTION, SOLUTION INTRAVENOUS at 14:25

## 2022-03-14 RX ADMIN — VANCOMYCIN HYDROCHLORIDE 1500 MG: 10 INJECTION, POWDER, LYOPHILIZED, FOR SOLUTION INTRAVENOUS at 11:54

## 2022-03-14 RX ADMIN — SENNOSIDES AND DOCUSATE SODIUM 1 TABLET: 50; 8.6 TABLET ORAL at 18:58

## 2022-03-14 RX ADMIN — SODIUM CHLORIDE, POTASSIUM CHLORIDE, SODIUM LACTATE AND CALCIUM CHLORIDE 100 ML/HR: 600; 310; 30; 20 INJECTION, SOLUTION INTRAVENOUS at 11:48

## 2022-03-14 RX ADMIN — SODIUM CHLORIDE, POTASSIUM CHLORIDE, SODIUM LACTATE AND CALCIUM CHLORIDE: 600; 310; 30; 20 INJECTION, SOLUTION INTRAVENOUS at 12:40

## 2022-03-14 RX ADMIN — ACETAMINOPHEN 1000 MG: 500 TABLET ORAL at 11:47

## 2022-03-14 RX ADMIN — ROPIVACAINE HYDROCHLORIDE 30 ML: 5 INJECTION, SOLUTION EPIDURAL; INFILTRATION; PERINEURAL at 12:19

## 2022-03-14 RX ADMIN — OXYCODONE HYDROCHLORIDE 5 MG: 5 TABLET ORAL at 21:55

## 2022-03-14 RX ADMIN — OXYCODONE HYDROCHLORIDE 5 MG: 5 TABLET ORAL at 18:57

## 2022-03-14 RX ADMIN — CELECOXIB 200 MG: 200 CAPSULE ORAL at 11:47

## 2022-03-14 RX ADMIN — DEXMEDETOMIDINE HYDROCHLORIDE 4 MCG: 100 INJECTION, SOLUTION, CONCENTRATE INTRAVENOUS at 12:42

## 2022-03-14 RX ADMIN — DEXMEDETOMIDINE HYDROCHLORIDE 4 MCG: 100 INJECTION, SOLUTION, CONCENTRATE INTRAVENOUS at 12:41

## 2022-03-14 RX ADMIN — FENTANYL CITRATE 100 MCG: 50 INJECTION, SOLUTION INTRAMUSCULAR; INTRAVENOUS at 12:09

## 2022-03-14 RX ADMIN — BUPIVACAINE HYDROCHLORIDE 10 MG: 5 INJECTION, SOLUTION EPIDURAL; INTRACAUDAL; PERINEURAL at 12:46

## 2022-03-14 RX ADMIN — KETOROLAC TROMETHAMINE 15 MG: 30 INJECTION, SOLUTION INTRAMUSCULAR; INTRAVENOUS at 21:39

## 2022-03-14 RX ADMIN — MIDAZOLAM 2 MG: 1 INJECTION INTRAMUSCULAR; INTRAVENOUS at 12:09

## 2022-03-14 RX ADMIN — SODIUM CHLORIDE 500 ML: 900 INJECTION, SOLUTION INTRAVENOUS at 15:30

## 2022-03-14 RX ADMIN — ACETAMINOPHEN 650 MG: 325 TABLET ORAL at 18:58

## 2022-03-14 RX ADMIN — SODIUM CHLORIDE 125 ML/HR: 900 INJECTION, SOLUTION INTRAVENOUS at 18:30

## 2022-03-14 RX ADMIN — SODIUM CHLORIDE, PRESERVATIVE FREE 10 ML: 5 INJECTION INTRAVENOUS at 21:33

## 2022-03-14 NOTE — ANESTHESIA PROCEDURE NOTES
Spinal Block    Start time: 3/14/2022 12:41 PM  End time: 3/14/2022 12:46 PM  Performed by: Beba Vásquez MD  Authorized by: Cristian Plasencia DO     Pre-procedure:   Indications: primary anesthetic  Preanesthetic Checklist: patient identified, risks and benefits discussed, anesthesia consent, site marked, patient being monitored and timeout performed    Timeout Time: 12:41 EDT          Spinal Block:   Patient Position:  Seated  Prep Region:  Lumbar  Prep: Betadine and patient draped      Location:  L3-4  Technique:  Single shot        Needle:   Needle Type:  Pencil-tip  Needle Gauge:  25 G  Attempts:  1      Events: CSF confirmed, no blood with aspiration and no paresthesia        Assessment:  Insertion:  Uncomplicated  Patient tolerance:  Patient tolerated the procedure well with no immediate complications

## 2022-03-14 NOTE — OP NOTES
3/14/2022  OPERATIVE REPORT      PREOPERATIVE DIAGNOSIS: Osteoarthritis, left knee. POSTOPERATIVE DIAGNOSIS: Osteoarthritis, left knee. OPERATIVE PROCEDURE: left total knee replacement. SURGEON: Avni Barnett MD    ASSISTANT SURGEON: Nishant Velasco, [facility]    ANESTHESIA: Spinal.    Antibiotic:Ancef    INDICATIONS: : A 67 y.o.  female  with end stage osteoarthritis to the left knee, not responsive to conservative management. COMPLICATIONS:None    PROCEDURE: The patient was taken to the operating room, underwent  placement of spinal anesthesia. The knee and leg were prepped and  draped in the usual fashion. The leg was exsanguinated with the Esmarch  bandage and tourniquet inflated to 350 mmHg. A longitudinal midline  incision made down through skin and subcutaneous tissue. A medial  parapatellar arthrotomy was performed, and extended proximally along the  medial border of the quadriceps tendon, distally along the medial border of  the insertion of the patella tendon. Medial release was performed. Retropatellar fat pad was sharply excised. The patella was subluxated  laterally, the knee was flexed to 90 degrees. Drill was used to enter the  intramedullary canal of the distal femur. The 5 degree intramedullary guide  was applied and the distal femoral cut was performed. The femur was sized  to a 5. A 5 cutting block was applied, and the anterior, posterior,  chamfer cuts were performed. The extramedullary tibial guide was applied, and the tibia was cut in  neutral alignment. The tibia was sized to a E. Knee was balanced to varus  and valgus stress. Flexion and extension gaps were equal. Trial reduction  was performed, using 10 mm insert. Excellent range of motion and stability  were noted. The patella was everted, and the patella was cut using freehand  technique. Patella was sized to a 35. Drill holes were placed, and patella  button applied. The patella tracked normally.  All trial components were  removed, and surfaces were prepared using drill and punch. All residual  meniscal tissue was sharply excised. Hemostasis was obtained using Bovie  apparatus. All components were cemented in place, taking care to remove all  excess cement. The knee was maintained in full extension while the cement  hardened. The tourniquet was let down after a total tourniquet time of 52  minutes. Hemostasis was obtained using the Bovie apparatus. The joint was  extensively irrigated with antibiotic solution. The arthrotomy was repaired  using #2 Vicryl in interrupted figure-of-eight fashion. Subcutaneous tissue  was approximated using 2-0 Vicryl in interrupted fashion. Skin edges were  approximated using stapling apparatus. Joint was infiltrated with 30 mL of  0.5% Marcaine with epinephrine. Bulky sterile dressing was applied, and the  patient was transferred to recovery room in stable condition. There were no  Complications. The Physician Assistant was critical during the procedure by assisting with positioning of the leg, retraction, hemostasis, and wound closure. ESTIMATED BLOOD LOSS: 100 cc.     SPECIMEN: Bone      Wibler Manriquez M.D.  3/14/2022  2:19 PM

## 2022-03-14 NOTE — ANESTHESIA PREPROCEDURE EVALUATION
Relevant Problems   RESPIRATORY SYSTEM   (+) Hx MRSA infection      CARDIOVASCULAR   (+) Essential hypertension   (+) PVC (premature ventricular contraction)      ENDOCRINE   (+) Arthritis of right knee   (+) Hypothyroidism, secondary       Anesthetic History     PONV          Review of Systems / Medical History  Patient summary reviewed, nursing notes reviewed and pertinent labs reviewed    Pulmonary        Sleep apnea: CPAP           Neuro/Psych         Psychiatric history     Cardiovascular    Hypertension        Dysrhythmias : PVC        Comments: Nonischemic CM   GI/Hepatic/Renal  Within defined limits              Endo/Other    Diabetes  Hypothyroidism  Obesity, arthritis and cancer     Other Findings              Physical Exam    Airway  Mallampati: II  TM Distance: > 6 cm  Neck ROM: normal range of motion   Mouth opening: Normal     Cardiovascular  Regular rate and rhythm,  S1 and S2 normal,  no murmur, click, rub, or gallop             Dental  No notable dental hx       Pulmonary  Breath sounds clear to auscultation               Abdominal  GI exam deferred       Other Findings            Anesthetic Plan    ASA: 3  Anesthesia type: spinal      Post-op pain plan if not by surgeon: peripheral nerve block single      Anesthetic plan and risks discussed with: Patient

## 2022-03-14 NOTE — PERIOP NOTES
Tried giving report 45 minutes ago but was told nurse would need to call me back. Attempted to call again twice but no one is answering the phone. Will try again in a few minutes. Family called, updated and told room number.

## 2022-03-14 NOTE — ANESTHESIA PROCEDURE NOTES
Peripheral Block    Start time: 3/14/2022 12:10 PM  End time: 3/14/2022 12:17 PM  Performed by: Le Ortiz MD  Authorized by: Le Ortiz MD       Pre-procedure: Indications: at surgeon's request and post-op pain management    Preanesthetic Checklist: patient identified, risks and benefits discussed, site marked, timeout performed, anesthesia consent given and patient being monitored    Timeout Time: 12:10 EDT          Block Type:   Block Type:   Adductor canal  Laterality:  Left  Monitoring:  Standard ASA monitoring, continuous pulse ox, frequent vital sign checks, heart rate, responsive to questions and oxygen  Injection Technique:  Single shot  Procedures: ultrasound guided    Patient Position: supine  Prep: chlorhexidine    Location:  Mid thigh  Needle Type:  Stimuplex  Needle Gauge:  22 G  Needle Localization:  Ultrasound guidance  Medication Injected:  Ropivacaine (PF) (NAROPIN)(0.5%) 5 mg/mL injection, 30 mL    Assessment:  Number of attempts:  1  Injection Assessment:  Incremental injection every 5 mL, local visualized surrounding nerve on ultrasound, negative aspiration for blood, no paresthesia, no intravascular symptoms and negative aspiration for CSF  Patient tolerance:  Patient tolerated the procedure well with no immediate complications

## 2022-03-14 NOTE — PROGRESS NOTES
Ortho NP Note    POD# 0  s/p LEFT TOTAL KNEE ARTHROPLASTY     Pt seen in PACU. Very somnolent, falling asleep mid sentences. Denies pain to L knee, no N/V. Patient has not had something to eat/drink. No nausea. VSS Afebrile. Visit Vitals  BP (!) 94/51   Pulse 78   Temp 98.6 °F (37 °C)   Resp 14   Ht 5' 4\" (1.626 m)   Wt 85 kg (187 lb 6.3 oz)   SpO2 97%   BMI 32.17 kg/m²         Most Recent Labs:   Lab Results   Component Value Date/Time    HGB 11.5 03/09/2022 09:41 AM    Hemoglobin A1c 5.8 (H) 03/09/2022 09:41 AM       Body mass index is 32.17 kg/m². Reference: BMI greater than 30 is classified as obesity and greater than 40 is classified as morbid obesity. STOP BANG Score: + GARCIA dx, uses CPAP     Voiding status: remains due to void    Ace wrap + gauze to L LE c.d.i. Cryotherapy in place over incision. Bilateral LEs warm, dry. 1+ DP pulses  Sensation and motor intact diminished s/p spinal.  DF/PF/EHL 3+/5. Foot Pumps for mechanical DVT proph    Plan:  1) PT: starting today vs tomorrow pending arrival time to floor, WBAT  2) Luz Maria-op Antibiotics Vancomycin  3) Pain:  Received tylenol, celebrex, lyrica in preop. Perioperative anesthesia: Spinal.  Post operative analgesia includes scheduled tylenol and PRN toradol, oxycodone or IV dilaudid depending on pain severity  4) DVT Prophylaxis: Eliquis 2.5 mg PO BID. Encouraged early mobilization, bed exercises, and SCD use. 5) GI Prophylaxis: Pepcid BID  6) Hx of Hypertension: Current BP 94/51, HR 78. Noted hypotension in PACU. Home Toprol XL to be resumed in morning but held losartan for AM reevaluation. 7) Discharge plans: to home with HHPT and family support.           Ruth Ann Zuñiga NP

## 2022-03-14 NOTE — BRIEF OP NOTE
Brief Postoperative Note    Patient: Stef Eller  YOB: 1950  MRN: [id number]    Date of Procedure: 3/14/2022     Pre-Op Diagnosis: Primary osteoarthritis of left knee     Post-Op Diagnosis: Same as preoperative diagnosis. Procedure(s):  LEFT TOTAL KNEE ARTHROPLASTY    Surgeon(s):  Ca Marx MD    Surgical Assistant: Physician Assistant: Molly Snellen, PA-C  Surg Asst-1: Charito Duran    Anesthesia: General     Estimated Blood Loss (mL): less than 031     Complications: None    Specimens: * No specimens in log *     Implants:   Implant Name Type Inv.  Item Serial No.  Lot No. LRB No. Used Action   CEMENT BNE 40GM FULL DOSE PMMA W/ GENT HI VISC RADPQ LNG - SNA  CEMENT BNE 40GM FULL DOSE PMMA W/ GENT HI VISC RADPQ LNG NA [employer]_ [id number] Left 2 Implanted   FEM CR FEDE CCR NRW SZ 7 LT --  - SNA  FEM CR FEDE CCR NRW SZ 7 LT --  NA [employer] [id number] Left 1 Implanted   TIB PRN NP STM 5 DEG SZ DL -- PERSONA - SNA  TIB PRN NP STM 5 DEG SZ DL -- PERSONA NA [employer] [id number] Left 1 Implanted   PSN MC VE ASF L 10MM 6-7/CD - SNA  PSN MC VE ASF L 10MM 6-7/CD NA [employer] [id number] Left 1 Implanted   PAT PSN VE POLY 32MM -- PERSONA - SNA  PAT PSN VE POLY 32MM -- PERSONA NA [employer] [id number] Left 1 Implanted       Drains: * No LDAs found *    Findings: DJD    Electronically Signed by Catrina Love MD on 3/14/2022 at 2:18 PM

## 2022-03-15 VITALS
TEMPERATURE: 97.8 F | DIASTOLIC BLOOD PRESSURE: 66 MMHG | WEIGHT: 187.39 LBS | HEIGHT: 64 IN | OXYGEN SATURATION: 96 % | RESPIRATION RATE: 17 BRPM | HEART RATE: 67 BPM | BODY MASS INDEX: 31.99 KG/M2 | SYSTOLIC BLOOD PRESSURE: 112 MMHG

## 2022-03-15 LAB
ANION GAP SERPL CALC-SCNC: 5 MMOL/L (ref 5–15)
BUN SERPL-MCNC: 14 MG/DL (ref 6–20)
BUN/CREAT SERPL: 22 (ref 12–20)
CALCIUM SERPL-MCNC: 8.8 MG/DL (ref 8.5–10.1)
CHLORIDE SERPL-SCNC: 114 MMOL/L (ref 97–108)
CO2 SERPL-SCNC: 21 MMOL/L (ref 21–32)
CREAT SERPL-MCNC: 0.65 MG/DL (ref 0.55–1.02)
GLUCOSE BLD STRIP.AUTO-MCNC: 106 MG/DL (ref 65–117)
GLUCOSE SERPL-MCNC: 116 MG/DL (ref 65–100)
HGB BLD-MCNC: 9.9 G/DL (ref 11.5–16)
POTASSIUM SERPL-SCNC: 4.1 MMOL/L (ref 3.5–5.1)
SERVICE CMNT-IMP: NORMAL
SODIUM SERPL-SCNC: 140 MMOL/L (ref 136–145)

## 2022-03-15 PROCEDURE — 97535 SELF CARE MNGMENT TRAINING: CPT

## 2022-03-15 PROCEDURE — 97161 PT EVAL LOW COMPLEX 20 MIN: CPT

## 2022-03-15 PROCEDURE — 80048 BASIC METABOLIC PNL TOTAL CA: CPT

## 2022-03-15 PROCEDURE — 97165 OT EVAL LOW COMPLEX 30 MIN: CPT

## 2022-03-15 PROCEDURE — 85018 HEMOGLOBIN: CPT

## 2022-03-15 PROCEDURE — 74011250637 HC RX REV CODE- 250/637: Performed by: PHYSICIAN ASSISTANT

## 2022-03-15 PROCEDURE — 74011000250 HC RX REV CODE- 250: Performed by: PHYSICIAN ASSISTANT

## 2022-03-15 PROCEDURE — G0378 HOSPITAL OBSERVATION PER HR: HCPCS

## 2022-03-15 PROCEDURE — 97116 GAIT TRAINING THERAPY: CPT

## 2022-03-15 PROCEDURE — 36415 COLL VENOUS BLD VENIPUNCTURE: CPT

## 2022-03-15 PROCEDURE — 82962 GLUCOSE BLOOD TEST: CPT

## 2022-03-15 PROCEDURE — 74011250636 HC RX REV CODE- 250/636: Performed by: PHYSICIAN ASSISTANT

## 2022-03-15 RX ORDER — POLYETHYLENE GLYCOL 3350 17 G/17G
17 POWDER, FOR SOLUTION ORAL DAILY
Qty: 14 PACKET | Refills: 0 | Status: SHIPPED | OUTPATIENT
Start: 2022-03-16 | End: 2022-03-30

## 2022-03-15 RX ORDER — AMOXICILLIN 250 MG
1 CAPSULE ORAL 2 TIMES DAILY
Qty: 28 TABLET | Refills: 0 | Status: SHIPPED | OUTPATIENT
Start: 2022-03-15 | End: 2022-03-29

## 2022-03-15 RX ORDER — OXYCODONE HYDROCHLORIDE 5 MG/1
5-10 TABLET ORAL
Qty: 42 TABLET | Refills: 0 | Status: SHIPPED | OUTPATIENT
Start: 2022-03-15 | End: 2022-03-22

## 2022-03-15 RX ORDER — ACETAMINOPHEN 325 MG/1
650 TABLET ORAL EVERY 6 HOURS
Qty: 80 TABLET | Refills: 0 | Status: SHIPPED | OUTPATIENT
Start: 2022-03-15 | End: 2022-03-25

## 2022-03-15 RX ADMIN — OXYCODONE HYDROCHLORIDE 5 MG: 5 TABLET ORAL at 07:13

## 2022-03-15 RX ADMIN — KETOROLAC TROMETHAMINE 15 MG: 30 INJECTION, SOLUTION INTRAMUSCULAR; INTRAVENOUS at 07:13

## 2022-03-15 RX ADMIN — ACETAMINOPHEN 650 MG: 325 TABLET ORAL at 01:06

## 2022-03-15 RX ADMIN — APIXABAN 2.5 MG: 2.5 TABLET, FILM COATED ORAL at 08:45

## 2022-03-15 RX ADMIN — FAMOTIDINE 20 MG: 20 TABLET, FILM COATED ORAL at 08:45

## 2022-03-15 RX ADMIN — CEFAZOLIN SODIUM 2 G: 1 INJECTION, POWDER, FOR SOLUTION INTRAMUSCULAR; INTRAVENOUS at 03:32

## 2022-03-15 RX ADMIN — VANCOMYCIN HYDROCHLORIDE 1500 MG: 10 INJECTION, POWDER, LYOPHILIZED, FOR SOLUTION INTRAVENOUS at 01:03

## 2022-03-15 RX ADMIN — ACETAMINOPHEN 650 MG: 325 TABLET ORAL at 13:24

## 2022-03-15 RX ADMIN — POLYETHYLENE GLYCOL 3350 17 G: 17 POWDER, FOR SOLUTION ORAL at 08:44

## 2022-03-15 RX ADMIN — ACETAMINOPHEN 650 MG: 325 TABLET ORAL at 07:12

## 2022-03-15 RX ADMIN — OXYCODONE HYDROCHLORIDE 5 MG: 5 TABLET ORAL at 03:19

## 2022-03-15 RX ADMIN — OXYCODONE 10 MG: 5 TABLET ORAL at 10:39

## 2022-03-15 RX ADMIN — OXYCODONE 10 MG: 5 TABLET ORAL at 13:27

## 2022-03-15 RX ADMIN — SODIUM CHLORIDE, PRESERVATIVE FREE 10 ML: 5 INJECTION INTRAVENOUS at 07:13

## 2022-03-15 RX ADMIN — SODIUM CHLORIDE 125 ML/HR: 900 INJECTION, SOLUTION INTRAVENOUS at 03:39

## 2022-03-15 RX ADMIN — SENNOSIDES AND DOCUSATE SODIUM 1 TABLET: 50; 8.6 TABLET ORAL at 08:44

## 2022-03-15 RX ADMIN — LEVOTHYROXINE SODIUM 112 MCG: 0.11 TABLET ORAL at 08:44

## 2022-03-15 NOTE — PROGRESS NOTES
PHYSICAL THERAPY EVALUATION/DISCHARGE  Patient: Jeovanny Garza (73 y.o. female)  Date: 3/15/2022  Primary Diagnosis: Primary osteoarthritis of left knee [M17.12]  Osteoarthritis of left knee [M17.12]  Procedure(s) (LRB):  LEFT TOTAL KNEE ARTHROPLASTY (Left) 1 Day Post-Op   Precautions:   Fall,WBAT      ASSESSMENT  Based on the objective data described below, the patient presents with  impairment in functional mobility, activity tolerance and balance s/p L TKA. Patient is cleared for discharge from PT standpoint. Patient  is independent with post-op TKA exercise protocol and has same in written, illlustrated form. Educated patient on Discharge Instructions relating to PT program progression post-discharge. She demonstrated/verbalized understanding. Functional Outcome Measure: The patient scored 90/100 on the Barthel  outcome measure which is indicative of minimal impaired ability to care for basic self-needs/dependency on others. .      Other factors to consider for discharge: Motivated/A & O x 4/Supportive Family/Independent PLOF     Further skilled acute physical therapy is not indicated at this time. PLAN :  Recommendation for discharge: (in order for the patient to meet his/her long term goals)  Physical therapy at least 2 days/week in the home     This discharge recommendation:  Has been made in collaboration with the attending provider and/or case management    IF patient discharges home will need the following DME: patient owns DME required for discharge       SUBJECTIVE:   Patient stated I am doing okay.     OBJECTIVE DATA SUMMARY:   HISTORY:    Past Medical History:   Diagnosis Date    Anxiety     Autonomic dysfunction 12/4/98    tilt test with marked heart rate variablility and drop in BP without hemodynamic collapse    Bladder stone     Cancer ([facility] 1997    left breast lumpectomy , Rx RT    Diabetes ([facility]     pre-diabetic, NOT ON MEDICATION     Dilated cardiomyopathy ([facility] 2/8/06 nonischemic    Elevated triglycerides with high cholesterol 11/21/2014    Essential hypertension 2/9/2017    Hx MRSA infection 3/26/2018    [facility] Hypothyroidism 1994    Dr. Geeta Alcazar.    Insulin resistance 11/21/2014    Mixed hyperlipidemia with apolipoprotein E2 variant 11/21/2014    Osteoarthritis     diffuse. Dr. Hampton Askew Other and unspecified hyperlipidemia 12/21/2010    PONV (postoperative nausea and vomiting)     PVC's 1994    Sleep apnea     ON CPAP    Vitamin D deficiency 11/21/2014     Past Surgical History:   Procedure Laterality Date    HX APPENDECTOMY  1969    HX BILATERAL MASTECTOMY Bilateral 05/2015    HX BREAST AUGMENTATION Bilateral 12/18/2015    REVISION BILATERAL RECONSTRUCTED BREAST, FAT GRAFTING TO CHEST WALL, NIPPLE RECONSTRUCTION performed by Татьяна Muñoz MD at [address] HX BREAST AUGMENTATION Bilateral 10/14/2016    REVISION BILATERAL BREAST RECONSTRUCTION / FAT GRAFTING TO CHEST WALL; REVISION OF ABDOMINAL SCAR performed by Татьяна Muñoz MD at [address] [place] [address] HX BREAST LUMPECTOMY Left 1997    lumpectomy    HX BREAST RECONSTRUCTION Bilateral 9/28/2015    REVISION OF BILATERAL RECONSTRUCTED BREASTS, FAT GRAFTING TO CHEST WALL,  REVISION OF ABDOMINAL SCAR performed by Татьяна Muñoz MD at [address] [place] [address] HX CATARACT REMOVAL Bilateral 2008    with lens implants    HX CHOLECYSTECTOMY  2015    HX COLONOSCOPY  2014    HX COLONOSCOPY  2019    HX COLONOSCOPY  05/13/2021    HX ENDOSCOPY  2014    HX GI      COLONOSCOPY    HX HEART CATHETERIZATION  1994    LVEF 60%. Normal. Dr. Rima Dhillon.     HX HERNIA REPAIR  4/22 16    HX HYSTERECTOMY  1986    left 1 ovary    HX ORTHOPAEDIC Left 1988    foot surgery    HX POLYPECTOMY      HX SALPINGO-OOPHORECTOMY  1988    remaining ovary removed    HX SHOULDER REPLACEMENT Left 10/2020    HX UROLOGICAL      BLADDER LIFT    HX WISDOM TEETH EXTRACTION      MI TOTAL KNEE ARTHROPLASTY Right 2019       Prior level of function: PLOF: Independent with ADLs and IADLs. Personal factors and/or comorbidities impacting plan of care: Motivated/A & O x 4/Supportive Family/Independent PLOF    Home Situation  Home Environment: Private residence  # Steps to Enter: 5  Rails to Enter: Yes  Hand Rails : Right  Wheelchair Ramp: No  One/Two Story Residence: Two story, live on 1st floor  Lift Chair Available: No  Living Alone: No  Support Systems: Spouse/Significant Other  Patient Expects to be Discharged to[de-identified] Home with home health  Current DME Used/Available at Home: Cane, straight,Walker, rolling (reacher)  Tub or Shower Type: Shower    EXAMINATION/PRESENTATION/DECISION MAKING:   Critical Behavior:  Neurologic State: Alert  Orientation Level: Oriented X4  Cognition: Appropriate decision making,Appropriate for age attention/concentration,Appropriate safety awareness  Safety/Judgement: Awareness of environment,Home safety,Insight into deficits  Hearing:     Skin:  Incision covered with fresh abd pads and tape, no drainage appreciated. Edema: normal post-op inflammation  Range Of Motion:  AROM: Generally decreased, functional           PROM: Generally decreased, functional           Strength:    Strength: Generally decreased, functional                    Tone & Sensation:   Tone: Normal              Sensation: Intact               Coordination:  Coordination: Within functional limits  Vision:   Corrective Lenses: Glasses  Functional Mobility:  Bed Mobility:     Supine to Sit: Supervision  Sit to Supine: Supervision  Scooting: Supervision  Transfers:  Sit to Stand: Supervision  Stand to Sit: Supervision        Bed to Chair: Supervision              Balance:   Sitting: Intact  Standing: Intact; With support  Ambulation/Gait Training:  Distance (ft): 150 Feet (ft)  Assistive Device: Walker, rolling;Gait belt  Ambulation - Level of Assistance: Supervision        Gait Abnormalities: Antalgic     Left Side Weight Bearing: As tolerated  Base of Support: Widened;Shift to right  Stance: Right decreased  Speed/Lissy: Slow  Step Length: Left shortened  Swing Pattern: Right asymmetrical     Interventions: Safety awareness training;Verbal cues        Stairs:  Number of Stairs Trained: 4  Stairs - Level of Assistance: Supervision   Rail Use: Left  (Left Rail and Cane)    Therapeutic Exercises:   Patient  is independent with post-op TKA exercise protocol and has same in written, illlustrated form. Functional Measure:  Barthel Index:    Bathin  Bladder: 10  Bowels: 10  Groomin  Dressin  Feeding: 10  Mobility: 15  Stairs: 10  Toilet Use: 10  Transfer (Bed to Chair and Back): 15  Total: 90/100       The Barthel ADL Index: Guidelines  1. The index should be used as a record of what a patient does, not as a record of what a patient could do. 2. The main aim is to establish degree of independence from any help, physical or verbal, however minor and for whatever reason. 3. The need for supervision renders the patient not independent. 4. A patient's performance should be established using the best available evidence. Asking the patient, friends/relatives and nurses are the usual sources, but direct observation and common sense are also important. However direct testing is not needed. 5. Usually the patient's performance over the preceding 24-48 hours is important, but occasionally longer periods will be relevant. 6. Middle categories imply that the patient supplies over 50 per cent of the effort. 7. Use of aids to be independent is allowed. Score Interpretation (from [facility])    Independent   60-79 Minimally independent   40-59 Partially dependent   20-39 Very dependent   <20 Totally dependent     -Giorgi Ramirez., Barthel, D.W. (1965). Functional evaluation: the Barthel Index. [address] ([address] ().  The Barthel activities of daily living index: self-reporting versus actual performance in the old (> or = 75 years). Journal of [address] [place] [address] 457, [facility], VALDEMARF, Jann Wilson., Rodrick Jansen. (1999). Measuring the change in disability after inpatient rehabilitation; comparison of the responsiveness of the Barthel Index and Functional [place] Measure. Journal of Neurology, Neurosurgery, and Psychiatry, 66(4), 501-514. YESSICA Alfonso, STORMY Rice, & Annalise Morel M.A. (2004) Assessment of post-stroke quality of life in cost-effectiveness studies: The usefulness of the Barthel Index and the EuroQoL-5D. Quality of Life Research, 15, 796-45          Physical Therapy Evaluation Charge Determination   History Examination Presentation Decision-Making   LOW Complexity : Zero comorbidities / personal factors that will impact the outcome / POC LOW Complexity : 1-2 Standardized tests and measures addressing body structure, function, activity limitation and / or participation in recreation  LOW Complexity : Stable, uncomplicated  LOW Complexity : FOTO score of       Based on the above components, the patient evaluation is determined to be of the following complexity level: LOW     Pain Ratin/10    Activity Tolerance:   Good      After treatment patient left in no apparent distress:   Sitting in chair, Call bell within reach and OT present    COMMUNICATION/EDUCATION:   The patients plan of care was discussed with: Occupational therapist, Registered nurse, Physician and Case management. Fall prevention education was provided and the patient/caregiver indicated understanding., Patient/family have participated as able in goal setting and plan of care. and Patient/family agree to work toward stated goals and plan of care.     Thank you for this referral.  Ashish Starr   Time Calculation: 35 mins

## 2022-03-15 NOTE — PROGRESS NOTES
Ortho Daily Progress Note      Patient: Blue Mendez                   MRN: [id number]  Sex: female  YOB: 1950           Age: 67 y.o.     1 Day Post-Op     Procedure(s):  LEFT TOTAL KNEE ARTHROPLASTY    Subjective:    -Pain:  pt in moderate pain  -No complaints overnight. Patient ambulating with PT  -Patient tolerating PO diet, no nausea. Pain meds tolerated. -Patient voiding appropriately  -Vitals stable    Visit Vitals  /66 (BP 1 Location: Right upper arm, BP Patient Position: At rest)   Pulse 67   Temp 97.8 °F (36.6 °C)   Resp 17   Ht 5' 4\" (1.626 m)   Wt 85 kg (187 lb 6.3 oz)   SpO2 96%   BMI 32.17 kg/m²        Recent Labs     03/15/22  0322 03/09/22  0941 03/09/22  0941 12/07/21  1509 12/07/21  1509   HGB 9.9*   < > 11.5   < > 11.3   CREA 0.65   < > 0.68   < > 0.64   BUN 14  --  16  --  14    < > = values in this interval not displayed. Physical Exam:    GENERAL: alert, no acute distress  NEURO:  Sensation grossly intact,   VASCULAR: Baseline pedal pulses not palpable. Extremity warm. Moderate edema of LLE  DRESSING:  Clean, dry, and intact and ABD pad in place  MSK:  Moving lower extremities well  DVT EXAM: No posterior calf tenderness, tightness, erythema, palpable cords, or pain upon active dorsi/plantar flexion of the foot. Plan:    DVT ppx: Apixaban  Activity: WBAT with PT-mobilization  Pain Control: stable, mild-to-moderate joint symptoms intermittently, reasonably well controlled by current meds  Dressing: Change dressing daily with ABD pads. Hgb:  Stable, will continue to monitor patient  Discharge: Cleared by PT. Discharge home today.      Andrew Heath PA-C  3/15/2022   10:43 AM

## 2022-03-15 NOTE — DISCHARGE SUMMARY
Ortho Discharge Summary    Patient ID:  Dunia Us  [id number]  female  67 y.o.  1950    Admit date: 3/14/2022    Discharge date: 3/15/2022    Admitting Physician: Alissa Bucio MD     Consulting Physician(s):   Treatment Team: Attending Provider: Lisa Madrid MD; Utilization Review: Stef Jones; Staff Nurse: Deandre Ridley RN; Care Manager: Reinier Georges    Date of Surgery:   3/14/2022     Preoperative Diagnosis:  Primary osteoarthritis of left knee     Postoperative Diagnosis:   Primary osteoarthritis of left knee     Procedure(s):   LEFT TOTAL KNEE ARTHROPLASTY     Anesthesia Type:   General     Surgeon: Lisa Madrid MD                            HPI:  Pt is a 67 y.o. female who has a history of Primary osteoarthritis of left knee   with pain and limitations of activities of daily living who presents at this time for a left LEFT TOTAL KNEE ARTHROPLASTY following the failure of conservative management. PMH:   Past Medical History:   Diagnosis Date    Anxiety     Autonomic dysfunction 12/4/98    tilt test with marked heart rate variablility and drop in BP without hemodynamic collapse    Bladder stone     Cancer ([facility] [address] 1997    left breast lumpectomy , Rx RT    Diabetes ([address]     pre-diabetic, NOT ON MEDICATION     Dilated cardiomyopathy ([facility] [address] 2/8/06    nonischemic    Elevated triglycerides with high cholesterol 11/21/2014    Essential hypertension 2/9/2017    Hx MRSA infection 3/26/2018    [facility] Hypothyroidism 1994    Dr. Shanda Vernon.    Insulin resistance 11/21/2014    Mixed hyperlipidemia with apolipoprotein E2 variant 11/21/2014    Osteoarthritis     diffuse. Dr. Fuller Other and unspecified hyperlipidemia 12/21/2010    PONV (postoperative nausea and vomiting)     PVC's 1994    Sleep apnea     ON CPAP    Vitamin D deficiency 11/21/2014       Body mass index is 32.17 kg/m². : A BMI > 30 is classified as obesity and > 40 is classified as morbid obesity. Medications upon admission :   Prior to Admission Medications   Prescriptions Last Dose Informant Patient Reported? Taking? CYANOCOBALAMIN (VITAMIN B-12 IJ) 3/1/2022 Self Yes No   Sig: by Injection route every fourteen (14) days. SQ   alirocumab (Praluent Pen) 150 mg/mL injector pen Not Taking at Unknown time  No No   Si mL by SubCUTAneous route Once every 2 weeks. Patient not taking: Reported on 3/14/2022   aspirin delayed-release 81 mg tablet 3/9/2022  Yes No   Sig: Take 81 mg by mouth nightly. busPIRone (BUSPAR) 10 mg tablet Not Taking at Unknown time  Yes No   Sig: Take 10 mg by mouth daily as needed. Patient not taking: Reported on 3/14/2022   cholecalciferol (VITAMIN D3) (2,000 UNITS /50 MCG) cap capsule 3/9/2022  Yes No   Sig: Take  by mouth daily. clobetasoL (TEMOVATE) 0.05 % topical cream   Yes No   cyclobenzaprine (FLEXERIL) 5 mg tablet 3/11/2022  Yes No   Sig: Take 5 mg by mouth as needed for Muscle Spasm(s). escitalopram oxalate (LEXAPRO) 20 mg tablet 3/13/2022 at Unknown time  No Yes   Sig: Take 1 Tablet by mouth daily. Patient taking differently: Take 20 mg by mouth nightly. fenofibrate nanocrystallized (TRICOR) 145 mg tablet 3/12/2022  No No   Sig: Take 1 Tablet by mouth daily. Patient taking differently: Take 145 mg by mouth nightly. levothyroxine (SYNTHROID) 112 mcg tablet 3/14/2022 at 0700  No Yes   Sig: Take 1 Tablet by mouth Daily (before breakfast). losartan (COZAAR) 50 mg tablet 3/12/2022  No No   Sig: TAKE 1 TABLET DAILY   Patient taking differently: every evening. TAKE 1 TABLET DAILY   metoprolol succinate (TOPROL-XL) 25 mg XL tablet 3/14/2022 at 0700  No Yes   Sig: Take 1 Tab by mouth daily. multivitamin (ONE A DAY) tablet 3/9/2022  Yes No   Sig: Take 1 Tablet by mouth daily. traZODone (DESYREL) 50 mg tablet Not Taking at Unknown time  Yes No   Sig: Take 50 mg by mouth nightly as needed.    Patient not taking: Reported on 3/14/2022      Facility-Administered Medications: None        Allergies: Allergies   Allergen Reactions    Atorvastatin Myalgia    Lactose Diarrhea and Nausea Only    Sulfa (Sulfonamide Antibiotics) Hives        Hospital Course: The patient underwent surgery. Complications:  None; patient tolerated the procedure well. Was taken to the PACU in stable condition and then transferred to the ortho floor. Mobilized and ultimately cleared therapy on POD 1 for discharge to home       Perioperative Antibiotics:  Vancomycin, ancef     Postoperative Pain Management:  Oxycodone & Tylenol     DVT Prophylaxis:Eliquis 2.5 mg PO BID    Postoperative transfusions:    Number of units banked PRBCs =   none     Post Op complications: none    Hemoglobin at discharge:    Lab Results   Component Value Date/Time    HGB 9.9 (L) 03/15/2022 03:22 AM    INR 1.0 03/09/2022 09:41 AM       Dressing was changed on POD # 1, gauze and tape. Incision - clean, dry and intact. No significant erythema or swelling. Wound appears to be healing without any evidence of infection. Neurovascular exam found to be within normal limits. Physical Therapy started following surgery and participated in bed mobility, transfers and ambulation. Gait:  Gait  Base of Support: Widened,Shift to right  Speed/Lissy: Slow  Step Length: Left shortened  Swing Pattern: Right asymmetrical  Stance: Right decreased  Gait Abnormalities: Antalgic  Ambulation - Level of Assistance: Supervision  Distance (ft): 150 Feet (ft)  Assistive Device: Walker, rolling,Gait belt  Rail Use: Left  (Left Rail and Cane)  Stairs - Level of Assistance: Supervision  Number of Stairs Trained: 4  Interventions: Safety awareness training,Verbal cues            Interventions: Safety awareness training,Verbal cues      Discharged to: Home.     Condition on Discharge:   good    Discharge instructions:  - Anticoagulate with Eliquis 2.5 mg PO BID  - Take pain medications as prescribed  - Resume pre hospital diet      - Discharge activity: activity as tolerated  - Ambulate with assistive device as needed. - Weight bearing status as tolerated   - Wound Care Keep wound clean and dry. See discharge instruction sheet. -DISCHARGE MEDICATION LIST     Current Discharge Medication List      START taking these medications    Details   acetaminophen (TYLENOL) 325 mg tablet Take 2 Tablets by mouth every six (6) hours for 10 days. Qty: 80 Tablet, Refills: 0  Start date: 3/15/2022, End date: 3/25/2022      apixaban (ELIQUIS) 2.5 mg tablet Take 1 Tablet by mouth two (2) times a day for 27 doses. Qty: 27 Tablet, Refills: 0  Start date: 3/15/2022, End date: 3/29/2022      oxyCODONE IR (ROXICODONE) 5 mg immediate release tablet Take 1-2 Tablets by mouth every four (4) hours as needed for Pain for up to 7 days. Max Daily Amount: 60 mg.  Qty: 42 Tablet, Refills: 0  Start date: 3/15/2022, End date: 3/22/2022    Associated Diagnoses: S/P total knee replacement, left      polyethylene glycol (MIRALAX) 17 gram packet Take 1 Packet by mouth daily for 14 days. Qty: 14 Packet, Refills: 0  Start date: 3/16/2022, End date: 3/30/2022      senna-docusate (PERICOLACE) 8.6-50 mg per tablet Take 1 Tablet by mouth two (2) times a day for 14 days. Qty: 28 Tablet, Refills: 0  Start date: 3/15/2022, End date: 3/29/2022         CONTINUE these medications which have NOT CHANGED    Details   escitalopram oxalate (LEXAPRO) 20 mg tablet Take 1 Tablet by mouth daily. Qty: 90 Tablet, Refills: 2    Associated Diagnoses: Anxiety      levothyroxine (SYNTHROID) 112 mcg tablet Take 1 Tablet by mouth Daily (before breakfast). Qty: 90 Tablet, Refills: 2      metoprolol succinate (TOPROL-XL) 25 mg XL tablet Take 1 Tab by mouth daily. Qty: 90 Tab, Refills: 1    Associated Diagnoses: Other cardiomyopathy ([address] PVC (premature ventricular contraction)      cyclobenzaprine (FLEXERIL) 5 mg tablet Take 5 mg by mouth as needed for Muscle Spasm(s).       multivitamin (ONE A DAY) tablet Take 1 Tablet by mouth daily. losartan (COZAAR) 50 mg tablet TAKE 1 TABLET DAILY  Qty: 90 Tablet, Refills: 3      fenofibrate nanocrystallized (TRICOR) 145 mg tablet Take 1 Tablet by mouth daily. Qty: 90 Tablet, Refills: 3    Associated Diagnoses: Elevated triglycerides with high cholesterol; PVC (premature ventricular contraction); Essential hypertension      cholecalciferol (VITAMIN D3) (2,000 UNITS /50 MCG) cap capsule Take  by mouth daily. busPIRone (BUSPAR) 10 mg tablet Take 10 mg by mouth daily as needed. clobetasoL (TEMOVATE) 0.05 % topical cream       aspirin delayed-release 81 mg tablet Take 81 mg by mouth nightly. CYANOCOBALAMIN (VITAMIN B-12 IJ) by Injection route every fourteen (14) days.  SQ         STOP taking these medications       alirocumab (Praluent Pen) 150 mg/mL injector pen Comments:   Reason for Stopping:         traZODone (DESYREL) 50 mg tablet Comments:   Reason for Stopping:            per medical continuation form      -Follow up in office in 2 weeks      Signed:  Tiana Logan NP  Orthopaedic Nurse Practitioner    3/15/2022  11:20 AM

## 2022-03-15 NOTE — DISCHARGE INSTRUCTIONS
Discharge Instructions Knee Replacement  Dr. Henrry Malin      Patient Name  Yoon Mcknight  Date of procedure  3/14/2022    Procedure  Procedure(s):  LEFT TOTAL KNEE ARTHROPLASTY  Surgeon  Surgeon(s) and Role:     * Shireen Mata MD - Primary  Date of discharge: No discharge date for patient encounter. PCP: Bang Crenshaw NP    Follow up care   Follow up visit with Dr. Henrry Malin in 2 weeks. Call [phone] to make an appointment   The staples in your knee incision will be taken out at this follow up appointment    Activity at home   Take a short walk every hour; except at night when sleeping   Do your Home Exercise Program 3 times every day    After exercising lie down and elevate your leg on pillows for 15-30 minutes to decrease swelling   Refer to your patient notebook for more information  Bathing and caring for your incision   Change your knee dressing daily for one week. You may then leave your incision open to air unless you see drainage from your knee. o Use a dry dressing which consists of either sterile gauze or ABD pads. You can tape it on to your knee or use a wrap to secure it to the incision. o Do not apply lotions, ointments, or other products over the incision until it is completely healed.  You may take a shower and wash your incision with soap and water starting on the 3rd day after surgery.  Do not submerge the incision under water until your incision is completely healed (bath tub, hot tub, swimming pool, etc.)     Preventing blood clots   Take Eliquis 2.5mg twice a day as prescribed x14 days   Call Dr. Henrry Malin if you have side effects of blood thinning medication: bleeding, bruising, upset stomach, or diarrhea.     Call Dr. Henrry Malin for signs of a blood clot in your leg: calf pain, tenderness, redness, swelling of lower leg   Preventing lung congestion   Use your incentive spirometer 4 times a day; do 10 repetitions each time   Remember to keep the small blue ball between the two arrows when taking a slow, deep breath   Pain Management   Get up and walk a short distance to relieve pain and stiffness.  Place ice wrap on your knee except when you are walking. The gel ice packs should be changed about every 4 hours   Elevate your leg on pillows for 15-30 minutes    Take Tylenol 1000mg (take two 500mg tablets) every 6-8 hours for the next 2 weeks   Do not take any other anti-inflammatories (Aleve, Advil) besides the one you were prescribed (meloxicam/celecoxib). You may begin taking Advil/Aleve as needed for pain instead of the meloxicam AFTER you are finished with the blood thinner if you prefer.  If needed, take a narcotic pain pill every 4-6 hours as prescribed. Take with a small amount of food.  As your pain decreases, take the narcotics less often or take ½ of a pill   Call Dr. Robert Naik if you have side effects from your narcotic pain medication: itching, drowsiness, dizziness, upset stomach, dry mouth, constipation or if you medication is not relieving your pain. Diet after surgery   You may resume your normal diet. Include vegetables, fruit, whole grains, lean meats, and low-fat dairy products. Eat food high in fiber    Drink plenty of fluids, including 8 cups of water daily   Take over-the-counter stool softeners and laxatives to prevent constipation    Avoid after surgery   Do not take any over-the-counter medication for pain except Tylenol     Do not take more than 4000 mg (4 Grams) of Tylenol in 24 hours     Do not drink alcoholic beverages   Do not smoke   Do not drive until seen for follow up appointment   Do not place frozen gel pack directly on your skin. It can cause frostbite.    Prevention of falls and safety at home   Set up an area where you can rest comfortably leaving space around furniture to allow you to walk with your walker   Keep stairs, hallways and bathrooms well lit; especially at night   Arrange for care for your pets   Keep your home free of clcarlos alberto   Call Dr. Addie Hernandez at [phone] for:   Pain that is not relieved by pain medication, ice and activity   Side effects of medications   Increased/spread of bruising   Warning signs of infection:  ? persistent fever greater than 100 degrees  ? shaking or chills  ? increased redness, tenderness, swelling or drainage from incision  ? increased pain during activity or rest   Warning signs of a blood clot in your leg:  ? increased pain in your calf  ? tenderness or redness  ? increased swelling of knee, calf, ankle, or foot    If you call after 5pm or on a weekend, the on call physician will return your phone call  Call your Primary Care Doctor for:    Concerns about your medical conditions such as diabetes, high blood pressure, asthma, congestive heart failure.    Blood sugars greater than 180   Persistent headache or dizziness   Coughing or congestion   Constipation or diarrhea   Burning when you go to the bathroom   Abnormal heart rate (fast or slow)               Call 911 and go to the nearest hospital for:    Sudden increased shortness of breath   Sudden onset of chest pain   Difficulty breathing   Localized chest pain with coughing or taking a deep breath

## 2022-03-15 NOTE — PROGRESS NOTES
Occupational Therapy    Orders received, chart reviewed and patient evaluated by occupational therapy. Pending progression with skilled acute occupational therapy, recommend:  No skilled occupational therapy/ follow up rehabilitation needs identified at this time. Recommend with nursing ADLs with supervision/setup, OOB to chair 3x/day and toileting via functional mobility to and from bathroom Supervision assist and walker. Thank you for completing as able in order to maintain patient strength, endurance and independence. Full evaluation to follow.      Thank you,  Tita Malhotra, OT

## 2022-03-15 NOTE — PROGRESS NOTES
Problem: Falls - Risk of  Goal: *Absence of Falls  Description: Document Hans Lion Fall Risk and appropriate interventions in the flowsheet.   3/15/2022 1017 by Aydin Estrada RN  Outcome: Progressing Towards Goal  Note: Fall Risk Interventions:  Mobility Interventions: Patient to call before getting OOB    Medication Interventions: Patient to call before getting OOB    Elimination Interventions: Call light in reach,Elevated toilet seat,Patient to call for help with toileting needs    3/15/2022 1015 by Aydin Estrada RN  Outcome: Progressing Towards Goal  Note: Fall Risk Interventions:  Mobility Interventions: Patient to call before getting OOB    Medication Interventions: Patient to call before getting OOB    Elimination Interventions: Call light in reach,Elevated toilet seat,Patient to call for help with toileting needs    Problem: Patient Education: Go to Patient Education Activity  Goal: Patient/Family Education  3/15/2022 1017 by Aydin Estrada RN  Outcome: Progressing Towards Goal  3/15/2022 1015 by Aydin Estrada RN  Outcome: Progressing Towards Goal     Problem: Knee Replacement: Day of Surgery/Unit  Goal: Nutrition/Diet  Outcome: Progressing Towards Goal  Goal: Medications  Outcome: Progressing Towards Goal  Goal: Respiratory  Outcome: Progressing Towards Goal     Problem: Knee Replacement: Post-Op Day 1  Goal: Activity/Safety  Outcome: Progressing Towards Goal  Goal: Diagnostic Test/Procedures  Outcome: Progressing Towards Goal  Goal: Nutrition/Diet  Outcome: Progressing Towards Goal  Goal: Medications  Outcome: Progressing Towards Goal  Goal: Discharge Planning  Outcome: Progressing Towards Goal  Goal: *Demonstrates progressive activity  Outcome: Progressing Towards Goal  Goal: *Optimal pain control at patient's stated goal  Outcome: Progressing Towards Goal  Goal: *Hemodynamically stable  Outcome: Progressing Towards Goal  Goal: *Discharge plan identified  Outcome: Progressing Towards Goal

## 2022-03-15 NOTE — PROGRESS NOTES
OCCUPATIONAL THERAPY EVALUATION/DISCHARGE  Patient: Jose Miguel Mckeon (73 y.o. female)  Date: 3/15/2022  Primary Diagnosis: Primary osteoarthritis of left knee [M17.12]  Osteoarthritis of left knee [M17.12]  Procedure(s) (LRB):  LEFT TOTAL KNEE ARTHROPLASTY (Left) 1 Day Post-Op   Precautions:  Fall,WBAT    ASSESSMENT  Based on the objective data described below, the patient presents with decreased higher level mobility/balance/activity tolerance, decreased distal LE ADL management/reaching and 4/10 c/o L knee pain; however, she is demonstrating a high level of safe functional independence, completing RW level ADLs with Min A for distal LE management and Supervision for ADL related transfers. Pt noted with good understanding of modified dressing techniques, as well as, good hand placement pre/post transfer. Pt reports good in-home assist from , as well as, DME/AE needs fulfilled. Given pt's current high level of safe functional independence, DME/AE needs fulfilled and recommended level of ADL assist available within home environment, no other acute OT needs identified, with OT answering pt's questions/concerns and pt thanking therapist for his efforts. Current Level of Function (ADLs/self-care): Min A    Functional Outcome Measure: The patient scored  on the /100 outcome measure. Other factors to consider for discharge: none     PLAN :  Recommendation for discharge: (in order for the patient to meet his/her long term goals)  No skilled occupational therapy/ follow up rehabilitation needs identified at this time. This discharge recommendation:  Has been made in collaboration with the attending provider and/or case management    IF patient discharges home will need the following DME: patient owns DME required for discharge       SUBJECTIVE:   Patient stated I appreciate your help, you did a good job.     OBJECTIVE DATA SUMMARY:   HISTORY:   Past Medical History:   Diagnosis Date    Anxiety     Autonomic dysfunction 12/4/98    tilt test with marked heart rate variablility and drop in BP without hemodynamic collapse    Bladder stone     Cancer [facility]) 1997    left breast lumpectomy , Rx RT    Diabetes ([facility] [address] [address]     pre-diabetic, NOT ON MEDICATION     Dilated cardiomyopathy ([facility] [address] 2/8/06    nonischemic    Elevated triglycerides with high cholesterol 11/21/2014    Essential hypertension 2/9/2017    Hx MRSA infection 3/26/2018    ADAM Rivero Hypothyroidism 1994    Dr. Wagner River.    Insulin resistance 11/21/2014    Mixed hyperlipidemia with apolipoprotein E2 variant 11/21/2014    Osteoarthritis     diffuse. Dr. Sherry Camarena Other and unspecified hyperlipidemia 12/21/2010    PONV (postoperative nausea and vomiting)     PVC's 1994    Sleep apnea     ON CPAP    Vitamin D deficiency 11/21/2014     Past Surgical History:   Procedure Laterality Date    HX APPENDECTOMY  1969    HX BILATERAL MASTECTOMY Bilateral 05/2015    HX BREAST AUGMENTATION Bilateral 12/18/2015    REVISION BILATERAL RECONSTRUCTED BREAST, FAT GRAFTING TO CHEST WALL, NIPPLE RECONSTRUCTION performed by Deanne Nichols MD at [place] [address] HX BREAST AUGMENTATION Bilateral 10/14/2016    REVISION BILATERAL BREAST RECONSTRUCTION / FAT GRAFTING TO CHEST WALL; REVISION OF ABDOMINAL SCAR performed by Deanne Nichols MD at [address] [place] [address] HX BREAST LUMPECTOMY Left 1997    lumpectomy    HX BREAST RECONSTRUCTION Bilateral 9/28/2015    REVISION OF BILATERAL RECONSTRUCTED BREASTS, FAT GRAFTING TO CHEST WALL,  REVISION OF ABDOMINAL SCAR performed by Deanne Nichols MD at [place] [address] HX CATARACT REMOVAL Bilateral 2008    with lens implants    HX CHOLECYSTECTOMY  2015    HX COLONOSCOPY  2014    HX COLONOSCOPY  2019    HX COLONOSCOPY  05/13/2021    HX ENDOSCOPY  2014    HX GI      COLONOSCOPY    HX HEART CATHETERIZATION  1994    LVEF 60%. Normal. Dr. Morro Levine.     HX HERNIA REPAIR  4/22 [facility]    left 1 ovary    HX ORTHOPAEDIC Left 1988    foot surgery    HX POLYPECTOMY      HX SALPINGO-OOPHORECTOMY  1988    remaining ovary removed    HX SHOULDER REPLACEMENT Left 10/2020    HX UROLOGICAL      BLADDER LIFT    HX WISDOM TEETH EXTRACTION      [place] TOTAL KNEE ARTHROPLASTY Right 2019       Prior Level of Function/Environment/Context: Mod Independent  Expanded or extensive additional review of patient history:     Home Situation  Home Environment: Private residence  # Steps to Enter: 5  Rails to Enter: Yes  Hand Rails : Right  Wheelchair Ramp: No  One/Two Story Residence: Two story, live on 1st floor  Lift Chair Available: No  Living Alone: No  Support Systems: Spouse/Significant Other  Patient Expects to be Discharged to[de-identified] Home with home health  Current DME Used/Available at Home: Cane, straight,Walker, rolling (reacher)  Tub or Shower Type: Shower    Hand dominance: Right    EXAMINATION OF PERFORMANCE DEFICITS:  Cognitive/Behavioral Status:  Neurologic State: Alert  Orientation Level: Oriented X4  Cognition: Appropriate decision making; Appropriate for age attention/concentration; Appropriate safety awareness  Perception: Appears intact  Perseveration: No perseveration noted  Safety/Judgement: Awareness of environment;Home safety; Insight into deficits    Vision/Perceptual:    Corrective Lenses: Glasses    Range of Motion:  AROM: Generally decreased, functional  PROM: Generally decreased, functional                      Strength:  Strength: Generally decreased, functional                Coordination:  Coordination: Within functional limits            Tone & Sensation:  Tone: Normal  Sensation: Intact                      Balance:  Sitting: Intact  Standing: Intact; With support    Functional Mobility and Transfers for ADLs:  Bed Mobility:  Supine to Sit: Supervision  Sit to Supine: Supervision  Scooting: Supervision    Transfers:  Sit to Stand: Supervision  Stand to Sit: Supervision  Bed to Chair: Supervision    ADL Assessment:  Feeding: Independent    Oral Facial Hygiene/Grooming: Supervision    Bathing: Minimum assistance    Upper Body Dressing: Independent    Lower Body Dressing: Minimum assistance    Toileting: Supervision    ADL Interventions:  Cognitive Retraining  Safety/Judgement: Awareness of environment;Home safety; Insight into deficits    Bathing: Patient instructed and indicated understanding when bathing to not submerge wound in water, stand to shower or sponge bathe, cover wound with plastic and tape to ensure no water reaches bandage/wound without cues. Dressing joint: Patient instructed and demonstrated understanding to don/doff Left LE first/last with Min cues. Patient instructed and demonstrated to don all clothing while sitting prior to standing, doff all clothing to knees while standing, then sit to doff clothing off from knees to feet in order to facilitate fall prevention, pain management, and energy conservation with Minimum assistance. Dressing joint reach exercise: To increase independence with lower body dressing, patient instructed and demonstrated to reach down Left LE in a seated position slowly to prevent tearing/shearing until slight pull is felt, hold at end range for 10 seconds, then return to starting upright position with Supervision. Patient instructed to complete three sets of three repetitions each daily. Home safety: Patient instructed and indicated understanding on home modifications and safety (raise height of ADL objects, appropriate height of chair surfaces, recliner safety, change of floor surfaces, clear pathways) to increase independence and fall prevention. Standing: Patient instructed and demonstrated during ADLs to walk up to sink/counter top/surfaces, step into walker to increase safety of joint and fall prevention with Supervision. Patient educated about knee anatomy and educated to avoid rotation of Left LE.   Instructed to apply concept to ADLs within the home (no twisting of knee during reaching across body, square off while using objects, slide objects along surfaces). Patient instructed and indicated understanding to increase amount of time standing, observe standing position during ADLs in order to increase even weight bearing through bilateral LEs in order to increase independence with ADLs. Goal to be reached 30 days post - op, per orthopedic surgeon or per PT. Tub/shower transfer: Patient instructed and indicated understanding regarding when it is safe to begin transfer into tub/shower (complete stairs with PT, advance exercises with PT high enough to clear tub/shower height). Patient instructed to use the same technique as used with stairs when entering and exiting tub/shower (\"up with the non-surgical, down with the surgical leg\"). Functional Measure:    Barthel Index:  Bathin  Bladder: 10  Bowels: 10  Groomin  Dressin  Feeding: 10  Mobility: 15  Stairs: 10  Toilet Use: 10  Transfer (Bed to Chair and Back): 15  Total: 90/100      The Barthel ADL Index: Guidelines  1. The index should be used as a record of what a patient does, not as a record of what a patient could do. 2. The main aim is to establish degree of independence from any help, physical or verbal, however minor and for whatever reason. 3. The need for supervision renders the patient not independent. 4. A patient's performance should be established using the best available evidence. Asking the patient, friends/relatives and nurses are the usual sources, but direct observation and common sense are also important. However direct testing is not needed. 5. Usually the patient's performance over the preceding 24-48 hours is important, but occasionally longer periods will be relevant. 6. Middle categories imply that the patient supplies over 50 per cent of the effort. 7. Use of aids to be independent is allowed.     Score Interpretation (from [facility])    Independent   60-79 Minimally independent   40-59 Partially dependent   20-39 Very dependent   <20 Totally dependent     -Janet Ramirez, Barthel, D.W. ([id number]). Functional evaluation: the Barthel Index. [facility] ([address] (). The Barthel activities of daily living index: self-reporting versus actual performance in the old (> or = 75 years). Journal of [address] [place] [address] 45(7), 14 [facility], TATIANNA, Swapna Ruvalcaba., Hosea Mathew. (1999). Measuring the change in disability after inpatient rehabilitation; comparison of the responsiveness of the Barthel Index and Functional [place] Measure. Journal of Neurology, Neurosurgery, and Psychiatry, 66(4), 397-866. YESSICA Griffith, STORMY Rice, & Justus De León M.A. (2004) Assessment of post-stroke quality of life in cost-effectiveness studies: The usefulness of the Barthel Index and the EuroQoL-5D. Quality of Life Research, 15, 317-16       Occupational Therapy Evaluation Charge Determination   History Examination Decision-Making   LOW Complexity : Brief history review  LOW Complexity : 1-3 performance deficits relating to physical, cognitive , or psychosocial skils that result in activity limitations and / or participation restrictions  LOW Complexity : No comorbidities that affect functional and no verbal or physical assistance needed to complete eval tasks       Based on the above components, the patient evaluation is determined to be of the following complexity level: LOW   Pain Ratin/10 c/o L knee pain with movement; RN aware and following    Activity Tolerance:   Good, Fair and requires rest breaks    After treatment patient left in no apparent distress:    Sitting in chair and Call bell within reach    COMMUNICATION/EDUCATION:   The patients plan of care was discussed with: Physical therapist, Registered nurse and Case management.      Thank you for this referral.  Roxanne Renner OT  Time Calculation: 21 mins

## 2022-03-15 NOTE — PROGRESS NOTES
ALYSSIA: Plan for discharge home with  and HH. [facility] has accepted patient. Patient owns rolling walker. Family will transport patient home. Care Management Interventions  PCP Verified by CM: Yes  Mode of Transport at Discharge: Other (see comment) (family/car)  Transition of Care Consult (CM Consult): [address]: Yes  MyChart Signup: No  Discharge Durable Medical Equipment: No  Physical Therapy Consult: Yes  Occupational Therapy Consult: Yes  Speech Therapy Consult: No  Support Systems: Spouse/Significant Other  Confirm Follow Up Transport: Family  The Patient and/or Patient Representative was Provided with a Choice of Provider and Agrees with the Discharge Plan?: Yes  Freedom of Choice List was Provided with Basic Dialogue that Supports the Patient's Individualized Plan of Care/Goals, Treatment Preferences and Shares the Quality Data Associated with the Providers?: Yes  Discharge Location  Patient Expects to be Discharged to[de-identified] Home with home health    Medicare Outpatient Observation Notice (MOON)/ [place] Outpatient Observation Notice (Ancel Cords) provided to patient/representative with verbal explanation of the notice. Time allotted for questions regarding the notice. Patient /representative provided a completed copy of the MOON/VOON notice. Copy placed on bedside chart. [facility] denied referral. CM sent additional [facility] referrals.     BREN Kohler/TARAH

## 2022-03-15 NOTE — NURSE NAVIGATOR
[facility]  [facility] Handoff     FROM:                                TO: At [address]                                                      ([address] [place] [address] or Facility name)  Josiane Dwyer [address]  [address] [place] [address]  Dept: [address] [facility] [address]: [phone]                                      Room#:  575/01                                                       Nurse Navigator:  Susanne Barnett RN         SITUATION      ASAScore: ASA 3 - Patient with moderate systemic disease with functional limitations    Admitted:  3/14/2022  Hospital Day: 2      Attending Provider:  No att. providers found     Consultations:  None    PCP:  Vipin Rosa NP   [phone]     Admitting Dx:  Primary osteoarthritis of left knee [M17.12]  Osteoarthritis of left knee [M17.12]       Principal Problem:    Osteoarthritis of left knee (3/14/2022)      1 Day Post-Op of   Procedure(s):  LEFT TOTAL KNEE ARTHROPLASTY   BY: Jayesh Jensen MD             ON: 3/14/2022                  Code Status: Full Code             Advance Directive? No Doesnt Have (Send w/patient)     Isolation:  There are currently no Active Isolations       MDRO: No current active infections    BACKGROUND     Allergies:   Allergies   Allergen Reactions    Atorvastatin Myalgia    Lactose Diarrhea and Nausea Only    Sulfa (Sulfonamide Antibiotics) Hives       Past Medical History:   Diagnosis Date    Anxiety     Autonomic dysfunction 12/4/98    tilt test with marked heart rate variablility and drop in BP without hemodynamic collapse    Bladder stone     Cancer ([address] 1997    left breast lumpectomy , Rx RT    Diabetes ([address]     pre-diabetic, NOT ON MEDICATION     Dilated cardiomyopathy ([address] 2/8/06    nonischemic    Elevated triglycerides with high cholesterol 11/21/2014    Essential hypertension 2/9/2017    Hx MRSA infection 3/26/2018    ADAM Allen Dr. Lavinia.    Insulin resistance 11/21/2014    Mixed hyperlipidemia with apolipoprotein E2 variant 11/21/2014    Osteoarthritis     diffuse. Dr. Kristin Romero Other and unspecified hyperlipidemia 12/21/2010    PONV (postoperative nausea and vomiting)     PVC's 1994    Sleep apnea     ON CPAP    Vitamin D deficiency 11/21/2014       Past Surgical History:   Procedure Laterality Date    HX APPENDECTOMY  1969    HX BILATERAL MASTECTOMY Bilateral 05/2015    HX BREAST AUGMENTATION Bilateral 12/18/2015    REVISION BILATERAL RECONSTRUCTED BREAST, FAT GRAFTING TO CHEST WALL, NIPPLE RECONSTRUCTION performed by Anthoney Lennox, MD at [address] HX BREAST AUGMENTATION Bilateral 10/14/2016    REVISION BILATERAL BREAST RECONSTRUCTION / FAT GRAFTING TO CHEST WALL; REVISION OF ABDOMINAL SCAR performed by Anthoney Lennox, MD at [address] [place] [address] HX BREAST LUMPECTOMY Left 1997    lumpectomy    HX BREAST RECONSTRUCTION Bilateral 9/28/2015    REVISION OF BILATERAL RECONSTRUCTED BREASTS, FAT GRAFTING TO CHEST WALL,  REVISION OF ABDOMINAL SCAR performed by Anthoney Lennox, MD at [address] HX CATARACT REMOVAL Bilateral 2008    with lens implants    HX CHOLECYSTECTOMY  2015    HX COLONOSCOPY  2014    HX COLONOSCOPY  2019    HX COLONOSCOPY  05/13/2021    HX ENDOSCOPY  2014    HX GI      COLONOSCOPY    HX HEART CATHETERIZATION  1994    LVEF 60%. Normal. Dr. Yoav Calles.  HX HERNIA REPAIR  4/22 16    HX HYSTERECTOMY  1986    left 1 ovary    HX ORTHOPAEDIC Left 1988    foot surgery    HX POLYPECTOMY      HX SALPINGO-OOPHORECTOMY  1988    remaining ovary removed    HX SHOULDER REPLACEMENT Left 10/2020    HX UROLOGICAL      BLADDER LIFT    HX WISDOM TEETH EXTRACTION      [place] TOTAL KNEE ARTHROPLASTY Right 2019       Prior to Admission Medications   Prescriptions Last Dose Informant Patient Reported? Taking?    CYANOCOBALAMIN (VITAMIN B-12 IJ) 3/1/2022 Self Yes No   Sig: by Injection route every fourteen (14) days. SQ   alirocumab (Praluent Pen) 150 mg/mL injector pen Not Taking at Unknown time  No No   Si mL by SubCUTAneous route Once every 2 weeks. Patient not taking: Reported on 3/14/2022   aspirin delayed-release 81 mg tablet 3/9/2022  Yes No   Sig: Take 81 mg by mouth nightly. busPIRone (BUSPAR) 10 mg tablet Not Taking at Unknown time  Yes No   Sig: Take 10 mg by mouth daily as needed. Patient not taking: Reported on 3/14/2022   cholecalciferol (VITAMIN D3) (2,000 UNITS /50 MCG) cap capsule 3/9/2022  Yes No   Sig: Take  by mouth daily. clobetasoL (TEMOVATE) 0.05 % topical cream   Yes No   cyclobenzaprine (FLEXERIL) 5 mg tablet 3/11/2022  Yes No   Sig: Take 5 mg by mouth as needed for Muscle Spasm(s). escitalopram oxalate (LEXAPRO) 20 mg tablet 3/13/2022 at Unknown time  No Yes   Sig: Take 1 Tablet by mouth daily. Patient taking differently: Take 20 mg by mouth nightly. fenofibrate nanocrystallized (TRICOR) 145 mg tablet 3/12/2022  No No   Sig: Take 1 Tablet by mouth daily. Patient taking differently: Take 145 mg by mouth nightly. levothyroxine (SYNTHROID) 112 mcg tablet 3/14/2022 at 0700  No Yes   Sig: Take 1 Tablet by mouth Daily (before breakfast). losartan (COZAAR) 50 mg tablet 3/12/2022  No No   Sig: TAKE 1 TABLET DAILY   Patient taking differently: every evening. TAKE 1 TABLET DAILY   metoprolol succinate (TOPROL-XL) 25 mg XL tablet 3/14/2022 at 0700  No Yes   Sig: Take 1 Tab by mouth daily. multivitamin (ONE A DAY) tablet 3/9/2022  Yes No   Sig: Take 1 Tablet by mouth daily. traZODone (DESYREL) 50 mg tablet Not Taking at Unknown time  Yes No   Sig: Take 50 mg by mouth nightly as needed.    Patient not taking: Reported on 3/14/2022      Facility-Administered Medications: None       Vaccinations:    Immunization History   Administered Date(s) Administered    COVID-19, Pfizer Purple top, DILUTE for use, 12+ yrs, 30mcg/0.3mL dose 2021, 04/10/2021, 2021    Influenza High Dose Vaccine PF 09/22/2021    Influenza Vaccine 10/22/2017    Influenza Vaccine (>6 mo Afluria QUAD Vial 84744 (0.25 mL) / 91407 (0.5 mL)) 09/25/2018, 10/22/2019    Pneumococcal Polysaccharide (PPSV-23) 10/01/2017, 11/18/2019    Zoster Recombinant 11/18/2019, 02/10/2020         ASSESSMENT   Age: 67 y.o. Gender: female        Height: Height: 5' 4\" (162.6 cm)                    Weight:Weight: 85 kg (187 lb 6.3 oz)     No data found. Active Orders   Diet    ADULT DIET Regular       Orientation: Orientation Level: Oriented X4    Active Lines/Drains:  (Peg Tube / Damian / CL or S/L?):no    Urinary Status: Voiding      Last BM: Last Bowel Movement Date: 03/13/22     Skin Integrity: Incision (comment)             Mobility: Slightly limited   Weight Bearing Status: WBAT (Weight Bearing as Tolerated)      Gait Training  Assistive Device: Walker, rolling,Gait belt  Ambulation - Level of Assistance: Supervision  Distance (ft): 150 Feet (ft)  Stairs - Level of Assistance: Supervision  Number of Stairs Trained: 4  Rail Use: Left  (Left Rail and Cane)  Interventions: Safety awareness training,Verbal cues     On Anticoagulation?  YES  Eliquis                                         Pain Medications given:  oxycodone                                   Lab Results   Component Value Date/Time    Glucose 116 (H) 03/15/2022 03:22 AM    Glucose 97 03/04/2021 10:32 AM    Hemoglobin A1c 5.8 (H) 03/09/2022 09:41 AM    INR 1.0 03/09/2022 09:41 AM    INR 1.1 03/26/2018 11:47 AM    HGB 9.9 (L) 03/15/2022 03:22 AM    HGB 11.5 03/09/2022 09:41 AM    HGB 11.3 12/07/2021 03:09 PM    HGB 11.9 05/07/2021 12:40 PM       Readmission Risks:  Score:         RECOMMENDATION     See After Visit Summary (AVS) for:  · Discharge instructions  · After [address]   · Medication Reconciliation          [facility] Orthopaedic Nurse Navigator  Adela Cabot, BSN, RN-BC       Office  [phone]  Cell      [phone]  Fax [phone]  [email or web address]             . Phy

## 2022-03-15 NOTE — PROGRESS NOTES
Ortho NP Note    POD# 1  s/p LEFT TOTAL KNEE ARTHROPLASTY   Pt seen in room. Pt resting in bed. Denies pain at rest but endorses pain to L knee as expected in post op period. States it is well controlled with oxycodone       VSS Afebrile. Visit Vitals  /66 (BP 1 Location: Right upper arm, BP Patient Position: At rest)   Pulse 67   Temp 97.8 °F (36.6 °C)   Resp 17   Ht 5' 4\" (1.626 m)   Wt 85 kg (187 lb 6.3 oz)   SpO2 96%   BMI 32.17 kg/m²       Voiding status: spontaneous void  Output (mL)  Urine Voided: 350 ml (03/15/22 0402)  Last Bowel Movement Date: 03/13/22 (03/15/22 3193)  Straight Cath  Straight Cath: Nurse performed cath (03/14/22 1456)  Number of Attempts: 3 (03/14/22 1456)  Catheter Size: 16 FR (03/14/22 1456)  Time Catheter Inserted: 5915 (03/14/22 1456)  Time Catheter Removed: 6417 (03/14/22 1456)  Urine: 200 mL (03/14/22 1456)      Labs    Lab Results   Component Value Date/Time    HGB 9.9 (L) 03/15/2022 03:22 AM      Lab Results   Component Value Date/Time    INR 1.0 03/09/2022 09:41 AM      Lab Results   Component Value Date/Time    Sodium 140 03/15/2022 03:22 AM    Potassium 4.1 03/15/2022 03:22 AM    Chloride 114 (H) 03/15/2022 03:22 AM    CO2 21 03/15/2022 03:22 AM    Glucose 116 (H) 03/15/2022 03:22 AM    Glucose 97 03/04/2021 10:32 AM    BUN 14 03/15/2022 03:22 AM    Creatinine 0.65 03/15/2022 03:22 AM    Calcium 8.8 03/15/2022 03:22 AM     Recent Glucose Results:   Lab Results   Component Value Date/Time     (H) 03/15/2022 03:22 AM    GLUCPOC 106 03/15/2022 06:20 AM    GLUCPOC 168 (H) 03/14/2022 09:08 PM    GLUCPOC 136 (H) 03/14/2022 03:44 PM           Body mass index is 32.17 kg/m². : A BMI > 30 is classified as obesity and > 40 is classified as morbid obesity. Gauze and tape dressing to L knee c.d.i  Cryotherapy in place over incision  Calves soft and supple; No pain with passive stretch  Bilateral LEs warm, dry. 2+ DP pulses.     Sensation and motor intact - PF/DF/EHL intact 5/5  Foot Pumps for mechanical DVT proph while in bed     PLAN:  1) PT: BID WBAT  2) Anticoagulation:  Eliquis 2.5 mg PO BID for DVT Prophylaxis. Encouraged early mobilization, bed exercises, and SCD use. 3) GI Prophylaxis - Pepcid  4) Pain - Multimodal approach including cryotherapy, scheduled Tylenol with  PRN toradol, oxycodone & IV dilaudid depending on severity  5) Post operative anemia: Hgb today: 9.9. Was 11.5 on 3/9. No active bleeding noted. Expected Acute blood loss post-op anemia  6) Hx of Hypertension: Current /66, HR 68. Blood pressures soft since surgery. Holding medications at present, continue routine VS and resume when SBP consistently > 125.   7) Hx of Hypothyroidism: Continue home synthroid  8) Readniess for discharge:     [x] Vital Signs stable    [x] Hgb stable    [x] + Voiding    [x] Wound intact, drainage minimal    [x] Tolerating PO intake     [] Cleared by PT (OT if applicable)     [] Stair training completed (if applicable)    [] Independent / Contact Guard Assist (household distance)     [] Bed mobility     [] Car transfers     [] ADLs    [x] Adequate pain control on oral medication alone     Possible discharge to home with HHPT today pending therapy clearance.      Roselia Deshpande, NP  Available via [employer]

## 2022-03-16 NOTE — ANESTHESIA POSTPROCEDURE EVALUATION
Post-Anesthesia Evaluation and Assessment    Patient: Chiquita Wild MRN: [id number]  SSN: [id number]    YOB: 1950  Age: 67 y.o. Sex: female      I have evaluated the patient and they are stable and ready for discharge from the PACU. Cardiovascular Function/Vital Signs    Vitals Value Taken Time   /67 03/14/22 1730   Temp 36.7 °C (98 °F) 03/14/22 1631   Pulse 85 03/14/22 1755   Resp 20 03/14/22 1755   SpO2 95 % 03/14/22 1755       Patient is status post General anesthesia for Procedure(s):  LEFT TOTAL KNEE ARTHROPLASTY. Nausea/Vomiting: None    Postoperative hydration reviewed and adequate. Pain:  Managed    Neurological Status: At baseline    Mental Status, Level of Consciousness: Alert and  oriented to person, place, and time    Pulmonary Status:   Adequate oxygenation and airway patent    Complications related to anesthesia: None    Post-anesthesia assessment completed. No concerns.      Signed By: Jessa Brock DO     March 14, 2022

## 2022-03-17 ENCOUNTER — PATIENT OUTREACH (OUTPATIENT)
Dept: CASE MANAGEMENT | Age: 72
End: 2022-03-17

## 2022-03-17 NOTE — PROGRESS NOTES
Post Discharge Follow-up contact after Joint Replacement    Patient discharged on 3/15/22  By  Aimee Méndez   following  left knee Arthroplasty. Spoke with patient today, who reports that \" I am pretty pleased with progress so far. \"  Denies Fever, Shortness of Breath or Chest Pain. Home Health has visited. Patient also reports:  Gauze dressing is clean, dry, intact, and changed daily  Calf is non-tender, operative extremity has moderate swelling. Pain is well managed. Discussed use of ice & elevation. Patient is progressing with therapy and is exercising independently. Taking Eliquis for anticoagulation, oxycodone and Tylenol for pain. Patient is experiencing symptoms of constipation & urinating without difficulty. Discussed side effects of anticoagulants & pain medications (bleeding/bruising, constipation, lightheaded/dizziness)  Follow up appointment is scheduled for 3/25. Discussed calling surgeon Dr Mercedes Lewis  for drainage, bleeding, swelling in operative extremity, fever or pain. Discussed calling PCP CELI Rodriguez NP with other medical issues.

## 2022-03-19 PROBLEM — M17.11 ARTHRITIS OF RIGHT KNEE: Status: ACTIVE | Noted: 2018-04-09

## 2022-03-19 PROBLEM — M17.12 OSTEOARTHRITIS OF LEFT KNEE: Status: ACTIVE | Noted: 2022-03-14

## 2022-03-19 PROBLEM — M54.10 RADICULAR SYNDROME OF RIGHT LEG: Status: ACTIVE | Noted: 2018-03-01

## 2022-03-19 PROBLEM — M54.16 LUMBAR RADICULOPATHY, RIGHT: Status: ACTIVE | Noted: 2018-03-01

## 2022-03-19 PROBLEM — Z86.14 HX MRSA INFECTION: Status: ACTIVE | Noted: 2018-03-26

## 2022-03-19 PROBLEM — G62.9 SENSORIMOTOR NEUROPATHY: Status: ACTIVE | Noted: 2018-03-01

## 2022-03-19 PROBLEM — I10 ESSENTIAL HYPERTENSION: Status: ACTIVE | Noted: 2017-02-09

## 2022-03-19 PROBLEM — R73.01 IMPAIRED FASTING GLUCOSE: Status: ACTIVE | Noted: 2021-12-07

## 2022-03-24 ENCOUNTER — DOCUMENTATION ONLY (OUTPATIENT)
Dept: CARDIOLOGY CLINIC | Age: 72
End: 2022-03-24

## 2022-03-24 NOTE — PROGRESS NOTES
Member ID [id number]  Authorization # [id number]    Approved for 2/21/22 until further notice. 4 units per 28 days. May fill up to a 90 day supply except for those on specialty tier 5.   [facility] Member [employer]ii [phone]

## 2022-04-20 DIAGNOSIS — F41.9 ANXIETY: ICD-10-CM

## 2022-04-20 RX ORDER — ESCITALOPRAM OXALATE 20 MG/1
20 TABLET ORAL DAILY
Qty: 90 TABLET | Refills: 2 | Status: SHIPPED | OUTPATIENT
Start: 2022-04-20 | End: 2022-08-22 | Stop reason: SDUPTHER

## 2022-04-20 RX ORDER — LEVOTHYROXINE SODIUM 112 UG/1
112 TABLET ORAL
Qty: 90 TABLET | Refills: 2 | Status: SHIPPED | OUTPATIENT
Start: 2022-04-20 | End: 2022-08-22 | Stop reason: SDUPTHER

## 2022-04-26 ENCOUNTER — OFFICE VISIT (OUTPATIENT)
Dept: NEUROLOGY | Age: 72
End: 2022-04-26
Payer: MEDICARE

## 2022-04-26 VITALS
BODY MASS INDEX: 31.92 KG/M2 | HEART RATE: 88 BPM | HEIGHT: 64 IN | DIASTOLIC BLOOD PRESSURE: 72 MMHG | SYSTOLIC BLOOD PRESSURE: 122 MMHG | WEIGHT: 187 LBS | RESPIRATION RATE: 16 BRPM | OXYGEN SATURATION: 97 %

## 2022-04-26 DIAGNOSIS — G62.9 SENSORIMOTOR NEUROPATHY: ICD-10-CM

## 2022-04-26 DIAGNOSIS — M54.16 ACUTE LEFT LUMBAR RADICULOPATHY: Primary | ICD-10-CM

## 2022-04-26 PROCEDURE — 99214 OFFICE O/P EST MOD 30 MIN: CPT | Performed by: PSYCHIATRY & NEUROLOGY

## 2022-04-26 PROCEDURE — 1101F PT FALLS ASSESS-DOCD LE1/YR: CPT | Performed by: PSYCHIATRY & NEUROLOGY

## 2022-04-26 PROCEDURE — G8510 SCR DEP NEG, NO PLAN REQD: HCPCS | Performed by: PSYCHIATRY & NEUROLOGY

## 2022-04-26 PROCEDURE — G8427 DOCREV CUR MEDS BY ELIG CLIN: HCPCS | Performed by: PSYCHIATRY & NEUROLOGY

## 2022-04-26 PROCEDURE — 1090F PRES/ABSN URINE INCON ASSESS: CPT | Performed by: PSYCHIATRY & NEUROLOGY

## 2022-04-26 PROCEDURE — G8417 CALC BMI ABV UP PARAM F/U: HCPCS | Performed by: PSYCHIATRY & NEUROLOGY

## 2022-04-26 PROCEDURE — G8752 SYS BP LESS 140: HCPCS | Performed by: PSYCHIATRY & NEUROLOGY

## 2022-04-26 PROCEDURE — G8536 NO DOC ELDER MAL SCRN: HCPCS | Performed by: PSYCHIATRY & NEUROLOGY

## 2022-04-26 PROCEDURE — G8754 DIAS BP LESS 90: HCPCS | Performed by: PSYCHIATRY & NEUROLOGY

## 2022-04-26 PROCEDURE — 3017F COLORECTAL CA SCREEN DOC REV: CPT | Performed by: PSYCHIATRY & NEUROLOGY

## 2022-04-26 RX ORDER — VENLAFAXINE HYDROCHLORIDE 37.5 MG/1
37.5 CAPSULE, EXTENDED RELEASE ORAL DAILY
Qty: 30 CAPSULE | Refills: 0 | Status: SHIPPED | OUTPATIENT
Start: 2022-04-26 | End: 2022-04-29 | Stop reason: SDUPTHER

## 2022-04-26 NOTE — PATIENT INSTRUCTIONS
We discussed the pain you're having in your left lower back, radiating down your left leg sounds like a pinched nerve in back (most likely involving the L5 nerve root). I recommend you ask your Ortho-Knee specialist to refer you to one of his/ her colleagues who specializes in Spine (ie Ortho-Spine) to discuss back injection for this problem. For your persistent peripheral Neuropathy symptoms, you wanted to try Effexor, and antidepressant, that might help with neuropathy symptoms. I'm starting you on the small dose, 37.5 mg once a day. Send me a [employer] message in 3 weeks letting me know if this his helping or not. If it's not helping, then I would increase the dose to 75 mg/ day at that point.     Follow up here in 6 weeks to reassess the peripheral neuropathy

## 2022-04-26 NOTE — PROGRESS NOTES
Chief Complaint   Patient presents with    Follow-up     neuropathy bilateral legs with pain from lumbar radiating down left leg to her foot, she s/p left TKR with Dr Masood Anderson 3/14/2022, she saw Dr Masood Anderson and it is not knee surgery related     Visit Vitals  /72   Pulse 88   Resp 16   Ht 5' 4\" (1.626 m)   Wt 84.8 kg (187 lb)   SpO2 97%   BMI 32.10 kg/m²

## 2022-04-26 NOTE — PROGRESS NOTES
Percy Cisneros (1950) is a 67 y.o. female, established patient, here for evaluation of the following     Chief complaint(s):   Chief Complaint   Patient presents with    Follow-up     neuropathy bilateral legs with pain from lumbar radiating down left leg to her foot, she s/p left TKR with Dr Masood Anderson 3/14/2022, she saw Dr Masood Anderson and it is not knee surgery related       SUBJECTIVE/ OBJECTIVE:    HPI: 67 y.o. female with hx of idiopathic peripheral neuropathy, long-standing hx of Pre-DM    1) Left leg pain (new complaint to me)  Patient reports that 3-4 days after she underwent left TKA she started having severe left lower back pain rating down the front/lateral side of her thigh, down the front of her lower leg into the top of the foot. He has discussed this with her Ortho knee specialist and she is currently taking 6-day course of prednisone which she says only takes the edge off the pain. She says orthopedic recommended she see neurology for this. She had MRI L-spine in Jan 2021 that showed mild spinal canal stenosis, mild bilateral foraminal stenosis, and disc degeneration at the L4-5 level. 2) Peripheral Neuropathy:   Last seen in March 2021. Had discussed at that visit that of her 2 Glucose tolerance tests, the 2nd one showed an abnormal 2 hour post-prandial glucose, suggestive of pre-DM. She had tried/ failed multiple medications: Gabapentin (side effects), Cymbalta (side effects), Lyrica 100 mg BID (pt didn't want to go higher, d/c'd). Suggested trying Effexor, but not clear if she started taking it. She reports her neuropathy symptoms have gradually gotten worse. D/w her if she'd like to reattempt Gabapentin, Lyrica, or Cymbalta, she declines (due to SEFx in the past). She is willing to (?re-)try Effexor.     ========================================    Brief Hx:      See initial visit 9- for full hx      MRI L-spine (10-6-14):  Minimal disc and facet degenerative change with minimal anterolisthesis of L4 on L5. Other levels normal.  The spinal canal and foramina are widely patent.     MRI C-spine (10-): Pain in both arms: multi-level cervical spondylosis (facet arthropathy, disc bulges, bone spurs, varying degrees of foraminal stenosis, mild-moderate spinal canal stenosis at C3-4, other levels with mild or no spinal canal stenosis, no spinal cord abnormalities).      MRI L-spine (2018) Right leg radicular pain: Transitional lumbosacral segment which will be deemed L5 to be consistent with prior study from . Mild anterolisthesis of L4 and L5 slightly increased since previous study. Minimal central canal narrowing at L4-L5 perhaps subtly increased since prior study. Minimal to mild bilateral foraminal narrowing at L4-L5 slightly greater on the right.     MRI L-spine (2021) Right leg radicular pain: Unchanged grade 1 anterolisthesis of L4 on L5 with minimal spinal stenosis and mild bilateral neural foraminal narrowing. Remaining levels are normal (L3-4 facet joints have mild facet arthropathy).      EMG (2014): No cervical radiculopathy in either upper extremity, normal bilateral median and ulnar motor responses; technical difficulty in obtaining median and ulnar sensory responses. NCS of upper extremities was repeated on 14.     NCS (14): mild CTS bilateral, affecting sensory fibers only; no evidence of ulnar neuropathy in either upper extremity     See New Patient Visit on 18 (re-established care) for full hx     EM18: 1) Right S1 lumbar radiculopathy. 2) mild sensory motor peripheral neuropathy     EM2021: 1) chronic right S1 lumbar radiculopathy 2) No electrodiagnostic evidence of left lumbar radiculopathy, 3) Symmetric sensorimotor peripheral neuropathy, 4) mild left median neuropathy (ie CTS), 5) no evidence of left ulnar neuropathy or left cervical radiculopathy.      ANS testing (2021): ABNORMAL.   1) Reduced sweat production in the proximal leg, distal leg and foot, which can be seen in length dependent polyneuropathy. However medication effect (I.e Lexapro) cannot be excluded. If clinically indicated, patient may come back for repeat QSWEAT testing 7 days off said medication. 2) Cardiovascular autonomic portion is within normal limits with no evidence of orthostatic hypotension or significant tachycardia.     No findings on ANS testing of autonomic neuropathy (was dx as having dysautonomia in the late 1990s by Cardiology based on abnormal tilt-table test at that time).      Does have an idiopathic peripheral neuropathy. As she has not been dx as DM (has pre-DM, reports blood sugars have been very well controlled on Metformin), will check labwork for other causes of peripheral neuropathy (B12, MMA, SPEP, 2 hr GTT).        2-: SPEP normal, B12 336, Methylmalonic acid 357, \"1 hour\" glucose tolerance test (initial glucose 97/ normal, 1 hour glucose 239)     3-4-49020: Repeat Glucose Tolerance Test (truly a 2 hr GTT): initial glucose 97/ normal, 2 hour glucose 190/ abnormal (ref range )      Allergies   Allergen Reactions    Atorvastatin Myalgia    Lactose Diarrhea and Nausea Only    Sulfa (Sulfonamide Antibiotics) Hives         Current Outpatient Medications:     venlafaxine-SR (EFFEXOR-XR) 37.5 mg capsule, Take 1 Capsule by mouth daily. Anti-depressant, used for neuropathy symptoms. , Disp: 30 Capsule, Rfl: 0    fenofibrate nanocrystallized (TRICOR) 145 mg tablet, Take 1 Tablet by mouth daily. , Disp: 90 Tablet, Rfl: 3    losartan (COZAAR) 50 mg tablet, TAKE 1 TABLET DAILY, Disp: 90 Tablet, Rfl: 3    metoprolol succinate (TOPROL-XL) 25 mg XL tablet, Take 1 Tablet by mouth daily. , Disp: 90 Tablet, Rfl: 3    escitalopram oxalate (LEXAPRO) 20 mg tablet, Take 1 Tablet by mouth daily. , Disp: 90 Tablet, Rfl: 2    levothyroxine (SYNTHROID) 112 mcg tablet, Take 1 Tablet by mouth Daily (before breakfast). , Disp: 90 Tablet, Rfl: 2    multivitamin (ONE A DAY) tablet, Take 1 Tablet by mouth daily. , Disp: , Rfl:     cholecalciferol (VITAMIN D3) (2,000 UNITS /50 MCG) cap capsule, Take  by mouth daily. , Disp: , Rfl:     clobetasoL (TEMOVATE) 0.05 % topical cream, , Disp: , Rfl:     aspirin delayed-release 81 mg tablet, Take 81 mg by mouth nightly., Disp: , Rfl:     CYANOCOBALAMIN (VITAMIN B-12 IJ), by Injection route every fourteen (14) days. SQ, Disp: , Rfl:     busPIRone (BUSPAR) 10 mg tablet, Take 10 mg by mouth daily as needed. (Patient not taking: Reported on 3/14/2022), Disp: , Rfl:      has a past medical history of Anxiety, Autonomic dysfunction (12/4/98), Bladder stone, Cancer ([facility] (1997), Diabetes ([facility] [address] Dilated cardiomyopathy ([facility] [address] [facility] (2/8/06), Elevated triglycerides with high cholesterol (11/21/2014), Essential hypertension (2/9/2017), MRSA infection (3/26/2018), Hypothyroidism (1994), Insulin resistance (11/21/2014), Mixed hyperlipidemia with apolipoprotein E2 variant (11/21/2014), Osteoarthritis, Other and unspecified hyperlipidemia (12/21/2010), PONV (postoperative nausea and vomiting), PVC's (1994), Sleep apnea, and Vitamin D deficiency (11/21/2014). has a past surgical history that includes hx wisdom teeth extraction; hx endoscopy (2014); hx colonoscopy (2014); hx colonoscopy (2019); hx colonoscopy (05/13/2021); hx heart catheterization (1994); hx breast lumpectomy (Left, 1997); hx breast reconstruction (Bilateral, 9/28/2015); hx breast augmentation (Bilateral, 12/18/2015); hx breast augmentation (Bilateral, 10/14/2016); hx bilateral mastectomy (Bilateral, 05/2015); hx appendectomy (1969); hx cholecystectomy (2015); hx hernia repair (4/22 16); hx gi; hx polypectomy; hx urological; hx hysterectomy (1986); hx salpingo-oophorectomy (1988); hx orthopaedic (Left, 1988); hx shoulder replacement (Left, 10/2020); pr total knee arthroplasty (Right, 2019); and hx cataract removal (Bilateral, 2008).       Physical Exam:    Vitals:    04/26/22 1431   BP: 122/72   Pulse: 88   Resp: 16   Height: 5' 4\" (1.626 m)   Weight: 84.8 kg (187 lb)   SpO2: 97%     Entirety of visit spent discussing symptoms, diagnostic treatment options      ========================================    ASSESSMENT/ PLAN:       ICD-10-CM ICD-9-CM    1. Acute left lumbar radiculopathy  M54.16 724.4    2. Sensorimotor neuropathy  G62.9 356.9 venlafaxine-SR (EFFEXOR-XR) 37.5 mg capsule        Discussed with patient and provided the following information in the after-visit summary:     We discussed the pain you're having in your left lower back, radiating down your left leg sounds like a pinched nerve in back (most likely involving the L5 nerve root). I recommend you ask your Ortho-Knee specialist to refer you to one of his/ her colleagues who specializes in Spine (ie Ortho-Spine) to discuss back injection for this problem. For your persistent peripheral Neuropathy symptoms, you wanted to try Effexor, and antidepressant, that might help with neuropathy symptoms. I'm starting you on the small dose, 37.5 mg once a day. Send me a [employer] message in 3 weeks letting me know if this his helping or not. If it's not helping, then I would increase the dose to 75 mg/ day at that point. Follow up here in 6 weeks to reassess the peripheral neuropathy      An electronic signature was used to authenticate this note.   -- William Gomes MD       ADDENDUM: diagnosis code was changed to reflect that patients ongoing radiculopathy symptoms involve the LEFT lower extremity not the right

## 2022-04-27 ENCOUNTER — TELEPHONE (OUTPATIENT)
Dept: NEUROLOGY | Age: 72
End: 2022-04-27

## 2022-04-28 ENCOUNTER — TELEPHONE (OUTPATIENT)
Dept: NEUROLOGY | Age: 72
End: 2022-04-28

## 2022-04-28 DIAGNOSIS — G62.9 SENSORIMOTOR NEUROPATHY: Primary | ICD-10-CM

## 2022-04-28 NOTE — TELEPHONE ENCOUNTER
Patient called to get refill on effexor. It needs to be sent to the [facility] pharmacy.

## 2022-04-29 ENCOUNTER — PATIENT MESSAGE (OUTPATIENT)
Dept: NEUROLOGY | Age: 72
End: 2022-04-29

## 2022-04-29 RX ORDER — VENLAFAXINE HYDROCHLORIDE 37.5 MG/1
37.5 CAPSULE, EXTENDED RELEASE ORAL DAILY
Qty: 30 CAPSULE | Refills: 0 | Status: SHIPPED | OUTPATIENT
Start: 2022-04-29 | End: 2022-05-04 | Stop reason: SDUPTHER

## 2022-04-29 NOTE — TELEPHONE ENCOUNTER
Pt called to check status of her new prescription of Effexor. Please [facility] [address] And give patient a call when RX is sent. She is anxious to try this new medication.

## 2022-05-04 ENCOUNTER — TELEPHONE (OUTPATIENT)
Dept: FAMILY MEDICINE CLINIC | Age: 72
End: 2022-05-04

## 2022-05-04 NOTE — TELEPHONE ENCOUNTER
Dinesh Jaimes from pharmacy stated [facility] stated that the pt needs a refill for Atorvastatin 20 mg. Reference #[id number].  Jordyn Whipple @ [phone]

## 2022-05-13 ENCOUNTER — TELEPHONE (OUTPATIENT)
Dept: FAMILY MEDICINE CLINIC | Age: 72
End: 2022-05-13

## 2022-05-13 NOTE — TELEPHONE ENCOUNTER
Patient states she is not on that medication and if they call for trazodone, she does not need that either.

## 2022-05-13 NOTE — TELEPHONE ENCOUNTER
That was discontinued last year for myalgia. She was started on Livalo. Looks like Livalo was discontinued at a preop visit 3/9/2022. Please call her to clarify what she is taking and is she taking it daily.

## 2022-06-16 ENCOUNTER — PATIENT MESSAGE (OUTPATIENT)
Dept: FAMILY MEDICINE CLINIC | Age: 72
End: 2022-06-16

## 2022-06-16 DIAGNOSIS — E53.8 B12 DEFICIENCY: Primary | ICD-10-CM

## 2022-06-16 RX ORDER — CYANOCOBALAMIN 1000 UG/ML
1000 INJECTION, SOLUTION INTRAMUSCULAR; SUBCUTANEOUS
Qty: 6 ML | Refills: 0
Start: 2022-06-16 | End: 2022-07-05 | Stop reason: SDUPTHER

## 2022-06-16 NOTE — TELEPHONE ENCOUNTER
From: Percy Cisneros  To: Víctor Bennett NP  Sent: 6/16/2022 1:59 PM EDT  Subject: Refill     I need refill on my b12 thank you

## 2022-07-05 ENCOUNTER — PATIENT MESSAGE (OUTPATIENT)
Dept: FAMILY MEDICINE CLINIC | Age: 72
End: 2022-07-05

## 2022-07-05 DIAGNOSIS — E53.8 B12 DEFICIENCY: ICD-10-CM

## 2022-07-05 RX ORDER — CYANOCOBALAMIN 1000 UG/ML
1000 INJECTION, SOLUTION INTRAMUSCULAR; SUBCUTANEOUS
Qty: 6 ML | Refills: 0
Start: 2022-07-05 | End: 2022-07-05 | Stop reason: SDUPTHER

## 2022-07-05 RX ORDER — CYANOCOBALAMIN 1000 UG/ML
1000 INJECTION, SOLUTION INTRAMUSCULAR; SUBCUTANEOUS
Qty: 6 ML | Refills: 0
Start: 2022-07-05 | End: 2022-07-06

## 2022-07-05 NOTE — TELEPHONE ENCOUNTER
From: Percy Cisneros  To: Varsha Ford NP  Sent: 7/5/2022 11:05 AM EDT  Subject: Store    [facility] in [place]

## 2022-07-05 NOTE — TELEPHONE ENCOUNTER
Please call [facility] to fill this RX it was sent to [facility] mail order on 6/16 but pt said she has spoke with them twice now and they have no record of it . She said she really needs this so she wanted it sent to local [facility] .  Please call pt when order has been placed thanks !!

## 2022-10-06 ENCOUNTER — OFFICE VISIT (OUTPATIENT)
Dept: CARDIOLOGY CLINIC | Age: 72
End: 2022-10-06
Payer: MEDICARE

## 2022-10-06 VITALS
HEIGHT: 64 IN | SYSTOLIC BLOOD PRESSURE: 130 MMHG | HEART RATE: 80 BPM | OXYGEN SATURATION: 97 % | RESPIRATION RATE: 16 BRPM | BODY MASS INDEX: 32.68 KG/M2 | DIASTOLIC BLOOD PRESSURE: 82 MMHG | WEIGHT: 191.4 LBS

## 2022-10-06 DIAGNOSIS — I42.8 NICM (NONISCHEMIC CARDIOMYOPATHY) (HCC): ICD-10-CM

## 2022-10-06 DIAGNOSIS — I10 ESSENTIAL HYPERTENSION: Primary | ICD-10-CM

## 2022-10-06 DIAGNOSIS — E78.5 DYSLIPIDEMIA: ICD-10-CM

## 2022-10-06 PROCEDURE — 99214 OFFICE O/P EST MOD 30 MIN: CPT | Performed by: NURSE PRACTITIONER

## 2022-10-06 PROCEDURE — G8432 DEP SCR NOT DOC, RNG: HCPCS | Performed by: NURSE PRACTITIONER

## 2022-10-06 PROCEDURE — 1090F PRES/ABSN URINE INCON ASSESS: CPT | Performed by: NURSE PRACTITIONER

## 2022-10-06 PROCEDURE — G8417 CALC BMI ABV UP PARAM F/U: HCPCS | Performed by: NURSE PRACTITIONER

## 2022-10-06 PROCEDURE — G0463 HOSPITAL OUTPT CLINIC VISIT: HCPCS | Performed by: NURSE PRACTITIONER

## 2022-10-06 PROCEDURE — G8427 DOCREV CUR MEDS BY ELIG CLIN: HCPCS | Performed by: NURSE PRACTITIONER

## 2022-10-06 PROCEDURE — G8536 NO DOC ELDER MAL SCRN: HCPCS | Performed by: NURSE PRACTITIONER

## 2022-10-06 PROCEDURE — 1101F PT FALLS ASSESS-DOCD LE1/YR: CPT | Performed by: NURSE PRACTITIONER

## 2022-10-06 PROCEDURE — G8754 DIAS BP LESS 90: HCPCS | Performed by: NURSE PRACTITIONER

## 2022-10-06 PROCEDURE — 1123F ACP DISCUSS/DSCN MKR DOCD: CPT | Performed by: NURSE PRACTITIONER

## 2022-10-06 PROCEDURE — 3017F COLORECTAL CA SCREEN DOC REV: CPT | Performed by: NURSE PRACTITIONER

## 2022-10-06 PROCEDURE — G8752 SYS BP LESS 140: HCPCS | Performed by: NURSE PRACTITIONER

## 2022-10-06 RX ORDER — ALIROCUMAB 150 MG/ML
150 INJECTION, SOLUTION SUBCUTANEOUS EVERY 2 WEEKS
COMMUNITY
Start: 2022-03-07

## 2022-10-06 NOTE — LETTER
10/6/2022    Patient: Mehran Kasper   YOB: 1950   Date of Visit: 10/6/2022     Malorie Thomas NP  [address]  [place] [address] [address]  [address]  [address] [place] [address]  Tony Jenniffer    Dear LUIS Villalobos MD,      Thank you for referring Ms. Percy Cisneros to [facility] for evaluation. My notes for this consultation are attached. If you have questions, please do not hesitate to call me. I look forward to following your patient along with you.       Sincerely,    Romina Forman NP

## 2022-10-06 NOTE — PROGRESS NOTES
Room EP 3    Chief Complaint   Patient presents with    Other     PVC/Non sustained VT    Cardiomyopathy    Follow-up     Visit Vitals  /82 (BP 1 Location: Right upper arm, BP Patient Position: Sitting, BP Cuff Size: Adult)   Pulse 80   Resp 16   Ht 5' 4\" (1.626 m)   Wt 191 lb 6.4 oz (86.8 kg)   SpO2 97%   BMI 32.85 kg/m²         COVID + in July went to [facility]     Chest pain: no    Shortness of breath: no    Edema: no    Palpitations, Skipped beats, Rapid heartbeat: yes, \"once in awhile\"    Dizziness: no    Fatigue:no    New diagnosis/Surgeries: no    1. Have you been to the ER, urgent care clinic since your last visit? Hospitalized since your last visit? No      2. Have you seen or consulted any other health care providers outside of the [address] [place] [address] since your last visit? Include any pap smears or colon screening.  Dr. Gonzales Whitehead, labs done in August    Refills: no

## 2022-10-06 NOTE — PROGRESS NOTES
aHi Cornejo, [facility]  [id number],  [address]  [place], [address] [facility]    Office [phone]  Fax [phone]      Assessment:  Hx of PVC/nonsustained VT - was on Mexiletine (now dc'd)  E cardio event monitor after stopping mexiletine (2018)-sinus rhythm/1 PVC/no significant arrhythmias  NICM -LVEF improved, most recently 55% in Feb  Hx of orthostasis/dizziness - currently aysmpotmatic  HTN  HLD    Plan:   Doing well on current meds - no c/o   Vol status euvolemic   Echo (limited) 2/17/22 with nml LVEF, nml wall motion   On low dose ARB and BB - BP normotensive   Lipids followed by Endo - at goal with LDL of 46 in June - on praluent therapy along with fenofibrate,     Aggressive cardiovascular risk factor modification. Working on weight loss / increased exercise     Follow-up in 6 months or earlier as needed    Patient understands the plan. All questions were answered to the patient's satisfaction. Medication Side Effects and Warnings were discussed with patient: yes  Patient Labs were reviewed and or requested:  yes  Patient Past Records were reviewed and or requested: yes    Cardiac Testing/ Procedures: A. Cardiac Cath/PCI:    B.ECHO/ISAAC:    3/12/20 -EF 55-60%/Grade 1 DD/Trace MR  11/8/17 - EF 55-60%/Grade 1 DD  10/5/16-EF 87-40%, grade 1 diastolic dysfunction, mild TR  11/21/14Left ventricle: Systolic function was normal. Ejection fraction was  estimated to be 55 %. There were no regional wall motion abnormalities. Doppler parameters were consistent with abnormal left ventricular  relaxation (grade 1 diastolic dysfunction). Left atrium: The atrium was mildly dilated. Mitral valve: There was mild regurgitation. Tricuspid valve: There was mild regurgitation. 10/2013 - EF 55%    7/2007 - EF 45%    C. StressNuclear/Stress ECHO/Stress test: Nm Stress ECHO 9/29/20 - 4.1 min  11/8/17 - Treadmill test - 4.39 min/nml  04/99 - Persantine cardiolite - EF 48%/No inducible ischemia      D. Vascular: 3/12/20 - Nml ERIC bilat    E. EP: Hx on nonsustained VT/PVC- was on mexilitine  12/4/98 - tilt test with marked heart rate variablility and drop in BP without hemodynamic collapse    8/9/16 - Holter - One blocked P wave ( 4.50 AM) ; no PV; SR  2/11/18 to 3/12/18E  cardio event monitor-sinus rhythm/one PVC    F. Miscellaneous:    Subjective:  Here for routine follow-up. Doing well and without c/o. Patient denies any exertional chest pain, dyspnea, palpitations, syncope, sig edema, or paroxysmal nocturnal dyspnea. Doing well post knee replacement  - more active and walking regularly. ROS:  (Positive findings above)  Constitutional: Negative for fever,    Respiratory: Negative for cough, hemoptysis, sputum production, and wheezing. Cardiovascular: Negative for chest pain, palpitations, leg swelling and PND. Gastrointestinal: Negative for blood in stool and melena. Genitourinary: Negative for dysuria and flank pain. Neurological: Negative for seizures, loss of consciousness  Endo/Heme/Allergies: Negative for abnormal bleeding. Psychiatric/Behavioral: Negative for memory loss. Medications before admission:    Current Outpatient Medications   Medication Sig Dispense    alirocumab (Praluent Pen) 150 mg/mL injector pen 150 mg by SubCUTAneous route Once every 2 weeks. fenofibrate nanocrystallized (TRICOR) 145 mg tablet Take 1 Tablet by mouth daily. 90 Tablet    metoprolol succinate (TOPROL-XL) 25 mg XL tablet Take 1 Tablet by mouth daily. 90 Tablet    escitalopram oxalate (LEXAPRO) 20 mg tablet Take 1 Tablet by mouth daily. 90 Tablet    levothyroxine (SYNTHROID) 112 mcg tablet Take 1 Tablet by mouth Daily (before breakfast). 90 Tablet    cyanocobalamin (VITAMIN B12) 1,000 mcg/mL injection ADMINISTER 1 ML IN THE MUSCLE EVERY 2 WEEKS 6 mL    losartan (COZAAR) 50 mg tablet TAKE 1 TABLET DAILY 90 Tablet    multivitamin (ONE A DAY) tablet Take 1 Tablet by mouth daily. cholecalciferol (VITAMIN D3) (2,000 UNITS /50 MCG) cap capsule Take  by mouth daily. busPIRone (BUSPAR) 10 mg tablet Take 10 mg by mouth daily as needed. clobetasoL (TEMOVATE) 0.05 % topical cream      aspirin delayed-release 81 mg tablet Take 81 mg by mouth nightly. No current facility-administered medications for this visit. Physical Exam:  Visit Vitals  /82 (BP 1 Location: Right upper arm, BP Patient Position: Sitting, BP Cuff Size: Adult)   Pulse 80   Resp 16   Ht 5' 4\" (1.626 m)   Wt 191 lb 6.4 oz (86.8 kg)   SpO2 97%   BMI 32.85 kg/m²        General - well developed well nourished  Neck - neck supple, no JVD  Cardiac - normal S1, S2, RRR, no murmurs, rubs or gallops. No clicks  Vascular - carotids without bruits, radials pulses equal bilateral  Lungs - clear to auscultation bilaterals, no rales, wheezing or rhonchi  Abd - soft nontender, non-distended, +BS  Extremities - no edema, warm  Neuro - nonfocal  Psych - normal mood and affect      LABS:  Labs 6/23/33 reviewed (from [facility])   , HDL 41, LDL 46,     Lab Results   Component Value Date/Time    Sodium 140 03/15/2022 03:22 AM    Potassium 4.1 03/15/2022 03:22 AM    Chloride 114 (H) 03/15/2022 03:22 AM    CO2 21 03/15/2022 03:22 AM    Anion gap 5 03/15/2022 03:22 AM    Glucose 116 (H) 03/15/2022 03:22 AM    Glucose 97 03/04/2021 10:32 AM    BUN 14 03/15/2022 03:22 AM    Creatinine 0.65 03/15/2022 03:22 AM    BUN/Creatinine ratio 22 (H) 03/15/2022 03:22 AM    GFR est AA >60 03/15/2022 03:22 AM    GFR est non-AA >60 03/15/2022 03:22 AM    Calcium 8.8 03/15/2022 03:22 AM    Bilirubin, total <0.2 12/07/2021 03:09 PM    Alk.  phosphatase 39 (L) 12/07/2021 03:09 PM    Protein, total 7.6 12/07/2021 03:09 PM    Albumin 4.9 (H) 12/07/2021 03:09 PM    Globulin 3.3 09/14/2015 12:06 PM    A-G Ratio 1.8 12/07/2021 03:09 PM    ALT (SGPT) 17 12/07/2021 03:09 PM    AST (SGOT) 20 12/07/2021 03:09 PM     Lab Results   Component Value Date/Time    WBC 8.0 03/09/2022 09:41 AM    HGB 9.9 (L) 03/15/2022 03:22 AM    HCT 36.7 03/09/2022 09:41 AM    PLATELET 131 51/35/5583 09:41 AM    MCV 89.7 03/09/2022 09:41 AM     Lab Results   Component Value Date/Time    Cholesterol, total 214 (H) 12/07/2021 03:09 PM    HDL Cholesterol 36 (L) 12/07/2021 03:09 PM    LDL, calculated 117 (H) 12/07/2021 03:09 PM    LDL, calculated 102 (H) 12/04/2018 09:37 AM    VLDL, calculated 61 (H) 12/07/2021 03:09 PM    VLDL, calculated 36 12/04/2018 09:37 AM    Triglyceride 347 (H) 12/07/2021 03:09 PM     Lab Results   Component Value Date/Time    TSH 3.970 12/07/2021 03:09 PM    T4, Free 1.55 03/09/2016 09:17 AM     Lab Results   Component Value Date/Time    Hemoglobin A1c 5.8 (H) 03/09/2022 09:41 AM     No results found for: CPK, RCK1, RCK2, RCK3, RCK4, CKMB, CKNDX, CKND1, TROPT, TROIQ, BNPP, BNP    Gris Farmer, NP

## 2022-11-02 DIAGNOSIS — E53.8 B12 DEFICIENCY: ICD-10-CM

## 2022-11-02 RX ORDER — CYANOCOBALAMIN 1000 UG/ML
INJECTION, SOLUTION INTRAMUSCULAR; SUBCUTANEOUS
Qty: 6 ML | Refills: 0 | Status: SHIPPED | OUTPATIENT
Start: 2022-11-02 | End: 2022-11-16

## 2022-11-15 DIAGNOSIS — E53.8 B12 DEFICIENCY: ICD-10-CM

## 2022-11-16 RX ORDER — CYANOCOBALAMIN 1000 UG/ML
INJECTION, SOLUTION INTRAMUSCULAR; SUBCUTANEOUS
Qty: 6 ML | Refills: 0 | Status: SHIPPED | OUTPATIENT
Start: 2022-11-16

## 2022-12-08 ENCOUNTER — OFFICE VISIT (OUTPATIENT)
Dept: FAMILY MEDICINE CLINIC | Age: 72
End: 2022-12-08
Payer: MEDICARE

## 2022-12-08 VITALS
HEART RATE: 71 BPM | WEIGHT: 181 LBS | HEIGHT: 64 IN | OXYGEN SATURATION: 96 % | TEMPERATURE: 97.8 F | SYSTOLIC BLOOD PRESSURE: 130 MMHG | DIASTOLIC BLOOD PRESSURE: 84 MMHG | RESPIRATION RATE: 16 BRPM | BODY MASS INDEX: 30.9 KG/M2

## 2022-12-08 DIAGNOSIS — H91.90 HEARING LOSS, UNSPECIFIED HEARING LOSS TYPE, UNSPECIFIED LATERALITY: ICD-10-CM

## 2022-12-08 DIAGNOSIS — Z78.0 ASYMPTOMATIC POSTMENOPAUSAL STATE: ICD-10-CM

## 2022-12-08 DIAGNOSIS — Z13.31 DEPRESSION SCREENING: ICD-10-CM

## 2022-12-08 DIAGNOSIS — Z71.89 ADVANCED DIRECTIVES, COUNSELING/DISCUSSION: ICD-10-CM

## 2022-12-08 DIAGNOSIS — E66.09 CLASS 1 OBESITY DUE TO EXCESS CALORIES WITH SERIOUS COMORBIDITY AND BODY MASS INDEX (BMI) OF 31.0 TO 31.9 IN ADULT: ICD-10-CM

## 2022-12-08 DIAGNOSIS — M81.0 AGE-RELATED OSTEOPOROSIS WITHOUT CURRENT PATHOLOGICAL FRACTURE: ICD-10-CM

## 2022-12-08 DIAGNOSIS — F51.01 PRIMARY INSOMNIA: ICD-10-CM

## 2022-12-08 DIAGNOSIS — Z00.00 MEDICARE ANNUAL WELLNESS VISIT, SUBSEQUENT: Primary | ICD-10-CM

## 2022-12-08 DIAGNOSIS — F41.1 GENERALIZED ANXIETY DISORDER: ICD-10-CM

## 2022-12-08 DIAGNOSIS — Z91.81 AT LOW RISK FOR FALL: ICD-10-CM

## 2022-12-08 DIAGNOSIS — R73.01 IMPAIRED FASTING GLUCOSE: ICD-10-CM

## 2022-12-08 DIAGNOSIS — Z28.20 VACCINE REFUSED BY PATIENT: ICD-10-CM

## 2022-12-08 DIAGNOSIS — E53.8 B12 DEFICIENCY: ICD-10-CM

## 2022-12-08 DIAGNOSIS — Z13.39 ALCOHOL SCREENING: ICD-10-CM

## 2022-12-08 DIAGNOSIS — E03.8 HYPOTHYROIDISM, SECONDARY: ICD-10-CM

## 2022-12-08 DIAGNOSIS — E78.2 MIXED HYPERLIPIDEMIA WITH APOLIPOPROTEIN E2 VARIANT: ICD-10-CM

## 2022-12-08 RX ORDER — SERTRALINE HYDROCHLORIDE 100 MG/1
100 TABLET, FILM COATED ORAL DAILY
Qty: 90 TABLET | Refills: 0 | Status: SHIPPED | OUTPATIENT
Start: 2022-12-08

## 2022-12-08 RX ORDER — METFORMIN HYDROCHLORIDE 500 MG/1
500 TABLET ORAL 2 TIMES DAILY WITH MEALS
COMMUNITY

## 2022-12-08 RX ORDER — TRAZODONE HYDROCHLORIDE 50 MG/1
50 TABLET ORAL
COMMUNITY

## 2022-12-08 NOTE — PROGRESS NOTES
No chief complaint on file. 1. \"Have you been to the ER, urgent care clinic since your last visit? Hospitalized since your last visit? \" No    2. \"Have you seen or consulted any other health care providers outside of the [address] [place] [address] since your last visit? \" No     3. For patients aged 39-70: Has the patient had a colonoscopy / FIT/ Cologuard? Yes - no Care Gap present      If the patient is female:    4. For patients aged 41-77: Has the patient had a mammogram within the past 2 years? Yes - no Care Gap present      5. For patients aged 21-65: Has the patient had a pap smear?  Yes - no Care Gap present Visit Vitals  /84 (BP 1 Location: Right upper arm, BP Patient Position: Sitting, BP Cuff Size: Adult)   Pulse 71   Temp 97.8 °F (36.6 °C) (Temporal)   Resp 16   Ht 5' 4\" (1.626 m)   Wt 181 lb (82.1 kg)   SpO2 96%   BMI 31.07 kg/m²      3 most recent PHQ Screens 12/8/2022   Little interest or pleasure in doing things Not at all   Feeling down, depressed, irritable, or hopeless Not at all   Total Score PHQ 2 0

## 2022-12-08 NOTE — PROGRESS NOTES
Medicare Wellness Exam:    Chief Complaint   Patient presents with    Annual Wellness Visit       she is a 67y.o. year old female who presents for evaluation for their Medicare Wellness Visit. Patient presents for Medicare wellness, and management of anxiety, osteoporosis, B12 deficiency, and insomnia. Follows with cardiology for management of HTN and HLD. Lipids are checked regularly by cardiology- Praluent, Losartan, Metoprolol, and Tricor. Follows with endocrinology for management of hypothyroidism and prediabetes. Restarted Metformin for elevated A1c- Levothyroxine and Metformin. Fall Screen is completed and assessed=yes  Depression Screen is completed and assessed=yes  Medication list reviewed and adjusted for accuracy=yes  Immunizations reviewed and updated=yes  Health/Preventative Screenings reviewed and updated=yes  ADL Functions reviewed=yes  MiniCog score= 5/5 (2points for clock, 3/3 for recall)  See scanned medicare wellness documents for full details. Patient Active Problem List    Diagnosis    Obesity    Generalized anxiety disorder    B12 deficiency    Insomnia    Osteoarthritis of left knee    Impaired fasting glucose    Age-related osteoporosis without current pathological fracture    Arthritis of right knee    Hx MRSA infection     L GROIN      Lumbar radiculopathy, right    Sensorimotor neuropathy    Radicular syndrome of right leg    Essential hypertension    Hx of cardiomyopathy    Fibromyalgia    Spinal stenosis of cervical region    Facet arthropathy, cervical    Lumbar facet arthropathy    Mixed hyperlipidemia with apolipoprotein E2 variant    Insulin resistance    Vitamin D deficiency    Elevated triglycerides with high cholesterol    PVC (premature ventricular contraction)    Hypothyroidism, secondary       Reviewed PmHx, RxHx, FmHx, SocHx, AllgHx and updated and dated in the chart. Review of Systems   Constitutional:  Negative for chills, fever and weight loss.    HENT: Positive for hearing loss. Denies difficulty swallowing. Eyes:  Negative for blurred vision. Respiratory:  Negative for cough, shortness of breath and wheezing. Cardiovascular:  Negative for chest pain, palpitations and leg swelling. Gastrointestinal:  Positive for constipation. Negative for abdominal pain, diarrhea and heartburn. Musculoskeletal:  Positive for joint pain. Negative for myalgias. Neurological:  Positive for tingling (bilateral feet neuropathy). Negative for dizziness, weakness and headaches. Psychiatric/Behavioral:  Negative for depression. The patient is nervous/anxious. Objective:     Vitals:    12/08/22 0753   BP: 130/84   Pulse: 71   Resp: 16   Temp: 97.8 °F (36.6 °C)   TempSrc: Temporal   SpO2: 96%   Weight: 181 lb (82.1 kg)   Height: 5' 4\" (1.626 m)     Physical Exam  Vitals and nursing note reviewed. Constitutional:       General: She is not in acute distress. Appearance: Normal appearance. She is obese. HENT:      Head: Normocephalic. Eyes:      Extraocular Movements: Extraocular movements intact. Neck:      Thyroid: No thyroid mass, thyromegaly or thyroid tenderness. Cardiovascular:      Rate and Rhythm: Normal rate and regular rhythm. Heart sounds: Normal heart sounds. Pulmonary:      Effort: Pulmonary effort is normal.      Breath sounds: Normal breath sounds. Musculoskeletal:         General: Normal range of motion. Cervical back: Neck supple. Right lower leg: No edema. Left lower leg: No edema. Lymphadenopathy:      Cervical: No cervical adenopathy. Upper Body:      Right upper body: No supraclavicular adenopathy. Left upper body: No supraclavicular adenopathy. Skin:     General: Skin is warm and dry. Neurological:      Mental Status: She is alert and oriented to person, place, and time.    Psychiatric:         Mood and Affect: Mood normal.         Behavior: Behavior normal.        Assessment/ Plan:   Diagnoses and all orders for this visit:    1. Medicare annual wellness visit, subsequent  [facility] exam completed as documented. 2. Depression screening  Negative depression screening. 3. Alcohol screening  Low risk for alcohol misuse. 4. At low risk for fall  Low fall risk. 5. Advanced directives, counseling/discussion  Discussed the purpose and importance of advanced directives and encouraged to complete at his earliest convenience. Once complete, provide a copy to us for medical record. 6. Class 1 obesity due to excess calories with serious comorbidity and body mass index (BMI) of 31.0 to 31.9 in adult  Weight is down approximately 6 pounds since previous visit. Continue to exercise regularly and eat a healthy diet. 7. Vaccine refused by patient  Declines COVID boosters. Understands risks. 8. Asymptomatic postmenopausal state  -     DEXA BONE DENSITY STUDY AXIAL; Future    9. Hearing loss, unspecified hearing loss type, unspecified laterality  -     REFERRAL TO ENT-OTOLARYNGOLOGY    10. Generalized anxiety disorder  Anxiety is not well controlled with Lexapro daily. Stop Lexapro and start sertraline as ordered with next dose. Advised it may take several weeks to notice improvement. Continue BuSpar as needed. -     sertraline (ZOLOFT) 100 mg tablet; Take 1 Tablet by mouth daily. 11. Age-related osteoporosis without current pathological fracture  Does not recall taking biphosphonate's in the past.  Repeat bone density scan due and ordered today. 12. B12 deficiency  Continues to self inject every 2 weeks. Checking annual labs today. -     CBC WITH AUTOMATED DIFF  -     VITAMIN B12    13. Primary insomnia  Well-controlled with trazodone as needed. Typically 1 night per week on average. 14. Mixed hyperlipidemia with apolipoprotein E2 variant  Follows with cardiology for management.   Total 133, HDL 41, LDL 46, triglycerides 227 when checked by endocrinology 6/23/2022.  - CBC WITH AUTOMATED DIFF    15. Hypothyroidism, secondary  Follows with endocrinology for management. Thyroid function normal when checked by endocrinology 6/23/2022. 16. Impaired fasting glucose  Follows with endocrinology for management. A1c 5.8% when checked 6/23/2022.    -Pain evaluation performed in office  -Cognitive Screen performed in office  -Depression Screen, Fall risks (by up and go test)  and ADL functionality were addressed  -Medication list updated and reviewed for any changes   -A comprehensive review of medical issues and a plan was formulated  -End of life planning was addressed with pt   -Health Screenings for preventions were addressed and a plan was formulated  -Shingles Vaccine was recommended  -Discussed with patient cancer risk factors and appropriate screenings for age  -Patient evaluated for colonoscopy and referred if needed per screeing criteria  -Labs from previous visits were discussed with patient   -Discussed with patient diet and exercise and formulated a plan as needed  -An Advanced care plan was developed with the patient.  -Alcohol screening performed and was negative    -  Follow-up and Dispositions    Return in about 6 weeks (around 1/19/2023) for follow up, anxiety, VV ok. I have discussed the diagnosis with the patient and the intended plan as seen in the above orders. The patient understands and agrees with the plan. The patient has received an after-visit summary and questions were answered concerning future plans. Medication Side Effects and Warnings were discussed with patient  Patient Labs were reviewed and or requested  Patient Past Records were reviewed and or requested    Aspects of this note may have been generated using voice recognition software. Despite editing, there may be some syntax errors.     Malorie KONG

## 2022-12-09 LAB
BASOPHILS # BLD AUTO: 0.1 X10E3/UL (ref 0–0.2)
BASOPHILS NFR BLD AUTO: 1 %
EOSINOPHIL # BLD AUTO: 0.3 X10E3/UL (ref 0–0.4)
EOSINOPHIL NFR BLD AUTO: 4 %
ERYTHROCYTE [DISTWIDTH] IN BLOOD BY AUTOMATED COUNT: 13.8 % (ref 11.7–15.4)
HCT VFR BLD AUTO: 37.6 % (ref 34–46.6)
HGB BLD-MCNC: 12 G/DL (ref 11.1–15.9)
IMM GRANULOCYTES # BLD AUTO: 0 X10E3/UL (ref 0–0.1)
IMM GRANULOCYTES NFR BLD AUTO: 0 %
LYMPHOCYTES # BLD AUTO: 1.4 X10E3/UL (ref 0.7–3.1)
LYMPHOCYTES NFR BLD AUTO: 22 %
MCH RBC QN AUTO: 27.8 PG (ref 26.6–33)
MCHC RBC AUTO-ENTMCNC: 31.9 G/DL (ref 31.5–35.7)
MCV RBC AUTO: 87 FL (ref 79–97)
MONOCYTES # BLD AUTO: 0.5 X10E3/UL (ref 0.1–0.9)
MONOCYTES NFR BLD AUTO: 8 %
NEUTROPHILS # BLD AUTO: 4.4 X10E3/UL (ref 1.4–7)
NEUTROPHILS NFR BLD AUTO: 65 %
PLATELET # BLD AUTO: 312 X10E3/UL (ref 150–450)
RBC # BLD AUTO: 4.32 X10E6/UL (ref 3.77–5.28)
VIT B12 SERPL-MCNC: 357 PG/ML (ref 232–1245)
WBC # BLD AUTO: 6.7 X10E3/UL (ref 3.4–10.8)

## 2023-01-04 ENCOUNTER — HOSPITAL ENCOUNTER (OUTPATIENT)
Dept: MAMMOGRAPHY | Age: 73
Discharge: HOME | End: 2023-01-04
Payer: MEDICARE

## 2023-01-04 DIAGNOSIS — Z78.0 ASYMPTOMATIC POSTMENOPAUSAL STATE: ICD-10-CM

## 2023-01-04 PROCEDURE — 77080 DXA BONE DENSITY AXIAL: CPT

## 2023-01-05 ENCOUNTER — TELEPHONE (OUTPATIENT)
Dept: FAMILY MEDICINE CLINIC | Age: 73
End: 2023-01-05

## 2023-01-05 DIAGNOSIS — F41.1 GENERALIZED ANXIETY DISORDER: Primary | ICD-10-CM

## 2023-01-05 RX ORDER — ESCITALOPRAM OXALATE 20 MG/1
20 TABLET ORAL DAILY
Qty: 90 TABLET | Refills: 3 | Status: SHIPPED | OUTPATIENT
Start: 2023-01-05

## 2023-01-05 NOTE — TELEPHONE ENCOUNTER
Patient advised. Patient was taking lexapro 20mg. Patient needs a refill sent to the [place] [address].

## 2023-01-05 NOTE — TELEPHONE ENCOUNTER
----- Message from Everton Barillas sent at 1/5/2023 11:12 AM EST -----  Subject: Message to Provider    QUESTIONS  Information for Provider? PT WAS SEEN ON [id number] AND WAS GIVEN A NEW   ANTI DEPRESSION MEDICATION ,PTS ANXIETY IS REALLY BAD AND PT NEEDS TO KNOW   IF SHE CAN GO BACK TO [facility] ,PT WAS GIVEN [facility] ,PT STATES [employer] IS   NOT WORKING FOR HER ,PLEASE CALL ASAP WITH INFORMATION  ---------------------------------------------------------------------------  --------------  Lilian Neal [facility]  [phone]; OK to leave message on voicemail  ---------------------------------------------------------------------------  --------------  SCRIPT ANSWERS  Relationship to Patient?  Self

## 2023-01-13 DIAGNOSIS — E78.2 ELEVATED TRIGLYCERIDES WITH HIGH CHOLESTEROL: ICD-10-CM

## 2023-01-13 DIAGNOSIS — E78.2 MIXED HYPERLIPIDEMIA WITH APOLIPOPROTEIN E2 VARIANT: Primary | ICD-10-CM

## 2023-01-16 DIAGNOSIS — F41.1 GENERALIZED ANXIETY DISORDER: ICD-10-CM

## 2023-01-17 RX ORDER — ESCITALOPRAM OXALATE 20 MG/1
20 TABLET ORAL DAILY
Qty: 90 TABLET | Refills: 3 | Status: SHIPPED | OUTPATIENT
Start: 2023-01-17

## 2023-01-17 RX ORDER — LEVOTHYROXINE SODIUM 112 UG/1
112 TABLET ORAL
Qty: 90 TABLET | Refills: 0 | Status: SHIPPED | OUTPATIENT
Start: 2023-01-17

## 2023-01-23 ENCOUNTER — DOCUMENTATION ONLY (OUTPATIENT)
Dept: CARDIOLOGY CLINIC | Age: 73
End: 2023-01-23

## 2023-01-23 NOTE — PROGRESS NOTES
Documentation and recent lipid profile faxed to [facility] for [place] prior authorization. Faxed to [phone]. Confirmation received.

## 2023-01-25 ENCOUNTER — DOCUMENTATION ONLY (OUTPATIENT)
Dept: CARDIOLOGY CLINIC | Age: 73
End: 2023-01-25

## 2023-01-25 NOTE — PROGRESS NOTES
[facility]   Member ID [id number]  Authorization [id number]    Brit approved for 12/24/22 until further notice.     Member Services [phone]

## 2023-02-16 ENCOUNTER — OFFICE VISIT (OUTPATIENT)
Dept: ENT CLINIC | Age: 73
End: 2023-02-16

## 2023-02-16 ENCOUNTER — OFFICE VISIT (OUTPATIENT)
Dept: ENT CLINIC | Age: 73
End: 2023-02-16
Payer: MEDICARE

## 2023-02-16 VITALS
RESPIRATION RATE: 19 BRPM | BODY MASS INDEX: 33.31 KG/M2 | OXYGEN SATURATION: 98 % | DIASTOLIC BLOOD PRESSURE: 70 MMHG | HEIGHT: 62 IN | WEIGHT: 181 LBS | HEART RATE: 65 BPM | SYSTOLIC BLOOD PRESSURE: 118 MMHG

## 2023-02-16 DIAGNOSIS — H90.3 SENSORINEURAL HEARING LOSS (SNHL) OF BOTH EARS: Primary | ICD-10-CM

## 2023-02-16 PROCEDURE — 99202 OFFICE O/P NEW SF 15 MIN: CPT | Performed by: NURSE PRACTITIONER

## 2023-02-16 PROCEDURE — 3074F SYST BP LT 130 MM HG: CPT | Performed by: NURSE PRACTITIONER

## 2023-02-16 PROCEDURE — 92557 COMPREHENSIVE HEARING TEST: CPT | Performed by: AUDIOLOGIST

## 2023-02-16 PROCEDURE — 92567 TYMPANOMETRY: CPT | Performed by: AUDIOLOGIST

## 2023-02-16 PROCEDURE — 3078F DIAST BP <80 MM HG: CPT | Performed by: NURSE PRACTITIONER

## 2023-02-16 PROCEDURE — 1123F ACP DISCUSS/DSCN MKR DOCD: CPT | Performed by: NURSE PRACTITIONER

## 2023-02-16 NOTE — PROGRESS NOTES
Ember Moser, a 67y.o. year old female, was seen in clinic today for a hearing evaluation on referral from Tameka Das NP. Patient complains of concerns of hearing loss and tinnitus. She also reports vertigo and states that her last episode was 3 weeks ago. No recent audiogram.     Otoscopy: normal external ear canals and visible tympanic membranes, bilaterally. Tympanometry: RE Type Ad, hypermobile  LE Type Ad, hypermobile    SRT: RE Speech Reception Threshold (SRT) was obtained at 40 dBHL LE Speech Reception Threshold (SRT) was obtained at 35 dBHL    WRS: RE Excellent in quiet when words were presented at 80 dBHL. WRS: LE Excellent in quiet when words were presented at 75 dBHL. Pure tone audiometry:  RE: (Borderline WNL at 250Hz) Mild sloping to profound primarily sensorineural hearing loss (mixed component 4000Hz)  LE: Mild sloping to moderate-severe sensorineural hearing loss    Sensorineural hearing loss, bilaterally. Asymmetry noted, R>L    Impressions:  hearing loss requiring medical/otologic and audiologic follow-up  Discussed results of today's testing with patient. Patient is a good candidate for amplification. Discussed benefits of hearing aids and recommended hearing aid evaluation to discuss options. Patient indicated understanding but stated that she would prefer to defer in favor of annual audiogram. Will schedule annual hearing test for patient. Plan:  Follow-up with NP. Hearing aid evaluation recommended. Repeat audiogram 1 year or sooner if change is noted.     Idalia Paige   Doctor of Audiology

## 2023-02-16 NOTE — PROGRESS NOTES
Subjective:    Percy Cisneros   67 y.o.   1950     New Patient Visit  This is a 67 y.o. female who is here for discussion of her hearing loss. She was seen prior to me by Dr. Ginette Lang for audiogram today Which showed RE: mild sloping to profound SNHL and LE: mild sloping to moderate-severe SNHL. He also complains of tinnitus in the left ear. She states her  has been telling her for about one year or so that she has hearing problems, and she feels like people around her mumble. She denies otalgia. 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 She does not know the underlying cause. This happened about 20 years ago. She also complains of some balance issues, when she walks she has to look at the ground and feels as though she is somewhat dizzy and unsteady at times. She does not notice any difference with head movement or when lying on one side or the other. Review of Systems  Review of Systems   Constitutional: Negative. HENT:  Positive for hearing loss and tinnitus. Eyes: Negative. Respiratory: Negative. Cardiovascular: Negative. Gastrointestinal: Negative. Genitourinary: Negative. Musculoskeletal: Negative. Skin: Negative. Neurological:  Positive for dizziness. Psychiatric/Behavioral: Negative.          Past Medical History:   Diagnosis Date    Anxiety     Autonomic dysfunction 12/04/1998    tilt test with marked heart rate variablility and drop in BP without hemodynamic collapse    Bladder stone     Cancer ([facility] [address] [address] 1997    left breast lumpectomy , Rx RT    COVID-19 virus infection 04/2022    Diabetes ([address]     pre-diabetic, NOT ON MEDICATION     Dilated cardiomyopathy ([address] 02/08/2006    nonischemic    Elevated triglycerides with high cholesterol 11/21/2014    Essential hypertension 02/09/2017    Hx MRSA infection 03/26/2018    L GROIN    Hypothyroidism 1994    Dr. Meche Cho. Insulin resistance 11/21/2014    Mixed hyperlipidemia with apolipoprotein E2 variant 11/21/2014    Osteoarthritis     diffuse. Dr. Viviane Carlos    Other and unspecified hyperlipidemia 12/21/2010    PONV (postoperative nausea and vomiting)     PVC's 1994    Sleep apnea     ON CPAP    Vitamin D deficiency 11/21/2014     Past Surgical History:   Procedure Laterality Date    HX APPENDECTOMY  1969    HX BILATERAL MASTECTOMY Bilateral 05/2015    HX BREAST AUGMENTATION Bilateral 12/18/2015    REVISION BILATERAL RECONSTRUCTED BREAST, FAT GRAFTING TO CHEST WALL, NIPPLE RECONSTRUCTION performed by Pam Colon MD at [facility] Bilateral 10/14/2016    REVISION BILATERAL BREAST RECONSTRUCTION / FAT GRAFTING TO CHEST WALL; REVISION OF ABDOMINAL SCAR performed by Pam Colon MD at [address]    HX BREAST LUMPECTOMY Left 1997    lumpectomy    HX BREAST RECONSTRUCTION Bilateral 09/28/2015    REVISION OF BILATERAL RECONSTRUCTED BREASTS, FAT GRAFTING TO CHEST WALL,  REVISION OF ABDOMINAL SCAR performed by Pam Colon MD at [address] Bilateral 2008    with lens implants    HX CHOLECYSTECTOMY  2015    HX COLONOSCOPY  2014    HX COLONOSCOPY  2019    HX COLONOSCOPY  05/13/2021    HX ENDOSCOPY  2014    HX GI      COLONOSCOPY    HX HEART CATHETERIZATION  1994    LVEF 60%. Normal. Dr. Espana Plants.     HX HERNIA REPAIR  4/22 16    HX HYSTERECTOMY  1986    left 1 ovary    HX KNEE REPLACEMENT Left 03/2022    HX KNEE REPLACEMENT Right 2018    HX ORTHOPAEDIC Left 1988    foot surgery    HX POLYPECTOMY      HX SALPINGO-OOPHORECTOMY  1988    remaining ovary removed    HX SHOULDER REPLACEMENT Left 10/2020    HX UROLOGICAL      BLADDER LIFT    HX WISDOM TEETH EXTRACTION        Family History   Problem Relation Age of Onset    Kidney Disease Mother     Cancer Mother         ovarian, esophageal cancer    Heart Disease Father     Coronary Art Dis Brother     No Known Problems Daughter     No Known Problems Daughter     No Known Problems Daughter     No Known Problems Daughter     Heart Attack Paternal Uncle         MI IN 40S    Heart Attack Paternal Grandmother         MI IN 40S    Anesth Problems Neg Hx      Social History     Tobacco Use    Smoking status: Never    Smokeless tobacco: Never   Substance Use Topics    Alcohol use: Not Currently     Alcohol/week: 0.0 standard drinks      Prior to Admission medications    Medication Sig Start Date End Date Taking? Authorizing Provider   metoprolol succinate (TOPROL-XL) 25 mg XL tablet Take 1 Tablet by mouth daily. 2/1/23   Papito Alvarado MD   cyanocobalamin (VITAMIN B12) 1,000 mcg/mL injection 1 mL by IntraMUSCular route every month. 2/1/23   Layla Cardona NP   levothyroxine (SYNTHROID) 112 mcg tablet Take 1 Tablet by mouth Daily (before breakfast). 2/1/23   Layla Cardona NP   escitalopram oxalate (LEXAPRO) 20 mg tablet Take 1 Tablet by mouth daily. 2/1/23   Layla Cardona NP   traZODone (DESYREL) 50 mg tablet Take 50 mg by mouth nightly as needed. Provider, Historical   metFORMIN (GLUCOPHAGE) 500 mg tablet Take 500 mg by mouth two (2) times daily (with meals). Provider, Historical   alirocumab (Praluent Pen) 150 mg/mL injector pen 150 mg by SubCUTAneous route Once every 2 weeks. 3/7/22   Provider, Historical   fenofibrate nanocrystallized (TRICOR) 145 mg tablet Take 1 Tablet by mouth daily. 8/23/22   Papito Alvarado MD   losartan (COZAAR) 50 mg tablet TAKE 1 TABLET DAILY 6/6/22   Papito Alvarado MD   multivitamin (ONE A DAY) tablet Take 1 Tablet by mouth daily. Provider, Historical   cholecalciferol (VITAMIN D3) (2,000 UNITS /50 MCG) cap capsule Take  by mouth daily. Provider, Historical   busPIRone (BUSPAR) 10 mg tablet Take 10 mg by mouth daily as needed.  7/23/21   Provider, Historical   clobetasoL (TEMOVATE) 0.05 % topical cream  3/10/21   Provider, Historical   aspirin delayed-release 81 mg tablet Take 81 mg by mouth nightly. Provider, Historical        Allergies   Allergen Reactions    Atorvastatin Myalgia    Lactose Diarrhea and Nausea Only    Sulfa (Sulfonamide Antibiotics) Hives         Objective:     Visit Vitals  /70 (BP 1 Location: Right upper arm, BP Patient Position: Sitting, BP Cuff Size: Adult)   Pulse 65   Resp 19   Ht 5' 2\" (1.575 m)   Wt 181 lb (82.1 kg)   SpO2 98%   BMI 33.11 kg/m²        Physical Exam  Constitutional:       General: She is not in acute distress. Appearance: Normal appearance. She is obese. She is not ill-appearing, toxic-appearing or diaphoretic. HENT:      Head: Normocephalic and atraumatic. Right Ear: Tympanic membrane, ear canal and external ear normal. There is no impacted cerumen. Left Ear: Ear canal and external ear normal. There is no impacted cerumen. Ears:      Comments: Left TM scarring     Nose: Nose normal. No congestion or rhinorrhea. Mouth/Throat:      Pharynx: Oropharynx is clear. No oropharyngeal exudate or posterior oropharyngeal erythema. Eyes:      General: No scleral icterus. Right eye: No discharge. Left eye: No discharge. Extraocular Movements: Extraocular movements intact. Conjunctiva/sclera: Conjunctivae normal.      Pupils: Pupils are equal, round, and reactive to light. Neck:      Vascular: No carotid bruit. Cardiovascular:      Rate and Rhythm: Normal rate and regular rhythm. Pulses: Normal pulses. Heart sounds: Normal heart sounds. No murmur heard. No friction rub. Pulmonary:      Effort: Pulmonary effort is normal.      Breath sounds: Normal breath sounds. Abdominal:      General: Abdomen is flat. Musculoskeletal:         General: Normal range of motion. Cervical back: Normal range of motion and neck supple. No rigidity or tenderness. Lymphadenopathy:      Cervical: No cervical adenopathy. Skin:     General: Skin is warm and dry.       Capillary Refill: Capillary refill takes less than 2 seconds. Neurological:      General: No focal deficit present. Mental Status: She is alert and oriented to person, place, and time. Mental status is at baseline. Psychiatric:         Mood and Affect: Mood normal.         Behavior: Behavior normal.         Thought Content: Thought content normal.         Judgment: Judgment normal.       Assessment/Plan:     Encounter Diagnoses   Name Primary? Sensorineural hearing loss (SNHL) of both ears Yes     No orders of the defined types were placed in this encounter.     SNHL   -She is not interested in hearing aids at this time   -Recommend annual audiogram   -She states she will let us know if she would like to move forward with hearing aids in the future   -Hallpike negative.   -Dizziness could be related to hearing loss       Thank you for referring this patient,    [facility]     [address]  ENT  [phone]

## 2023-02-21 ENCOUNTER — PATIENT MESSAGE (OUTPATIENT)
Dept: FAMILY MEDICINE CLINIC | Age: 73
End: 2023-02-21

## 2023-02-21 DIAGNOSIS — F51.01 PRIMARY INSOMNIA: Primary | ICD-10-CM

## 2023-02-21 RX ORDER — TRAZODONE HYDROCHLORIDE 50 MG/1
50 TABLET ORAL
Qty: 90 TABLET | Refills: 0 | Status: SHIPPED | OUTPATIENT
Start: 2023-02-21

## 2023-04-14 DIAGNOSIS — F51.01 PRIMARY INSOMNIA: ICD-10-CM

## 2023-04-14 DIAGNOSIS — F41.1 GENERALIZED ANXIETY DISORDER: ICD-10-CM

## 2023-04-14 RX ORDER — ESCITALOPRAM OXALATE 20 MG/1
20 TABLET ORAL DAILY
Qty: 90 TABLET | Refills: 0 | Status: SHIPPED | OUTPATIENT
Start: 2023-04-14

## 2023-04-14 RX ORDER — LEVOTHYROXINE SODIUM 112 UG/1
112 TABLET ORAL
Qty: 90 TABLET | Refills: 0 | Status: SHIPPED | OUTPATIENT
Start: 2023-04-14

## 2023-04-14 RX ORDER — TRAZODONE HYDROCHLORIDE 50 MG/1
50 TABLET ORAL
Qty: 90 TABLET | Refills: 0 | Status: SHIPPED | OUTPATIENT
Start: 2023-04-14

## 2023-04-24 NOTE — PROGRESS NOTES
Ivana Martinez MD    [address] [facility], [address] [facility] [address]    Office [phone],ESG [phone]      Parish Estrada is a 68 y.o. female is here for f/u visit . Primary care physician:  Ramila Major NP      Chief complaint:    As documented in EMR    Assessment:    Hx of PVC/nonsustained VT - was on Mexiletine. It was discontinued because Holter monitor did not show any PVCs/significant abnormal rhythms. E cardio event monitor after stopping mexiletine (2018)-sinus rhythm/1 PVC/no significant arrhythmias  Hx of non ischemic cardiomyopathy -resolved ;normal EF on ECHO 11/2014 ; 10/2016 ; 11/8/17  Hx of orthostasis/dizziness - currently aysmpotmatic  HTN  HLD  Chronic fatigue  History of idiopathic peripheral neuropathy-numbness/tingling both lower extremities, history of \"cold sensation\" in both feet towards the end of the day. Bilateral leg cramps  Intolerance to statins-myalgia      Plan:     Repeat ERIC both lower extremities  Advised to explore further treatment options regarding her peripheral neuropathy-she does not want to follow-up with her neurologist for now. ?-Would benefit from referral to rheumatologist.  Radha Chung to discuss with PCP  Tolerating Repatha-LDL 1/2023-47  BP controlled  Followed by Dr. Gardner-endocrinologist   Aggressive cardiovascular risk factor modification. Advised to lose weight. Follow-up in 2 months or earlier as needed      Patient understands the plan. All questions were answered to the patient's satisfaction. Medication Side Effects and Warnings were discussed with patient: yes  Patient Labs were reviewed and or requested:  yes  Patient Past Records were reviewed and or requested: yes    I appreciate the opportunity to be involved in Ms. Marina Kwan. See note below for details. Please do not hesitate to contact us with questions or concerns. Ivana Martinez MD    Cardiac Testing/ Procedures: A. Cardiac Cath/PCI:    B.ECHO/ISAAC:    3/12/20 -EF 55-60%/Grade 1 DD/Trace MR  11/8/17 - EF 55-60%/Grade 1 DD  10/5/16-EF 78-27%, grade 1 diastolic dysfunction, mild TR  11/21/14Left ventricle: Systolic function was normal. Ejection fraction was  estimated to be 55 %. There were no regional wall motion abnormalities. Doppler parameters were consistent with abnormal left ventricular  relaxation (grade 1 diastolic dysfunction). Left atrium: The atrium was mildly dilated. Mitral valve: There was mild regurgitation. Tricuspid valve: There was mild regurgitation. 10/2013 - EF 55%    7/2007 - EF 45%    C. StressNuclear/Stress ECHO/Stress test: [facility] Stress ECHO 9/29/20 - 4.1 min  11/8/17 - Treadmill test - 4.39 min/nml  04/99 - Persantine cardiolite - EF 48%/No inducible ischemia      D. Vascular: 3/12/20 - [facility] ERIC bilat    E. EP: Hx on nonsustained VT/PVC- was on mexilitine  12/4/98 - tilt test with marked heart rate variablility and drop in BP without hemodynamic collapse    8/9/16 - Holter - One blocked P wave ( 4.50 AM) ; no PV; SR  2/11/18 to 3/12/18E  cardio event monitor-sinus rhythm/one PVC    F. Miscellaneous:    Subjective:  Kee Mariscal is a 68 y.o. female who returns for follow up  Visit. Doing well  No papitations/dizziness  No CP/dyspnea/swelling lower extremities. Chronic fatigue which has not changed significantly. Tolerating Repatha. Chronic pain/numbness both lower extremities. Also complains of coldness in her feet towards the end of the day requiring her to sit in a warm tub to warm up her feet. She has been followed by neurology and last visit was approximately 2 years ago. She has been diagnosed with idiopathic peripheral neuropathy and has tried multiple medications including gabapentin which has not helped him. Also complains of cramping sensation both lower extremities. Has had normal ERIC previously (2020). Chronic mild swelling lower extremities.   Has had ultrasound lower extremities-negative for DVT previously. ROS:  (bold if positive, if negative)           Medications before admission:    Current Outpatient Medications   Medication Sig Dispense    levothyroxine (SYNTHROID) 112 mcg tablet Take 1 Tablet by mouth Daily (before breakfast). 90 Tablet    escitalopram oxalate (LEXAPRO) 20 mg tablet Take 1 Tablet by mouth daily. 90 Tablet    traZODone (DESYREL) 50 mg tablet Take 1 Tablet by mouth nightly as needed for Sleep. 90 Tablet    losartan (COZAAR) 50 mg tablet TAKE 1 TABLET DAILY 90 Tablet    fenofibrate nanocrystallized (TRICOR) 145 mg tablet Take 1 Tablet by mouth daily. 90 Tablet    metoprolol succinate (TOPROL-XL) 25 mg XL tablet Take 1 Tablet by mouth daily. 90 Tablet    Praluent Pen 150 mg/mL injector pen INJECT 1ML SUBCUTANEOUSLY ONCE EVERY 2 WEEKS 6 mL    cyanocobalamin (VITAMIN B12) 1,000 mcg/mL injection 1 mL by IntraMUSCular route every month. 6 mL    metFORMIN (GLUCOPHAGE) 500 mg tablet Take 1 Tablet by mouth two (2) times daily (with meals). cholecalciferol (VITAMIN D3) (2,000 UNITS /50 MCG) cap capsule Take  by mouth daily. busPIRone (BUSPAR) 10 mg tablet Take 1 Tablet by mouth daily as needed. clobetasoL (TEMOVATE) 0.05 % topical cream      aspirin delayed-release 81 mg tablet Take 1 Tablet by mouth nightly. multivitamin (ONE A DAY) tablet Take 1 Tablet by mouth daily. (Patient not taking: Reported on 4/25/2023)      No current facility-administered medications for this visit. Physical Exam:  Visit Vitals  /82 (BP 1 Location: Right arm, BP Patient Position: Sitting)   Pulse 63   Ht 5' 2\" (1.575 m)   Wt 176 lb (79.8 kg)   SpO2 98%   BMI 32.19 kg/m²            Gen: Well-developed, well-nourished, in no acute distress  Neck: Supple,No JVD, No Carotid Bruit,   Resp: No accessory muscle use, Clear breath sounds, No rales or rhonchi  Card: Regular Rate,Rythm,Normal S1, S2, No murmurs, rubs or gallop. No thrills.    Abd:  Soft,BS+,   MSK: No cyanosis  Skin: No rashes    Neuro: moving all four extremities , follows commands appropriately  Psych:  Good insight, oriented to person, place , alert, Nml Affect  LE: No edema  Bilateral feet-warm, bilateral DP-not palpable, bilateral PT-1+    EKG -sinus rhythm, nonspecific ST-T changes        LABS:        Lab Results   Component Value Date/Time    WBC 6.7 12/08/2022 08:41 AM    HGB 12.0 12/08/2022 08:41 AM    HCT 37.6 12/08/2022 08:41 AM    PLATELET 850 33/72/5874 08:41 AM    MCV 87 12/08/2022 08:41 AM     Lab Results   Component Value Date/Time    Sodium 140 03/15/2022 03:22 AM    Potassium 4.1 03/15/2022 03:22 AM    Chloride 114 (H) 03/15/2022 03:22 AM    CO2 21 03/15/2022 03:22 AM    Anion gap 5 03/15/2022 03:22 AM    Glucose 116 (H) 03/15/2022 03:22 AM    Glucose 97 03/04/2021 10:32 AM    BUN 14 03/15/2022 03:22 AM    Creatinine 0.65 03/15/2022 03:22 AM    BUN/Creatinine ratio 22 (H) 03/15/2022 03:22 AM    GFR est AA >60 03/15/2022 03:22 AM    GFR est non-AA >60 03/15/2022 03:22 AM    Calcium 8.8 03/15/2022 03:22 AM       Lab Results   Component Value Date/Time    aPTT 25.2 09/14/2015 12:06 PM     Lab Results   Component Value Date/Time    INR 1.0 03/09/2022 09:41 AM    INR 1.1 03/26/2018 11:47 AM    INR 1.0 09/14/2015 12:06 PM    Prothrombin time 10.6 03/09/2022 09:41 AM    Prothrombin time 11.2 (H) 03/26/2018 11:47 AM    Prothrombin time 10.4 09/14/2015 12:06 PM     No components found for: Cresencio Jerez MD

## 2023-04-25 ENCOUNTER — OFFICE VISIT (OUTPATIENT)
Dept: CARDIOLOGY CLINIC | Age: 73
End: 2023-04-25
Payer: MEDICARE

## 2023-04-25 VITALS
BODY MASS INDEX: 32.39 KG/M2 | WEIGHT: 176 LBS | SYSTOLIC BLOOD PRESSURE: 130 MMHG | OXYGEN SATURATION: 98 % | HEART RATE: 63 BPM | HEIGHT: 62 IN | DIASTOLIC BLOOD PRESSURE: 82 MMHG

## 2023-04-25 DIAGNOSIS — I49.3 PVC (PREMATURE VENTRICULAR CONTRACTION): ICD-10-CM

## 2023-04-25 DIAGNOSIS — E78.5 DYSLIPIDEMIA: ICD-10-CM

## 2023-04-25 DIAGNOSIS — R25.2 BILATERAL LEG CRAMPS: ICD-10-CM

## 2023-04-25 DIAGNOSIS — R53.83 FATIGUE, UNSPECIFIED TYPE: ICD-10-CM

## 2023-04-25 DIAGNOSIS — I10 ESSENTIAL HYPERTENSION: Primary | ICD-10-CM

## 2023-04-25 PROCEDURE — G8432 DEP SCR NOT DOC, RNG: HCPCS | Performed by: INTERNAL MEDICINE

## 2023-04-25 PROCEDURE — G8536 NO DOC ELDER MAL SCRN: HCPCS | Performed by: INTERNAL MEDICINE

## 2023-04-25 PROCEDURE — 93010 ELECTROCARDIOGRAM REPORT: CPT | Performed by: INTERNAL MEDICINE

## 2023-04-25 PROCEDURE — G8427 DOCREV CUR MEDS BY ELIG CLIN: HCPCS | Performed by: INTERNAL MEDICINE

## 2023-04-25 PROCEDURE — 1123F ACP DISCUSS/DSCN MKR DOCD: CPT | Performed by: INTERNAL MEDICINE

## 2023-04-25 PROCEDURE — 3079F DIAST BP 80-89 MM HG: CPT | Performed by: INTERNAL MEDICINE

## 2023-04-25 PROCEDURE — 93005 ELECTROCARDIOGRAM TRACING: CPT | Performed by: INTERNAL MEDICINE

## 2023-04-25 PROCEDURE — 3075F SYST BP GE 130 - 139MM HG: CPT | Performed by: INTERNAL MEDICINE

## 2023-04-25 PROCEDURE — G8417 CALC BMI ABV UP PARAM F/U: HCPCS | Performed by: INTERNAL MEDICINE

## 2023-04-25 PROCEDURE — 1101F PT FALLS ASSESS-DOCD LE1/YR: CPT | Performed by: INTERNAL MEDICINE

## 2023-04-25 PROCEDURE — 3017F COLORECTAL CA SCREEN DOC REV: CPT | Performed by: INTERNAL MEDICINE

## 2023-04-25 PROCEDURE — 99214 OFFICE O/P EST MOD 30 MIN: CPT | Performed by: INTERNAL MEDICINE

## 2023-04-25 PROCEDURE — 1090F PRES/ABSN URINE INCON ASSESS: CPT | Performed by: INTERNAL MEDICINE

## 2023-04-25 PROCEDURE — G0463 HOSPITAL OUTPT CLINIC VISIT: HCPCS | Performed by: INTERNAL MEDICINE

## 2023-04-25 NOTE — PROGRESS NOTES
Chief Complaint   Patient presents with    Follow-up    Cholesterol Problem    Hypertension     Vitals:    04/25/23 1201   BP: 130/82   BP 1 Location: Right arm   BP Patient Position: Sitting   Pulse: 63   Height: 5' 2\" (1.575 m)   Weight: 176 lb (79.8 kg)   SpO2: 98%       Chest pain denied     SOB denied     Palpitations denied     Swelling in hands/feet denied     Dizziness denied     Recent hospital stays denied     Refills denied

## 2023-04-26 DIAGNOSIS — M79.604 BILATERAL LEG PAIN: Primary | ICD-10-CM

## 2023-04-26 DIAGNOSIS — M79.605 BILATERAL LEG PAIN: Primary | ICD-10-CM

## 2023-05-03 ENCOUNTER — TELEPHONE (OUTPATIENT)
Dept: CARDIOLOGY CLINIC | Age: 73
End: 2023-05-03

## 2023-05-03 DIAGNOSIS — R25.2 BILATERAL LEG CRAMPS: ICD-10-CM

## 2023-05-03 DIAGNOSIS — G62.89 PERIPHERAL NEUROPATHY DUE TO ISCHEMIA: ICD-10-CM

## 2023-05-03 DIAGNOSIS — M79.604 BILATERAL LEG PAIN: Primary | ICD-10-CM

## 2023-05-03 DIAGNOSIS — M79.605 BILATERAL LEG PAIN: Primary | ICD-10-CM

## 2023-05-09 ENCOUNTER — ANCILLARY PROCEDURE (OUTPATIENT)
Age: 73
End: 2023-05-09
Payer: MEDICARE

## 2023-05-09 DIAGNOSIS — M79.605 BILATERAL LEG PAIN: ICD-10-CM

## 2023-05-09 DIAGNOSIS — G62.89 PERIPHERAL NEUROPATHY DUE TO ISCHEMIA: ICD-10-CM

## 2023-05-09 DIAGNOSIS — R25.2 BILATERAL LEG CRAMPS: ICD-10-CM

## 2023-05-09 DIAGNOSIS — M79.604 BILATERAL LEG PAIN: ICD-10-CM

## 2023-05-09 LAB
VAS LEFT ABI: 1.33
VAS LEFT DORSALIS PEDIS BP: 148 MMHG
VAS LEFT PTA BP: 160 MMHG
VAS LEFT TBI: 0.76
VAS LEFT TOE PRESSURE: 91 MMHG
VAS RIGHT ABI: 1.29
VAS RIGHT ARM BP: 120 MMHG
VAS RIGHT DORSALIS PEDIS BP: 155 MMHG
VAS RIGHT PTA BP: 153 MMHG
VAS RIGHT TBI: 0.78
VAS RIGHT TOE PRESSURE: 93 MMHG

## 2023-05-09 PROCEDURE — 93922 UPR/L XTREMITY ART 2 LEVELS: CPT | Performed by: INTERNAL MEDICINE

## 2023-05-09 PROCEDURE — 93922 UPR/L XTREMITY ART 2 LEVELS: CPT

## 2023-05-10 ENCOUNTER — TELEPHONE (OUTPATIENT)
Age: 73
End: 2023-05-10

## 2023-05-10 NOTE — TELEPHONE ENCOUNTER
----- Message from Elliot Patel LPN sent at 2/6/3627  2:28 PM EDT -----  Regarding: FW: Test  Contact: [phone]    ----- Message -----  From: Deniz Cowart  Sent: 5/9/2023   1:53 PM EDT  To: Javier Freedman [facility] Clinical Staff  Subject: Test                                             Would someone please call me and explain the results of the test I had today. Thank you.

## 2023-06-02 RX ORDER — TRAZODONE HYDROCHLORIDE 50 MG/1
TABLET ORAL
Qty: 90 TABLET | Refills: 1 | Status: SHIPPED | OUTPATIENT
Start: 2023-06-02

## 2023-07-27 ENCOUNTER — TELEPHONE (OUTPATIENT)
Facility: CLINIC | Age: 73
End: 2023-07-27

## 2023-07-27 NOTE — TELEPHONE ENCOUNTER
Pt called in regards to being referred to a foot specialist a couple years ago by SHEILA Badillo. Was unable to find anything in new or old system.  Pt stated she now needs to go back and if there are an recommendations for a specific one

## 2023-07-27 NOTE — TELEPHONE ENCOUNTER
Left message advising patient of the foot specialist she seen years ago. Advised patient to call us back with any questions or if she needs a referral to go see them again.

## 2023-07-31 ENCOUNTER — OFFICE VISIT (OUTPATIENT)
Age: 73
End: 2023-07-31
Payer: MEDICARE

## 2023-07-31 VITALS
SYSTOLIC BLOOD PRESSURE: 102 MMHG | BODY MASS INDEX: 30.55 KG/M2 | HEART RATE: 78 BPM | WEIGHT: 166 LBS | DIASTOLIC BLOOD PRESSURE: 68 MMHG | OXYGEN SATURATION: 98 % | HEIGHT: 62 IN

## 2023-07-31 DIAGNOSIS — I42.8 OTHER CARDIOMYOPATHIES (HCC): ICD-10-CM

## 2023-07-31 DIAGNOSIS — I10 ESSENTIAL (PRIMARY) HYPERTENSION: Primary | ICD-10-CM

## 2023-07-31 DIAGNOSIS — E78.5 HYPERLIPIDEMIA, UNSPECIFIED HYPERLIPIDEMIA TYPE: ICD-10-CM

## 2023-07-31 DIAGNOSIS — R53.83 OTHER FATIGUE: ICD-10-CM

## 2023-07-31 DIAGNOSIS — M79.605 BILATERAL LEG PAIN: ICD-10-CM

## 2023-07-31 DIAGNOSIS — I49.3 VENTRICULAR PREMATURE DEPOLARIZATION: ICD-10-CM

## 2023-07-31 DIAGNOSIS — M79.604 BILATERAL LEG PAIN: ICD-10-CM

## 2023-07-31 PROCEDURE — 1090F PRES/ABSN URINE INCON ASSESS: CPT

## 2023-07-31 PROCEDURE — G8417 CALC BMI ABV UP PARAM F/U: HCPCS

## 2023-07-31 PROCEDURE — 99214 OFFICE O/P EST MOD 30 MIN: CPT

## 2023-07-31 PROCEDURE — 3078F DIAST BP <80 MM HG: CPT

## 2023-07-31 PROCEDURE — G8427 DOCREV CUR MEDS BY ELIG CLIN: HCPCS

## 2023-07-31 PROCEDURE — 1123F ACP DISCUSS/DSCN MKR DOCD: CPT

## 2023-07-31 PROCEDURE — 3017F COLORECTAL CA SCREEN DOC REV: CPT

## 2023-07-31 PROCEDURE — G8399 PT W/DXA RESULTS DOCUMENT: HCPCS

## 2023-07-31 PROCEDURE — 3074F SYST BP LT 130 MM HG: CPT

## 2023-07-31 PROCEDURE — 1036F TOBACCO NON-USER: CPT

## 2023-07-31 RX ORDER — SACROSIDASE 8500 [IU]/ML
8500 SOLUTION ORAL
COMMUNITY
Start: 2023-07-25

## 2023-07-31 NOTE — PROGRESS NOTES
Chief Complaint   Patient presents with    Follow-up     3 mo     Hypertension    Hyperlipidemia     Vitals:    07/31/23 0856   BP: 102/68   Site: Right Upper Arm   Position: Sitting   Pulse: 78   SpO2: 98%   Weight: 166 lb (75.3 kg)   Height: 5' 2\" (1.575 m)         Chest pain denied     SOB denied     Palpitations denied     Swelling in hands/feet denied     Dizziness denied     Recent hospital stays denied     Refills denied
dysuria  Musculoskeletal: Negative for myalgias. Skin: Negative for rash  Heme: No problems bleeding. Neurological: Negative for speech change and focal weakness. Past Medical History:   Diagnosis Date    Anxiety     Autonomic dysfunction 12/04/1998    tilt test with marked heart rate variablility and drop in BP without hemodynamic collapse    Bladder stone     Cancer ([address]) 1997    left breast lumpectomy , Rx RT    COVID-19 virus infection 04/2022    Diabetes ([address])     pre-diabetic, NOT ON MEDICATION     Dilated cardiomyopathy ([address]) 02/08/2006    nonischemic    Elevated triglycerides with high cholesterol 11/21/2014    Essential hypertension 02/09/2017    Hx MRSA infection 03/26/2018    L GROIN    Hypothyroidism 1994    Dr. Maddie Wild.    Insulin resistance 11/21/2014    Mixed hyperlipidemia with apolipoprotein E2 variant 11/21/2014    Osteoarthritis     diffuse. Dr. Joey Melo    Other and unspecified hyperlipidemia 12/21/2010    PONV (postoperative nausea and vomiting)     Sleep apnea     ON CPAP    Vitamin D deficiency 11/21/2014         Past Surgical History:   Procedure Laterality Date    APPENDECTOMY  1969    BREAST LUMPECTOMY Left 1997    lumpectomy    BREAST RECONSTRUCTION Bilateral 09/28/2015    REVISION OF BILATERAL RECONSTRUCTED BREASTS, FAT GRAFTING TO CHEST WALL,  REVISION OF ABDOMINAL SCAR performed by Shari Rivers MD at [address] [facility] Bilateral 10/14/2016    REVISION BILATERAL BREAST RECONSTRUCTION / FAT GRAFTING TO CHEST WALL; REVISION OF ABDOMINAL SCAR performed by Shari Rivers MD at [address] [place] [address] Bilateral 12/18/2015    REVISION BILATERAL RECONSTRUCTED BREAST, FAT GRAFTING TO CHEST WALL, NIPPLE RECONSTRUCTION performed by Shari Rivers MD at [facility]    LVEF 60%. Normal. Dr. Juanito Hopkins.     CATARACT REMOVAL Bilateral 2008    with lens implants    CHOLECYSTECTOMY  2015    COLONOSCOPY  2019    COLONOSCOPY

## 2023-09-15 ENCOUNTER — TELEPHONE (OUTPATIENT)
Age: 73
End: 2023-09-15

## 2023-09-15 NOTE — TELEPHONE ENCOUNTER
Please advise. Phentermine / Topiramate (Qsymia)   LOV 7/31/23 with Darinel Rock    Hx of PVC/nonsustained VT - was on Mexiletine. It was discontinued because Holter monitor did not show any PVCs/significant abnormal rhythms.   E cardio event monitor after stopping mexiletine (2018)-sinus rhythm/1 PVC/no significant arrhythmias,     Hx of non ischemic cardiomyopathy -resolved ;normal EF on ECHO 11/2014 ; 10/2016 ; 11/8/17     Hx of orthostasis/dizziness - currently aysmpotmatic

## 2023-09-15 NOTE — TELEPHONE ENCOUNTER
Pts endocrinologist Dr. Aubrey Barros would like to put pt on  Uqzemia (weight loss med) , pt needs approval from Dr. Bang Reed. Pt also would like last office note and ekg sent as well. Pt requested a call back from nurse.      Dr. Aubrey Barros # [phone]  Fax # [phone]

## 2023-09-19 NOTE — TELEPHONE ENCOUNTER
Reviewed recommendation with patient over phone and sent via [employer]. Also faxed info to Dr. Nate Arredondo office as requested.

## 2023-09-20 ENCOUNTER — CLINICAL DOCUMENTATION (OUTPATIENT)
Age: 73
End: 2023-09-20

## 2023-09-20 NOTE — PROGRESS NOTES
Fax number needed for Dr. Farheen Quinones office should they need documentation regarding patient starting new medications. Attempted several times to send to fax number in message which is the same in the web site. Note was also sent to patient via [employer].

## 2023-09-22 ENCOUNTER — TELEPHONE (OUTPATIENT)
Age: 73
End: 2023-09-22

## 2023-10-27 RX ORDER — CYANOCOBALAMIN 1000 UG/ML
INJECTION INTRAMUSCULAR; SUBCUTANEOUS
Qty: 6 ML | Refills: 0 | Status: SHIPPED | OUTPATIENT
Start: 2023-10-27

## 2023-12-29 ENCOUNTER — PATIENT MESSAGE (OUTPATIENT)
Age: 73
End: 2023-12-29

## 2023-12-29 ENCOUNTER — PATIENT MESSAGE (OUTPATIENT)
Facility: CLINIC | Age: 73
End: 2023-12-29

## 2023-12-29 DIAGNOSIS — E78.5 HYPERLIPIDEMIA, UNSPECIFIED HYPERLIPIDEMIA TYPE: ICD-10-CM

## 2023-12-29 DIAGNOSIS — I10 ESSENTIAL (PRIMARY) HYPERTENSION: Primary | ICD-10-CM

## 2023-12-29 RX ORDER — METOPROLOL SUCCINATE 25 MG/1
25 TABLET, EXTENDED RELEASE ORAL DAILY
Qty: 90 TABLET | Refills: 3 | Status: SHIPPED | OUTPATIENT
Start: 2023-12-29

## 2023-12-29 RX ORDER — LOSARTAN POTASSIUM 50 MG/1
50 TABLET ORAL DAILY
Qty: 90 TABLET | Refills: 3 | Status: SHIPPED | OUTPATIENT
Start: 2023-12-29

## 2023-12-29 RX ORDER — FENOFIBRATE 145 MG/1
145 TABLET, COATED ORAL DAILY
Qty: 90 TABLET | Refills: 3 | Status: SHIPPED | OUTPATIENT
Start: 2023-12-29

## 2023-12-29 NOTE — TELEPHONE ENCOUNTER
Requested Prescriptions     Signed Prescriptions Disp Refills    fenofibrate (TRICOR) 145 MG tablet 90 tablet 3     Sig: Take 1 tablet by mouth daily     Authorizing Provider: Sasha Peña     Ordering User: SHAMEKA Chauahn    losartan (COZAAR) 50 MG tablet 90 tablet 3     Sig: Take 1 tablet by mouth daily     Authorizing Provider: Sasha Peña     Ordering User: SHAMEKA Chauhan    metoprolol succinate (TOPROL XL) 25 MG extended release tablet 90 tablet 3     Sig: Take 1 tablet by mouth daily     Authorizing Provider: Sasha Peña     Ordering User: Liz Murillo

## 2023-12-29 NOTE — TELEPHONE ENCOUNTER
From: Neeta Urena  To: Ok Ko  Sent: 12/29/2023 3:01 PM EST  Subject: Meds    I need my prescription for my lexapro refilled. I have an trupti. later in January Thank you.

## 2023-12-31 RX ORDER — ESCITALOPRAM OXALATE 20 MG/1
20 TABLET ORAL DAILY
Qty: 90 TABLET | Refills: 0 | Status: SHIPPED | OUTPATIENT
Start: 2023-12-31

## 2023-12-31 RX ORDER — TRAZODONE HYDROCHLORIDE 50 MG/1
50 TABLET ORAL NIGHTLY PRN
Qty: 90 TABLET | Refills: 0 | Status: SHIPPED | OUTPATIENT
Start: 2023-12-31

## 2024-01-19 DIAGNOSIS — I42.8 OTHER CARDIOMYOPATHIES (HCC): ICD-10-CM

## 2024-01-19 DIAGNOSIS — E78.5 HYPERLIPIDEMIA, UNSPECIFIED HYPERLIPIDEMIA TYPE: Primary | ICD-10-CM

## 2024-01-19 DIAGNOSIS — I49.3 VENTRICULAR PREMATURE DEPOLARIZATION: ICD-10-CM

## 2024-01-19 RX ORDER — ALIROCUMAB 150 MG/ML
INJECTION, SOLUTION SUBCUTANEOUS
Qty: 6 ML | OUTPATIENT
Start: 2024-01-19

## 2024-01-19 RX ORDER — ALIROCUMAB 150 MG/ML
150 INJECTION, SOLUTION SUBCUTANEOUS
Qty: 6 ML | Refills: 3 | Status: SHIPPED | OUTPATIENT
Start: 2024-01-19

## 2024-01-27 SDOH — ECONOMIC STABILITY: HOUSING INSECURITY
IN THE LAST 12 MONTHS, WAS THERE A TIME WHEN YOU DID NOT HAVE A STEADY PLACE TO SLEEP OR SLEPT IN A SHELTER (INCLUDING NOW)?: NO

## 2024-01-27 SDOH — ECONOMIC STABILITY: INCOME INSECURITY: HOW HARD IS IT FOR YOU TO PAY FOR THE VERY BASICS LIKE FOOD, HOUSING, MEDICAL CARE, AND HEATING?: NOT HARD AT ALL

## 2024-01-27 SDOH — HEALTH STABILITY: PHYSICAL HEALTH: ON AVERAGE, HOW MANY DAYS PER WEEK DO YOU ENGAGE IN MODERATE TO STRENUOUS EXERCISE (LIKE A BRISK WALK)?: 3 DAYS

## 2024-01-27 SDOH — ECONOMIC STABILITY: FOOD INSECURITY: WITHIN THE PAST 12 MONTHS, THE FOOD YOU BOUGHT JUST DIDN'T LAST AND YOU DIDN'T HAVE MONEY TO GET MORE.: NEVER TRUE

## 2024-01-27 SDOH — ECONOMIC STABILITY: TRANSPORTATION INSECURITY
IN THE PAST 12 MONTHS, HAS LACK OF TRANSPORTATION KEPT YOU FROM MEETINGS, WORK, OR FROM GETTING THINGS NEEDED FOR DAILY LIVING?: NO

## 2024-01-27 SDOH — HEALTH STABILITY: PHYSICAL HEALTH: ON AVERAGE, HOW MANY MINUTES DO YOU ENGAGE IN EXERCISE AT THIS LEVEL?: 30 MIN

## 2024-01-27 SDOH — ECONOMIC STABILITY: FOOD INSECURITY: WITHIN THE PAST 12 MONTHS, YOU WORRIED THAT YOUR FOOD WOULD RUN OUT BEFORE YOU GOT MONEY TO BUY MORE.: NEVER TRUE

## 2024-01-27 ASSESSMENT — PATIENT HEALTH QUESTIONNAIRE - PHQ9
SUM OF ALL RESPONSES TO PHQ QUESTIONS 1-9: 0
2. FEELING DOWN, DEPRESSED OR HOPELESS: 0
SUM OF ALL RESPONSES TO PHQ QUESTIONS 1-9: 0
SUM OF ALL RESPONSES TO PHQ QUESTIONS 1-9: 0
1. LITTLE INTEREST OR PLEASURE IN DOING THINGS: 0
SUM OF ALL RESPONSES TO PHQ9 QUESTIONS 1 & 2: 0
SUM OF ALL RESPONSES TO PHQ QUESTIONS 1-9: 0

## 2024-01-27 ASSESSMENT — LIFESTYLE VARIABLES
HOW OFTEN DO YOU HAVE SIX OR MORE DRINKS ON ONE OCCASION: 1
HOW OFTEN DO YOU HAVE A DRINK CONTAINING ALCOHOL: NEVER
HOW OFTEN DO YOU HAVE A DRINK CONTAINING ALCOHOL: 1
HOW MANY STANDARD DRINKS CONTAINING ALCOHOL DO YOU HAVE ON A TYPICAL DAY: 0
HOW MANY STANDARD DRINKS CONTAINING ALCOHOL DO YOU HAVE ON A TYPICAL DAY: PATIENT DOES NOT DRINK

## 2024-01-30 ENCOUNTER — OFFICE VISIT (OUTPATIENT)
Facility: CLINIC | Age: 74
End: 2024-01-30
Payer: MEDICARE

## 2024-01-30 VITALS
HEART RATE: 75 BPM | SYSTOLIC BLOOD PRESSURE: 114 MMHG | RESPIRATION RATE: 20 BRPM | WEIGHT: 178.6 LBS | HEIGHT: 64 IN | TEMPERATURE: 97.8 F | DIASTOLIC BLOOD PRESSURE: 62 MMHG | BODY MASS INDEX: 30.49 KG/M2 | OXYGEN SATURATION: 98 %

## 2024-01-30 DIAGNOSIS — Z00.00 MEDICARE ANNUAL WELLNESS VISIT, SUBSEQUENT: Primary | ICD-10-CM

## 2024-01-30 PROCEDURE — 3074F SYST BP LT 130 MM HG: CPT | Performed by: FAMILY MEDICINE

## 2024-01-30 PROCEDURE — G8484 FLU IMMUNIZE NO ADMIN: HCPCS | Performed by: FAMILY MEDICINE

## 2024-01-30 PROCEDURE — 3078F DIAST BP <80 MM HG: CPT | Performed by: FAMILY MEDICINE

## 2024-01-30 PROCEDURE — 3017F COLORECTAL CA SCREEN DOC REV: CPT | Performed by: FAMILY MEDICINE

## 2024-01-30 PROCEDURE — G0439 PPPS, SUBSEQ VISIT: HCPCS | Performed by: FAMILY MEDICINE

## 2024-01-30 PROCEDURE — 1123F ACP DISCUSS/DSCN MKR DOCD: CPT | Performed by: FAMILY MEDICINE

## 2024-01-30 NOTE — PROGRESS NOTES
(Family Medicine)  Tracy Zamudio APRN - NP as PCP - Empaneled Provider  Shiraz Diaz MD as Physician  Edy Raza MD as Physician  ANAT Sherwood MD as Physician     Reviewed and updated this visit:  Tobacco  Allergies  Meds  Problems  Med Hx  Surg Hx  Soc Hx  Fam Hx

## 2024-01-30 NOTE — PATIENT INSTRUCTIONS
changes become habit, add a few more changes.  If you don't think you're ready to make changes right now, try to pick a date in the future. Make an appointment to see your doctor to discuss whether the time is right for you to start a plan.  Follow-up care is a key part of your treatment and safety. Be sure to make and go to all appointments, and call your doctor if you are having problems. It's also a good idea to know your test results and keep a list of the medicines you take.  How can you care for yourself at home?  Set realistic goals. Many people expect to lose much more weight than is likely. A weight loss of 5% to 10% of your body weight may be enough to improve your health.  Get family and friends involved to provide support. Talk to them about why you are trying to lose weight, and ask them to help. They can help by participating in exercise and having meals with you, even if they may be eating something different.  Find what works best for you. If you do not have time or do not like to cook, a program that offers meal replacement bars or shakes may be better for you. Or if you like to prepare meals, finding a plan that includes daily menus and recipes may be best.  Ask your doctor about other health professionals who can help you achieve your weight loss goals.  A dietitian can help you make healthy changes in your diet.  An exercise specialist or  can help you develop a safe and effective exercise program.  A counselor or psychiatrist can help you cope with issues such as depression, anxiety, or family problems that can make it hard to focus on weight loss.  Consider joining a support group for people who are trying to lose weight. Your doctor can suggest groups in your area.  Where can you learn more?  Go to https://www.healthwise.net/patientEd and enter U357 to learn more about \"Starting a Weight Loss Plan: Care Instructions.\"  Current as of: September 20, 2023               Content

## 2024-01-30 NOTE — PROGRESS NOTES
or pleasure in doing things Not at all    Feeling down, depressed, or hopeless Not at all    Calculation of PHQ 2 values (range: 0 - 6) 0          [facility] Social Determinants Of Health (Sdoh) Screening Questionnaire       Question 2024 10:32 AM EST - Filed by Patient    How hard is it for you to pay for the very basics like food, housing, medical care, and heating? Not hard at all    Within the past 12 months, you worried that your food would run out before you got the money to buy more. Never true    Within the past 12 months, the food you bought just didn’t last and you didn’t have money to get more. Never true    In the past 12 months, has lack of transportation kept you from meetings, work, or from getting things needed for daily living? No    In the last 12 months, was there a time when you did not have a steady place to sleep or slept in a shelter (including now)? No    Would you like resources for any of these topics? No, thank you           PHQ-9 Total Score: 0 (2024 10:30 AM)       Identified Patient with 2 patient identifiers-Name and

## 2024-01-31 NOTE — PROGRESS NOTES
0            Shiraz Diaz MD      [address],[facility],[place] [id number]      Office [phone],Fax [phone]         Dedyg Urena is a 73 y.o.  female is here for f/u visit .      Primary care physician:   Tracy Zamudio NP         Chief complaint:      As documented in EMR      Assessment:      Hx of PVC/nonsustained VT - was on Mexiletine.  It was discontinued because Holter monitor did not show any PVCs/significant abnormal rhythms.  E cardio event monitor after stopping mexiletine (2018)-sinus rhythm/1 PVC/no significant arrhythmias   Hx of non ischemic cardiomyopathy -resolved ;normal EF on ECHO 11/2014 ; 10/2016 ; 11/8/17   Hx of orthostasis/dizziness doing well   HTN   HLD   Chronic fatigue   History of idiopathic peripheral neuropathy-numbness/tingling both lower extremities, history of \"cold sensation\" in both feet towards the end of the day.   Bilateral leg cramps   Intolerance to statins-myalgia  Chronic severe intermittent cough-has been followed by pulmonary and scheduled to get further testing.  (Not on ACE inhibitor)      Plan:      Echocardiogram   Tolerating Repatha-LDL 1/2023-47   BP controlled   Followed by Dr. Raza-endocrinologist    Aggressive cardiovascular risk factor modification.  Advised to lose weight.   Follow-up in 12 months or earlier as needed         Patient understands the plan. All questions were answered to the patient's satisfaction.      Medication Side Effects and Warnings were discussed with patient: yes   Patient Labs were reviewed and or requested:  yes   Patient Past Records were reviewed and or requested: yes      I appreciate the opportunity to be involved in . See note below for details. Please do not hesitate to contact us with questions  or concerns.      Shiraz Diaz MD      Cardiac Testing/ Procedures:      A.Cardiac Cath/PCI:      B.ECHO/YONG:     3/12/20 -EF 55-60%/Grade 1 DD/Trace MR   11/8/17 - EF

## 2024-02-01 ENCOUNTER — OFFICE VISIT (OUTPATIENT)
Age: 74
End: 2024-02-01
Payer: MEDICARE

## 2024-02-01 VITALS
OXYGEN SATURATION: 97 % | HEART RATE: 70 BPM | BODY MASS INDEX: 30.39 KG/M2 | HEIGHT: 64 IN | SYSTOLIC BLOOD PRESSURE: 124 MMHG | WEIGHT: 178 LBS | DIASTOLIC BLOOD PRESSURE: 70 MMHG

## 2024-02-01 DIAGNOSIS — I42.8 OTHER CARDIOMYOPATHIES (HCC): Primary | ICD-10-CM

## 2024-02-01 DIAGNOSIS — I10 ESSENTIAL (PRIMARY) HYPERTENSION: ICD-10-CM

## 2024-02-01 DIAGNOSIS — G62.89 PERIPHERAL NEUROPATHY DUE TO ISCHEMIA: ICD-10-CM

## 2024-02-01 DIAGNOSIS — E78.2 MIXED HYPERLIPIDEMIA: ICD-10-CM

## 2024-02-01 PROCEDURE — 3074F SYST BP LT 130 MM HG: CPT | Performed by: INTERNAL MEDICINE

## 2024-02-01 PROCEDURE — G8417 CALC BMI ABV UP PARAM F/U: HCPCS | Performed by: INTERNAL MEDICINE

## 2024-02-01 PROCEDURE — G8427 DOCREV CUR MEDS BY ELIG CLIN: HCPCS | Performed by: INTERNAL MEDICINE

## 2024-02-01 PROCEDURE — 99214 OFFICE O/P EST MOD 30 MIN: CPT | Performed by: INTERNAL MEDICINE

## 2024-02-01 PROCEDURE — 1036F TOBACCO NON-USER: CPT | Performed by: INTERNAL MEDICINE

## 2024-02-01 PROCEDURE — 1090F PRES/ABSN URINE INCON ASSESS: CPT | Performed by: INTERNAL MEDICINE

## 2024-02-01 PROCEDURE — 3017F COLORECTAL CA SCREEN DOC REV: CPT | Performed by: INTERNAL MEDICINE

## 2024-02-01 PROCEDURE — G8484 FLU IMMUNIZE NO ADMIN: HCPCS | Performed by: INTERNAL MEDICINE

## 2024-02-01 PROCEDURE — 1123F ACP DISCUSS/DSCN MKR DOCD: CPT | Performed by: INTERNAL MEDICINE

## 2024-02-01 PROCEDURE — G8399 PT W/DXA RESULTS DOCUMENT: HCPCS | Performed by: INTERNAL MEDICINE

## 2024-02-01 PROCEDURE — 3078F DIAST BP <80 MM HG: CPT | Performed by: INTERNAL MEDICINE

## 2024-02-01 NOTE — PROGRESS NOTES
Chief Complaint   Patient presents with    Hypertension    Other     HLD, HX PVC, NSVT    Abnormal Test Results     Vitals:    02/01/24 1006   BP: 124/70   Site: Left Upper Arm   Position: Sitting   Cuff Size: Medium Adult   Pulse: 70   SpO2: 97%   Weight: 80.7 kg (178 lb)   Height: 1.626 m (5' 4\")      /70 (Site: Left Upper Arm, Position: Sitting, Cuff Size: Medium Adult)   Pulse 70   Ht 1.626 m (5' 4\")   Wt 80.7 kg (178 lb)   SpO2 97%   BMI 30.55 kg/m²

## 2024-02-27 ENCOUNTER — ANCILLARY PROCEDURE (OUTPATIENT)
Age: 74
End: 2024-02-27
Payer: MEDICARE

## 2024-02-27 VITALS
HEIGHT: 64 IN | HEART RATE: 75 BPM | BODY MASS INDEX: 30.39 KG/M2 | DIASTOLIC BLOOD PRESSURE: 56 MMHG | WEIGHT: 178 LBS | SYSTOLIC BLOOD PRESSURE: 110 MMHG

## 2024-02-27 DIAGNOSIS — E78.2 MIXED HYPERLIPIDEMIA: ICD-10-CM

## 2024-02-27 DIAGNOSIS — G62.89 PERIPHERAL NEUROPATHY DUE TO ISCHEMIA: ICD-10-CM

## 2024-02-27 DIAGNOSIS — I10 ESSENTIAL (PRIMARY) HYPERTENSION: ICD-10-CM

## 2024-02-27 DIAGNOSIS — I42.8 OTHER CARDIOMYOPATHIES (HCC): ICD-10-CM

## 2024-02-27 PROCEDURE — 93306 TTE W/DOPPLER COMPLETE: CPT | Performed by: INTERNAL MEDICINE

## 2024-03-03 LAB
ECHO AO ASC DIAM: 3.4 CM
ECHO AO ASCENDING AORTA INDEX: 1.83 CM/M2
ECHO AO ROOT DIAM: 3.7 CM
ECHO AO ROOT INDEX: 1.99 CM/M2
ECHO AV AREA PEAK VELOCITY: 2.4 CM2
ECHO AV AREA VTI: 2.5 CM2
ECHO AV AREA/BSA PEAK VELOCITY: 1.3 CM2/M2
ECHO AV AREA/BSA VTI: 1.3 CM2/M2
ECHO AV MEAN GRADIENT: 4 MMHG
ECHO AV MEAN VELOCITY: 1 M/S
ECHO AV PEAK GRADIENT: 8 MMHG
ECHO AV PEAK VELOCITY: 1.4 M/S
ECHO AV VELOCITY RATIO: 0.71
ECHO AV VTI: 26.9 CM
ECHO BSA: 1.91 M2
ECHO EST RA PRESSURE: 3 MMHG
ECHO LA DIAMETER INDEX: 2.31 CM/M2
ECHO LA DIAMETER: 4.3 CM
ECHO LA TO AORTIC ROOT RATIO: 1.16
ECHO LA VOL A-L A2C: 75 ML (ref 22–52)
ECHO LA VOL A-L A4C: 70 ML (ref 22–52)
ECHO LA VOL MOD A2C: 74 ML (ref 22–52)
ECHO LA VOL MOD A4C: 66 ML (ref 22–52)
ECHO LA VOLUME AREA LENGTH: 75 ML
ECHO LA VOLUME INDEX A-L A2C: 40 ML/M2 (ref 16–34)
ECHO LA VOLUME INDEX A-L A4C: 38 ML/M2 (ref 16–34)
ECHO LA VOLUME INDEX AREA LENGTH: 40 ML/M2 (ref 16–34)
ECHO LA VOLUME INDEX MOD A2C: 40 ML/M2 (ref 16–34)
ECHO LA VOLUME INDEX MOD A4C: 35 ML/M2 (ref 16–34)
ECHO LV E' LATERAL VELOCITY: 5 CM/S
ECHO LV E' SEPTAL VELOCITY: 5 CM/S
ECHO LV EDV A2C: 97 ML
ECHO LV EDV A4C: 89 ML
ECHO LV EDV BP: 93 ML (ref 56–104)
ECHO LV EDV INDEX A4C: 48 ML/M2
ECHO LV EDV INDEX BP: 50 ML/M2
ECHO LV EDV NDEX A2C: 52 ML/M2
ECHO LV EJECTION FRACTION A2C: 53 %
ECHO LV EJECTION FRACTION A4C: 59 %
ECHO LV EJECTION FRACTION BIPLANE: 54 % (ref 55–100)
ECHO LV ESV A2C: 46 ML
ECHO LV ESV A4C: 36 ML
ECHO LV ESV BP: 42 ML (ref 19–49)
ECHO LV ESV INDEX A2C: 25 ML/M2
ECHO LV ESV INDEX A4C: 19 ML/M2
ECHO LV ESV INDEX BP: 23 ML/M2
ECHO LV FRACTIONAL SHORTENING: 27 % (ref 28–44)
ECHO LV INTERNAL DIMENSION DIASTOLE INDEX: 2.8 CM/M2
ECHO LV INTERNAL DIMENSION DIASTOLIC: 5.2 CM (ref 3.9–5.3)
ECHO LV INTERNAL DIMENSION SYSTOLIC INDEX: 2.04 CM/M2
ECHO LV INTERNAL DIMENSION SYSTOLIC: 3.8 CM
ECHO LV IVSD: 1.1 CM (ref 0.6–0.9)
ECHO LV MASS 2D: 220.8 G (ref 67–162)
ECHO LV MASS INDEX 2D: 118.7 G/M2 (ref 43–95)
ECHO LV POSTERIOR WALL DIASTOLIC: 1.1 CM (ref 0.6–0.9)
ECHO LV RELATIVE WALL THICKNESS RATIO: 0.42
ECHO LVOT AREA: 3.1 CM2
ECHO LVOT AV VTI INDEX: 0.75
ECHO LVOT DIAM: 2 CM
ECHO LVOT MEAN GRADIENT: 3 MMHG
ECHO LVOT PEAK GRADIENT: 4 MMHG
ECHO LVOT PEAK VELOCITY: 1 M/S
ECHO LVOT STROKE VOLUME INDEX: 33.9 ML/M2
ECHO LVOT SV: 63.1 ML
ECHO LVOT VTI: 20.1 CM
ECHO MV A VELOCITY: 0.94 M/S
ECHO MV AREA PHT: 3.1 CM2
ECHO MV AREA VTI: 2.7 CM2
ECHO MV E DECELERATION TIME (DT): 244.4 MS
ECHO MV E VELOCITY: 0.71 M/S
ECHO MV E/A RATIO: 0.76
ECHO MV E/E' LATERAL: 14.2
ECHO MV E/E' RATIO (AVERAGED): 14.2
ECHO MV LVOT VTI INDEX: 1.18
ECHO MV MAX VELOCITY: 1 M/S
ECHO MV MEAN GRADIENT: 2 MMHG
ECHO MV MEAN VELOCITY: 0.6 M/S
ECHO MV PEAK GRADIENT: 4 MMHG
ECHO MV PRESSURE HALF TIME (PHT): 70.9 MS
ECHO MV VTI: 23.8 CM
ECHO RA AREA 4C: 16.6 CM2
ECHO RA END SYSTOLIC VOLUME APICAL 4 CHAMBER INDEX BSA: 23 ML/M2
ECHO RA VOLUME: 42 ML
ECHO RIGHT VENTRICULAR SYSTOLIC PRESSURE (RVSP): 31 MMHG
ECHO RV INTERNAL DIMENSION: 3.6 CM
ECHO RV TAPSE: 1.8 CM (ref 1.7–?)
ECHO TV REGURGITANT MAX VELOCITY: 2.64 M/S
ECHO TV REGURGITANT PEAK GRADIENT: 28 MMHG

## 2024-03-03 PROCEDURE — 93306 TTE W/DOPPLER COMPLETE: CPT | Performed by: INTERNAL MEDICINE

## 2024-03-12 ENCOUNTER — TELEPHONE (OUTPATIENT)
Age: 74
End: 2024-03-12

## 2024-04-08 NOTE — TELEPHONE ENCOUNTER
Future Appointments  4/8/2024 - 4/8/2026   Date Visit Type Department Provider    2/3/2025  9:00 AM MEDICARE ANNUAL WELL VISIT [facility] Lauren Ware MD   Appointment Notes:   Return in about 1 year (around 1/30/2025) for MWV.

## 2024-04-09 RX ORDER — TRAZODONE HYDROCHLORIDE 50 MG/1
50 TABLET ORAL NIGHTLY PRN
Qty: 90 TABLET | Refills: 3 | Status: SHIPPED | OUTPATIENT
Start: 2024-04-09

## 2024-04-09 RX ORDER — ESCITALOPRAM OXALATE 20 MG/1
20 TABLET ORAL DAILY
Qty: 90 TABLET | Refills: 3 | Status: SHIPPED | OUTPATIENT
Start: 2024-04-09

## 2024-04-19 ENCOUNTER — TELEPHONE (OUTPATIENT)
Age: 74
End: 2024-04-19

## 2024-04-19 DIAGNOSIS — E78.5 HYPERLIPIDEMIA, UNSPECIFIED HYPERLIPIDEMIA TYPE: Primary | ICD-10-CM

## 2024-04-19 DIAGNOSIS — E78.5 HYPERLIPIDEMIA, UNSPECIFIED HYPERLIPIDEMIA TYPE: ICD-10-CM

## 2024-04-19 NOTE — TELEPHONE ENCOUNTER
----- Message from Neeta Urena sent at 4/19/2024 10:58 AM EDT -----  Regarding: Meds  Contact: [phone]  Thank you mail order is good. I have another question. I am currently on Praulent and my  is getting ready to start Leqvio and I was wondering if I could do that one. Medicare will pay for it and the Praulemt is very expensive   Will you ask him for me. Thank you so much.

## 2024-04-29 ENCOUNTER — TELEPHONE (OUTPATIENT)
Age: 74
End: 2024-04-29

## 2024-04-29 NOTE — TELEPHONE ENCOUNTER
Patient is calling to see if we have received her lipid labs from her Thyroid doctor Edy Raza.Please inform patient.    [phone]

## 2024-05-05 LAB
CHOLEST SERPL-MCNC: 169 MG/DL (ref 100–199)
HDLC SERPL-MCNC: 35 MG/DL
IMP & REVIEW OF LAB RESULTS: NORMAL
LDLC SERPL CALC-MCNC: 89 MG/DL (ref 0–99)
TRIGL SERPL-MCNC: 267 MG/DL (ref 0–149)
VLDLC SERPL CALC-MCNC: 45 MG/DL (ref 5–40)

## 2024-05-08 ENCOUNTER — PATIENT MESSAGE (OUTPATIENT)
Age: 74
End: 2024-05-08

## 2024-05-08 ENCOUNTER — OFFICE VISIT (OUTPATIENT)
Age: 74
End: 2024-05-08
Payer: MEDICARE

## 2024-05-08 VITALS
WEIGHT: 180 LBS | HEART RATE: 76 BPM | OXYGEN SATURATION: 97 % | HEIGHT: 62 IN | BODY MASS INDEX: 33.13 KG/M2 | DIASTOLIC BLOOD PRESSURE: 80 MMHG | SYSTOLIC BLOOD PRESSURE: 134 MMHG

## 2024-05-08 DIAGNOSIS — I42.8 OTHER CARDIOMYOPATHIES (HCC): Primary | ICD-10-CM

## 2024-05-08 DIAGNOSIS — E78.5 HYPERLIPIDEMIA, UNSPECIFIED HYPERLIPIDEMIA TYPE: Primary | ICD-10-CM

## 2024-05-08 PROCEDURE — 93010 ELECTROCARDIOGRAM REPORT: CPT

## 2024-05-08 PROCEDURE — 99214 OFFICE O/P EST MOD 30 MIN: CPT

## 2024-05-08 PROCEDURE — 1036F TOBACCO NON-USER: CPT

## 2024-05-08 PROCEDURE — 1090F PRES/ABSN URINE INCON ASSESS: CPT

## 2024-05-08 PROCEDURE — 93005 ELECTROCARDIOGRAM TRACING: CPT

## 2024-05-08 PROCEDURE — 3075F SYST BP GE 130 - 139MM HG: CPT

## 2024-05-08 PROCEDURE — G8417 CALC BMI ABV UP PARAM F/U: HCPCS

## 2024-05-08 PROCEDURE — 3017F COLORECTAL CA SCREEN DOC REV: CPT

## 2024-05-08 PROCEDURE — 3078F DIAST BP <80 MM HG: CPT

## 2024-05-08 PROCEDURE — 1123F ACP DISCUSS/DSCN MKR DOCD: CPT

## 2024-05-08 PROCEDURE — G8427 DOCREV CUR MEDS BY ELIG CLIN: HCPCS

## 2024-05-08 PROCEDURE — G8399 PT W/DXA RESULTS DOCUMENT: HCPCS

## 2024-05-08 RX ORDER — INCLISIRAN 284 MG/1.5ML
284 INJECTION, SOLUTION SUBCUTANEOUS ONCE
Qty: 1.5 ML | Refills: 0
Start: 2024-05-08 | End: 2024-05-08

## 2024-05-08 NOTE — PROGRESS NOTES
had concerns including Cardiomyopathy, Hypertension, and Cholesterol Problem.    Vitals:    05/08/24 1038 05/08/24 1051 05/08/24 1053 05/08/24 1056   BP: 132/68 130/68 136/78 134/80   Site: Right Upper Arm Right Upper Arm Right Upper Arm Right Upper Arm   Position: Sitting Supine Standing Sitting   Pulse: 78 76     SpO2: 97% 97%     Weight: 81.6 kg (180 lb)      Height: 1.575 m (5' 2\")         Dizziness think is due to ear problems    Chest pain No    Refills No        1. Have you been to the ER, urgent care clinic since your last visit? No       Hospitalized since your last visit? No       Where?        Date?  
nosebleeds, tinnitus, and vision changes.   Respiratory: Negative for cough, wheezing  Cardiovascular: Negative for orthopnea, claudication, syncope  Gastrointestinal: Negative for abdominal pain, diarrhea, melena.   Genitourinary: Negative for dysuria  Musculoskeletal: Negative for myalgias.   Skin: Negative for rash  Heme: No problems bleeding.  Neurological: Negative for speech change and focal weakness.         Past Medical History:   Diagnosis Date    Anxiety     Autonomic dysfunction 12/04/1998    tilt test with marked heart rate variablility and drop in BP without hemodynamic collapse    Bladder stone     Cancer (HCC) 1997    left breast lumpectomy , Rx RT    COVID-19 virus infection 04/2022    Diabetes (HCC)     pre-diabetic, NOT ON MEDICATION     Dilated cardiomyopathy (HCC) 02/08/2006    nonischemic    Elevated triglycerides with high cholesterol 11/21/2014    Essential hypertension 02/09/2017    GERD (gastroesophageal reflux disease)     Hx MRSA infection 03/26/2018    L GROIN    Hypothyroidism 1994    Dr. Arnold.    Insulin resistance 11/21/2014    Mixed hyperlipidemia with apolipoprotein E2 variant 11/21/2014    Neuropathy     Osteoarthritis     diffuse. Dr. Newton    Other and unspecified hyperlipidemia 12/21/2010    PONV (postoperative nausea and vomiting)     Sleep apnea     ON CPAP    Urinary incontinence     Vitamin D deficiency 11/21/2014         Past Surgical History:   Procedure Laterality Date    APPENDECTOMY  1969    BREAST LUMPECTOMY Left 1997    lumpectomy    BREAST RECONSTRUCTION Bilateral 09/28/2015    REVISION OF BILATERAL RECONSTRUCTED BREASTS, FAT GRAFTING TO CHEST WALL,  REVISION OF ABDOMINAL SCAR performed by Rivera Rehman MD at [facility] AMBULATORY OR    BREAST SURGERY Bilateral 10/14/2016    REVISION BILATERAL BREAST RECONSTRUCTION / FAT GRAFTING TO CHEST WALL; REVISION OF ABDOMINAL SCAR performed by Rivera Rehman MD at [facility] MAIN OR    BREAST SURGERY Bilateral 12/18/2015

## 2024-05-22 ENCOUNTER — TELEPHONE (OUTPATIENT)
Facility: CLINIC | Age: 74
End: 2024-05-22

## 2024-06-07 ENCOUNTER — TELEMEDICINE (OUTPATIENT)
Facility: CLINIC | Age: 74
End: 2024-06-07
Payer: MEDICARE

## 2024-06-07 DIAGNOSIS — E55.9 VITAMIN D DEFICIENCY: ICD-10-CM

## 2024-06-07 DIAGNOSIS — R73.01 IMPAIRED FASTING GLUCOSE: ICD-10-CM

## 2024-06-07 DIAGNOSIS — R53.82 CHRONIC FATIGUE: ICD-10-CM

## 2024-06-07 DIAGNOSIS — K13.70 MOUTH LESION: Primary | ICD-10-CM

## 2024-06-07 DIAGNOSIS — E53.8 VITAMIN B12 DEFICIENCY: ICD-10-CM

## 2024-06-07 DIAGNOSIS — E03.8 HYPOTHYROIDISM, SECONDARY: ICD-10-CM

## 2024-06-07 PROCEDURE — G8399 PT W/DXA RESULTS DOCUMENT: HCPCS | Performed by: FAMILY MEDICINE

## 2024-06-07 PROCEDURE — 1123F ACP DISCUSS/DSCN MKR DOCD: CPT | Performed by: FAMILY MEDICINE

## 2024-06-07 PROCEDURE — 1036F TOBACCO NON-USER: CPT | Performed by: FAMILY MEDICINE

## 2024-06-07 PROCEDURE — 99214 OFFICE O/P EST MOD 30 MIN: CPT | Performed by: FAMILY MEDICINE

## 2024-06-07 PROCEDURE — G8427 DOCREV CUR MEDS BY ELIG CLIN: HCPCS | Performed by: FAMILY MEDICINE

## 2024-06-07 PROCEDURE — G8417 CALC BMI ABV UP PARAM F/U: HCPCS | Performed by: FAMILY MEDICINE

## 2024-06-07 PROCEDURE — 3017F COLORECTAL CA SCREEN DOC REV: CPT | Performed by: FAMILY MEDICINE

## 2024-06-07 PROCEDURE — 1090F PRES/ABSN URINE INCON ASSESS: CPT | Performed by: FAMILY MEDICINE

## 2024-06-07 ASSESSMENT — ANXIETY QUESTIONNAIRES
IF YOU CHECKED OFF ANY PROBLEMS ON THIS QUESTIONNAIRE, HOW DIFFICULT HAVE THESE PROBLEMS MADE IT FOR YOU TO DO YOUR WORK, TAKE CARE OF THINGS AT HOME, OR GET ALONG WITH OTHER PEOPLE: NOT DIFFICULT AT ALL
2. NOT BEING ABLE TO STOP OR CONTROL WORRYING: NEARLY EVERY DAY
7. FEELING AFRAID AS IF SOMETHING AWFUL MIGHT HAPPEN: NOT AT ALL
4. TROUBLE RELAXING: NEARLY EVERY DAY
5. BEING SO RESTLESS THAT IT IS HARD TO SIT STILL: NOT AT ALL
3. WORRYING TOO MUCH ABOUT DIFFERENT THINGS: MORE THAN HALF THE DAYS
GAD7 TOTAL SCORE: 13
6. BECOMING EASILY ANNOYED OR IRRITABLE: MORE THAN HALF THE DAYS
1. FEELING NERVOUS, ANXIOUS, OR ON EDGE: NEARLY EVERY DAY

## 2024-06-07 ASSESSMENT — PATIENT HEALTH QUESTIONNAIRE - PHQ9
SUM OF ALL RESPONSES TO PHQ QUESTIONS 1-9: 1
SUM OF ALL RESPONSES TO PHQ QUESTIONS 1-9: 1
2. FEELING DOWN, DEPRESSED OR HOPELESS: NOT AT ALL
SUM OF ALL RESPONSES TO PHQ QUESTIONS 1-9: 1
1. LITTLE INTEREST OR PLEASURE IN DOING THINGS: SEVERAL DAYS
SUM OF ALL RESPONSES TO PHQ9 QUESTIONS 1 & 2: 1
SUM OF ALL RESPONSES TO PHQ QUESTIONS 1-9: 1

## 2024-06-07 NOTE — PROGRESS NOTES
Chief Complaint   Patient presents with    Anxiety    Mouth Lesions       \"Have you been to the ER, urgent care clinic since your last visit?  Hospitalized since your last visit?\"    YES - When: approximately 1 months ago.  Where and Why: UC for mouth lesions.    “Have you seen or consulted any other health care providers outside of [facility] since your last visit?”    YES - When: approximately 1 months ago.  Where and Why: Dentist for mouth lesions.            Click Here for Release of Records Request    PHQ-9 Total Score: 1 (6/7/2024  1:07 PM)           6/7/2024     1:07 PM   BHASKAR-7 SCREENING   Feeling nervous, anxious, or on edge Nearly every day   Not being able to stop or control worrying Nearly every day   Worrying too much about different things More than half the days   Trouble relaxing Nearly every day   Being so restless that it is hard to sit still Not at all   Becoming easily annoyed or irritable More than half the days   Feeling afraid as if something awful might happen Not at all   BHASKAR-7 Total Score 13   How difficult have these problems made it for you to do your work, take care of things at home, or get along with other people? Not difficult at all      PLEASE SEND LINK [phone]

## 2024-06-07 NOTE — PROGRESS NOTES
Neeta Urena is a 74 y.o. female who was seen by synchronous (real-time) audio-video technology.     Consent:  Patient and/or their healthcare decision maker is aware that this patient-initiated Telehealth encounter is a billable service, with coverage as determined by their insurance carrier. They are aware that they may receive a bill and have provided verbal consent to proceed: Yes    I was at home while conducting this encounter.    Platform: [employer]    HPI: Pt is a 74 y.o. female who presents for follow-up.    Anxiety: She saw a Neurologist and they told her she was under high levels of stress. She is already taking Lexapro and thinks this feels different. She is not interested in changing her medication regimen currently but would like to get labs checked to look at her hormone levels and look into causes of fatigue.     Mouth sores: She has had these on and off for the past 3 months. They will come and go. It makes brushing her teeth very difficult. She saw her dentist and he gave her some mouthwash and then a course of oral prednisone which didn't help. She has also tried topical steroids which didn't help. The dentist thought the lesions on her tongue were canker sores but the ones on her inner lower lips she describes as raised and white.       Past Medical History:   Diagnosis Date    Anxiety     Autonomic dysfunction 12/04/1998    tilt test with marked heart rate variablility and drop in BP without hemodynamic collapse    Bladder stone     Cancer (HCC) 1997    left breast lumpectomy , Rx RT    COVID-19 virus infection 04/2022    Diabetes (HCC)     pre-diabetic, NOT ON MEDICATION     Dilated cardiomyopathy (HCC) 02/08/2006    nonischemic    Elevated triglycerides with high cholesterol 11/21/2014    Essential hypertension 02/09/2017    GERD (gastroesophageal reflux disease)     Hx MRSA infection 03/26/2018    L GROIN    Hypothyroidism 1994    Dr. Arnold.    Insulin resistance 11/21/2014    Mixed

## 2024-06-10 ENCOUNTER — TELEPHONE (OUTPATIENT)
Facility: CLINIC | Age: 74
End: 2024-06-10

## 2024-06-10 RX ORDER — CYANOCOBALAMIN 1000 UG/ML
INJECTION, SOLUTION INTRAMUSCULAR; SUBCUTANEOUS
Qty: 6 ML | Refills: 0 | Status: SHIPPED | OUTPATIENT
Start: 2024-06-10

## 2024-06-10 NOTE — TELEPHONE ENCOUNTER
I looked online to try to find out who participates with Medicare and I could not find any options.  Advised patient to contact her insurance company to find out who they participate with and that should narrow down her list so she can contact them and get scheduled.

## 2024-06-10 NOTE — TELEPHONE ENCOUNTER
Patient called stating she needs to find an oral surgeon. She stated she has called 6 places and all of them did not accept Medicare. She would like to know if [facility] knows of any places that do accept Medicare so she can get a biopsy done. Please call patient at [phone].   Quality 431: Preventive Care And Screening: Unhealthy Alcohol Use - Screening: Patient not identified as an unhealthy alcohol user when screened for unhealthy alcohol use using a systematic screening method Quality 110: Preventive Care And Screening: Influenza Immunization: Influenza Immunization previously received during influenza season Detail Level: Detailed Quality 226: Preventive Care And Screening: Tobacco Use: Screening And Cessation Intervention: Patient screened for tobacco use and is an ex/non-smoker Quality 130: Documentation Of Current Medications In The Medical Record: Current Medications Documented

## 2024-06-11 ENCOUNTER — NURSE ONLY (OUTPATIENT)
Facility: CLINIC | Age: 74
End: 2024-06-11

## 2024-06-12 LAB
25(OH)D3+25(OH)D2 SERPL-MCNC: 63.5 NG/ML (ref 30–100)
ALBUMIN SERPL-MCNC: 4.4 G/DL (ref 3.8–4.8)
ALBUMIN/GLOB SERPL: 2 {RATIO}
ALP SERPL-CCNC: 44 IU/L (ref 44–121)
ALT SERPL-CCNC: 15 IU/L (ref 0–32)
AST SERPL-CCNC: 16 IU/L (ref 0–40)
BASOPHILS # BLD AUTO: 0.1 X10E3/UL (ref 0–0.2)
BASOPHILS NFR BLD AUTO: 1 %
BILIRUB SERPL-MCNC: 0.2 MG/DL (ref 0–1.2)
BUN SERPL-MCNC: 14 MG/DL (ref 8–27)
BUN/CREAT SERPL: 23 (ref 12–28)
CALCIUM SERPL-MCNC: 9.6 MG/DL (ref 8.7–10.3)
CHLORIDE SERPL-SCNC: 105 MMOL/L (ref 96–106)
CO2 SERPL-SCNC: 22 MMOL/L (ref 20–29)
CREAT SERPL-MCNC: 0.61 MG/DL (ref 0.57–1)
EGFRCR SERPLBLD CKD-EPI 2021: 94 ML/MIN/1.73
EOSINOPHIL # BLD AUTO: 0.2 X10E3/UL (ref 0–0.4)
EOSINOPHIL NFR BLD AUTO: 3 %
ERYTHROCYTE [DISTWIDTH] IN BLOOD BY AUTOMATED COUNT: 12.9 % (ref 11.7–15.4)
FOLATE SERPL-MCNC: 11.8 NG/ML
GLOBULIN SER CALC-MCNC: 2.2 G/DL (ref 1.5–4.5)
GLUCOSE SERPL-MCNC: 105 MG/DL (ref 70–99)
HBA1C MFR BLD: 5.5 % (ref 4.8–5.6)
HCT VFR BLD AUTO: 37.7 % (ref 34–46.6)
HGB BLD-MCNC: 12.3 G/DL (ref 11.1–15.9)
IMM GRANULOCYTES # BLD AUTO: 0.1 X10E3/UL (ref 0–0.1)
IMM GRANULOCYTES NFR BLD AUTO: 1 %
LYMPHOCYTES # BLD AUTO: 1.9 X10E3/UL (ref 0.7–3.1)
LYMPHOCYTES NFR BLD AUTO: 28 %
MCH RBC QN AUTO: 29.3 PG (ref 26.6–33)
MCHC RBC AUTO-ENTMCNC: 32.6 G/DL (ref 31.5–35.7)
MCV RBC AUTO: 90 FL (ref 79–97)
MONOCYTES # BLD AUTO: 0.7 X10E3/UL (ref 0.1–0.9)
MONOCYTES NFR BLD AUTO: 10 %
NEUTROPHILS # BLD AUTO: 3.8 X10E3/UL (ref 1.4–7)
NEUTROPHILS NFR BLD AUTO: 57 %
PLATELET # BLD AUTO: 301 X10E3/UL (ref 150–450)
POTASSIUM SERPL-SCNC: 4.2 MMOL/L (ref 3.5–5.2)
PROT SERPL-MCNC: 6.6 G/DL (ref 6–8.5)
RBC # BLD AUTO: 4.2 X10E6/UL (ref 3.77–5.28)
SODIUM SERPL-SCNC: 140 MMOL/L (ref 134–144)
TSH SERPL DL<=0.005 MIU/L-ACNC: 0.32 UIU/ML (ref 0.45–4.5)
VIT B12 SERPL-MCNC: 498 PG/ML (ref 232–1245)
WBC # BLD AUTO: 6.7 X10E3/UL (ref 3.4–10.8)

## 2024-06-13 DIAGNOSIS — E03.8 HYPOTHYROIDISM, SECONDARY: Primary | ICD-10-CM

## 2024-06-13 LAB — CORTIS AM PEAK SERPL-MCNC: 15.4 UG/DL (ref 6.2–19.4)

## 2024-06-13 RX ORDER — LEVOTHYROXINE SODIUM 0.1 MG/1
100 TABLET ORAL
Qty: 90 TABLET | Refills: 0 | Status: SHIPPED | OUTPATIENT
Start: 2024-06-13

## 2024-06-27 NOTE — PROGRESS NOTES
Labs reviewed from 4/15/24   TSH 0.35, Na 144, BUN 19, Cr 0.61, K 4.6, AST 21, ALT 19  , HDL 35, LDL 18,   A1c 5.6%  [facility]  [address],[facility],[place] [address]  Office [phone],Fax [phone]                                                                                                                                       Primary Cardiologist:  Shiraz Diaz MD  Last Office Visit: 5/8/24         Assessment  Today - complains of palpitations    Hx of PVC/nonsustained VT - was on Mexiletine.  It was discontinued because Holter monitor did not show any PVCs/significant abnormal rhythms.  E cardio event monitor after stopping mexiletine (2018)-sinus rhythm/1 PVC/no significant arrhythmias   Hx of non ischemic cardiomyopathy -resolved ;normal EF on ECHO 11/2014 ; 10/2016 ; 11/8/17   Hx of orthostasis/dizziness doing well   HTN   HLD   Chronic fatigue   History of idiopathic peripheral neuropathy-numbness/tingling both lower extremities, history of \"cold sensation\" in both feet towards the end of the day.   Bilateral leg cramps   Intolerance to statins-myalgia  Chronic severe intermittent cough-has been followed by pulmonary and scheduled to get further testing.  (Not on ACE inhibitor)     Plan  Echocardiogram - from 3/2024 - EF 54%, abnormal diastolic function  Will check 14 day event monitor for palpitations   Tolerating Praluent -LDL 1/2023-47   BP controlled - occasionally elevated numbers. She will continue to monitor.    Followed by Dr. Raza-endocrinologist    Aggressive cardiovascular risk factor modification.  Advised to lose weight.     Follow-up in 4 months with Dr. Diaz  I appreciate the opportunity to be involved in Dedious A Urena care. Please do not hesitate to contact us with questions or concerns.       Labs reviewed from PCP 4/15/24 - , HDL 35, LDL 18, , A1c 5.6%  Labs 5/5/24 - , , HDL 35, LDL 89

## 2024-06-28 ENCOUNTER — ANCILLARY PROCEDURE (OUTPATIENT)
Age: 74
End: 2024-06-28
Payer: MEDICARE

## 2024-06-28 ENCOUNTER — TELEPHONE (OUTPATIENT)
Facility: CLINIC | Age: 74
End: 2024-06-28

## 2024-06-28 ENCOUNTER — OFFICE VISIT (OUTPATIENT)
Age: 74
End: 2024-06-28
Payer: MEDICARE

## 2024-06-28 VITALS
HEIGHT: 64 IN | HEART RATE: 92 BPM | BODY MASS INDEX: 31.92 KG/M2 | DIASTOLIC BLOOD PRESSURE: 80 MMHG | WEIGHT: 187 LBS | SYSTOLIC BLOOD PRESSURE: 139 MMHG

## 2024-06-28 DIAGNOSIS — R00.2 PALPITATION: ICD-10-CM

## 2024-06-28 DIAGNOSIS — E78.5 HYPERLIPIDEMIA, UNSPECIFIED HYPERLIPIDEMIA TYPE: Primary | ICD-10-CM

## 2024-06-28 DIAGNOSIS — I10 ESSENTIAL (PRIMARY) HYPERTENSION: ICD-10-CM

## 2024-06-28 DIAGNOSIS — I49.3 PVC (PREMATURE VENTRICULAR CONTRACTION): ICD-10-CM

## 2024-06-28 DIAGNOSIS — E78.5 HYPERLIPIDEMIA, UNSPECIFIED HYPERLIPIDEMIA TYPE: ICD-10-CM

## 2024-06-28 PROCEDURE — 3079F DIAST BP 80-89 MM HG: CPT

## 2024-06-28 PROCEDURE — 93246 EXT ECG>7D<15D RECORDING: CPT | Performed by: INTERNAL MEDICINE

## 2024-06-28 PROCEDURE — 3075F SYST BP GE 130 - 139MM HG: CPT

## 2024-06-28 PROCEDURE — 99214 OFFICE O/P EST MOD 30 MIN: CPT

## 2024-06-28 PROCEDURE — 1036F TOBACCO NON-USER: CPT

## 2024-06-28 PROCEDURE — G8417 CALC BMI ABV UP PARAM F/U: HCPCS

## 2024-06-28 PROCEDURE — G8399 PT W/DXA RESULTS DOCUMENT: HCPCS

## 2024-06-28 PROCEDURE — 1123F ACP DISCUSS/DSCN MKR DOCD: CPT

## 2024-06-28 PROCEDURE — G8427 DOCREV CUR MEDS BY ELIG CLIN: HCPCS

## 2024-06-28 PROCEDURE — 1090F PRES/ABSN URINE INCON ASSESS: CPT

## 2024-06-28 PROCEDURE — 3017F COLORECTAL CA SCREEN DOC REV: CPT

## 2024-06-28 RX ORDER — FENOFIBRATE 145 MG/1
145 TABLET, COATED ORAL DAILY
Qty: 90 TABLET | Refills: 3 | Status: SHIPPED | OUTPATIENT
Start: 2024-06-28

## 2024-06-28 NOTE — PROGRESS NOTES
/80   Pulse 92   Ht 1.626 m (5' 4\")   Wt 84.8 kg (187 lb)   BMI 32.10 kg/m²     Pt c/o FUCHS and elevated BP since last OV. Also has noticed her hands temors.

## 2024-06-28 NOTE — TELEPHONE ENCOUNTER
I called patient to verify a fax we keep receiving from [employer]. Patient states that she does not know anything about these forms. Forms discarded.

## 2024-07-18 DIAGNOSIS — E78.5 HYPERLIPIDEMIA, UNSPECIFIED HYPERLIPIDEMIA TYPE: ICD-10-CM

## 2024-07-18 DIAGNOSIS — E03.8 HYPOTHYROIDISM, SECONDARY: ICD-10-CM

## 2024-07-18 DIAGNOSIS — I10 ESSENTIAL (PRIMARY) HYPERTENSION: ICD-10-CM

## 2024-07-18 RX ORDER — LEVOTHYROXINE SODIUM 100 UG/1
100 TABLET ORAL
Qty: 90 TABLET | Refills: 0 | Status: SHIPPED | OUTPATIENT
Start: 2024-07-18

## 2024-07-19 RX ORDER — METOPROLOL SUCCINATE 25 MG/1
25 TABLET, EXTENDED RELEASE ORAL DAILY
Qty: 90 TABLET | Refills: 3 | Status: SHIPPED | OUTPATIENT
Start: 2024-07-19

## 2024-07-24 DIAGNOSIS — E78.5 HYPERLIPIDEMIA, UNSPECIFIED HYPERLIPIDEMIA TYPE: ICD-10-CM

## 2024-07-24 DIAGNOSIS — I10 ESSENTIAL (PRIMARY) HYPERTENSION: ICD-10-CM

## 2024-07-24 RX ORDER — METOPROLOL SUCCINATE 25 MG/1
25 TABLET, EXTENDED RELEASE ORAL DAILY
Qty: 90 TABLET | Refills: 3 | Status: SHIPPED | OUTPATIENT
Start: 2024-07-24

## 2024-07-29 ENCOUNTER — CLINICAL DOCUMENTATION (OUTPATIENT)
Age: 74
End: 2024-07-29

## 2024-07-29 LAB — ECHO BSA: 1.96 M2

## 2024-07-30 ENCOUNTER — TELEPHONE (OUTPATIENT)
Age: 74
End: 2024-07-30

## 2024-07-30 NOTE — PROGRESS NOTES
Patient monitored for 13d 16h, analyzable time was 13d 12h starting on 06/28/2024 03:34 pm.  Primary rhythm was Sinus Rhythm. Average heart rate was 81 bpm, Minimum heart rate was 54 bpm on Day 5 / 05:08:55 am, Max heart rate was 153 bpm on Day 13 / 06:14:20 pm  SVE(s): Enterprise was 0.1 %, 1624 total SVE(s)  SV Arrhythmia(s): 15 event(s), longest event 11 beats on Day 8 / 09:42:45 pm, fastest event 153 bpm on Day 13 /06:14:25 pm  AV Block: 0.03 %, most severe block demonstrated 2nd degree, Mobitz I  PVC(s): Enterprise was < 0.01 %, 152 total PVC(s), 3 disparate morphologies       PSVT - 15 beat run 153bpm  Episodes of  2nd deg AV block Mobitz type 1 - transient    On B blockers    Next appt 9/13/24    Will make referral to EP

## 2024-07-30 NOTE — TELEPHONE ENCOUNTER
----- Message from DOROTHY Valenzuela NP sent at 7/30/2024  8:38 AM EDT -----  Not sure if Dr. DC sent this to you or not.     Can you please call patient with results of holter monitor below. She had PSVT and he wanted her to see EP   Thanks!  Ingris   ----- Message -----  From: Shiraz Diaz MD  Sent: 7/29/2024   9:29 PM EDT  To: DOROTHY Valenzuela NP

## 2024-09-30 ENCOUNTER — TELEPHONE (OUTPATIENT)
Age: 74
End: 2024-09-30

## 2024-09-30 DIAGNOSIS — E78.5 HYPERLIPIDEMIA, UNSPECIFIED HYPERLIPIDEMIA TYPE: ICD-10-CM

## 2024-09-30 DIAGNOSIS — E78.5 HYPERLIPIDEMIA, UNSPECIFIED HYPERLIPIDEMIA TYPE: Primary | ICD-10-CM

## 2024-09-30 RX ORDER — ALIROCUMAB 150 MG/ML
INJECTION, SOLUTION SUBCUTANEOUS
Qty: 2 ML | Refills: 3 | Status: SHIPPED | OUTPATIENT
Start: 2024-09-30 | End: 2024-09-30 | Stop reason: SDUPTHER

## 2024-09-30 RX ORDER — ALIROCUMAB 150 MG/ML
1 INJECTION, SOLUTION SUBCUTANEOUS
Qty: 6 ML | Refills: 3 | Status: SHIPPED | OUTPATIENT
Start: 2024-09-30 | End: 2024-09-30 | Stop reason: ALTCHOICE

## 2024-09-30 RX ORDER — ALIROCUMAB 150 MG/ML
1 INJECTION, SOLUTION SUBCUTANEOUS
Qty: 6 ML | Refills: 3 | Status: SHIPPED | OUTPATIENT
Start: 2024-09-30

## 2024-09-30 NOTE — TELEPHONE ENCOUNTER
Confirmed with pharmacy prescription was received.  States medication is not kept on hand, will have to order.  ETA tomorrow, they will notify patient when ready for pickup.  Notified patient, understanding expressed.

## 2024-09-30 NOTE — TELEPHONE ENCOUNTER
Patient went to the pharmacy to  refill for praluent and was told it was discontinued, patient would like to know why as she was not aware.      Pt# [phone]

## 2024-10-11 ENCOUNTER — OFFICE VISIT (OUTPATIENT)
Age: 74
End: 2024-10-11
Payer: MEDICARE

## 2024-10-11 VITALS
WEIGHT: 184.4 LBS | HEIGHT: 62 IN | BODY MASS INDEX: 33.93 KG/M2 | DIASTOLIC BLOOD PRESSURE: 80 MMHG | HEART RATE: 79 BPM | RESPIRATION RATE: 18 BRPM | OXYGEN SATURATION: 96 % | SYSTOLIC BLOOD PRESSURE: 150 MMHG

## 2024-10-11 DIAGNOSIS — Z86.79 HX OF CARDIOMYOPATHY: ICD-10-CM

## 2024-10-11 DIAGNOSIS — G47.33 OSA (OBSTRUCTIVE SLEEP APNEA): ICD-10-CM

## 2024-10-11 DIAGNOSIS — I47.10 SVT (SUPRAVENTRICULAR TACHYCARDIA) (HCC): ICD-10-CM

## 2024-10-11 DIAGNOSIS — I49.3 PVC (PREMATURE VENTRICULAR CONTRACTION): Primary | ICD-10-CM

## 2024-10-11 DIAGNOSIS — I10 ESSENTIAL HYPERTENSION: ICD-10-CM

## 2024-10-11 PROCEDURE — 99214 OFFICE O/P EST MOD 30 MIN: CPT | Performed by: HOSPITALIST

## 2024-10-11 PROCEDURE — 3017F COLORECTAL CA SCREEN DOC REV: CPT | Performed by: HOSPITALIST

## 2024-10-11 PROCEDURE — 3077F SYST BP >= 140 MM HG: CPT | Performed by: HOSPITALIST

## 2024-10-11 PROCEDURE — 3079F DIAST BP 80-89 MM HG: CPT | Performed by: HOSPITALIST

## 2024-10-11 PROCEDURE — 93010 ELECTROCARDIOGRAM REPORT: CPT | Performed by: HOSPITALIST

## 2024-10-11 PROCEDURE — 93005 ELECTROCARDIOGRAM TRACING: CPT | Performed by: HOSPITALIST

## 2024-10-11 PROCEDURE — 1090F PRES/ABSN URINE INCON ASSESS: CPT | Performed by: HOSPITALIST

## 2024-10-11 PROCEDURE — G8484 FLU IMMUNIZE NO ADMIN: HCPCS | Performed by: HOSPITALIST

## 2024-10-11 PROCEDURE — G8399 PT W/DXA RESULTS DOCUMENT: HCPCS | Performed by: HOSPITALIST

## 2024-10-11 PROCEDURE — G8427 DOCREV CUR MEDS BY ELIG CLIN: HCPCS | Performed by: HOSPITALIST

## 2024-10-11 PROCEDURE — G8417 CALC BMI ABV UP PARAM F/U: HCPCS | Performed by: HOSPITALIST

## 2024-10-11 PROCEDURE — 1036F TOBACCO NON-USER: CPT | Performed by: HOSPITALIST

## 2024-10-11 PROCEDURE — 1123F ACP DISCUSS/DSCN MKR DOCD: CPT | Performed by: HOSPITALIST

## 2024-10-11 NOTE — PROGRESS NOTES
Cardiac Electrophysiology OFFICE Consultation Note     Subjective:      Neeta Urena is a pleasant 74 y.o. female.  She is a resident of [facility] [id number].    She goes by Kimberley.    Primary Cardiologist: Shiraz Diaz MD    She is retired. She lives with her .  * is awaiting getting cardiac surgery*.    Neeta Urena presents to electrophysiology clinic for management of Arrhythmia Issues.    Her current medical conditions and PMH are detailed below.    Today's visit:  Date: 10/11/2024     She comes EP clinic today to establish care.   She states that she walks on treadmill 1-2 times a week for about 30 minutes.    Denies exertional symptoms.    She used to have balance issues which improved with Epley maneuvers.    She reports she has spells during the day in which she sweats profusely and heart rate going up. She feels tired. She feels some pounding and some skipped beats. She feels cold later.    She reports several years ago she as on Metoprolol succinate 100 mg a day. She reports she had some lightheadedness. The metoprolol was then decreased from 100 to 25 mg a day.    Denies chest pain. Denies shortness of breath, paroxysmal nocturnal dyspnea, orthopnea and lower extremity edema.        Assessment:       ICD-10-CM    1. PVC (premature ventricular contraction)  I49.3 EKG 12 Lead      2. Essential hypertension  I10       3. Hx of cardiomyopathy  Z86.79       4. WESTON (obstructive sleep apnea)  G47.33             EKG reviewed from 10/11/2024 shows sinus rhythm, heart rate 68 bpm, narrow QRS, normal intervals.  MA is 198 ms.  Normal EKG.    Platform[employer] E patch 14-day monitor from June and July 2024 was reviewed in detail personally by me: It shows predominant rhythm was sinus rhythm with minimum 54 bpm, average 81 beats minute and maximum 153 bpm.  Normal heart rate variation noted during daytime.  No significant PVCs with burden less than 0.01%.  She had few short episodes of SVT with

## 2025-01-03 DIAGNOSIS — E03.8 HYPOTHYROIDISM, SECONDARY: ICD-10-CM

## 2025-01-04 RX ORDER — LEVOTHYROXINE SODIUM 100 UG/1
100 TABLET ORAL
Qty: 90 TABLET | Refills: 0 | Status: SHIPPED | OUTPATIENT
Start: 2025-01-04

## 2025-01-08 DIAGNOSIS — E78.5 HYPERLIPIDEMIA, UNSPECIFIED HYPERLIPIDEMIA TYPE: ICD-10-CM

## 2025-01-08 DIAGNOSIS — I10 ESSENTIAL (PRIMARY) HYPERTENSION: ICD-10-CM

## 2025-01-08 RX ORDER — LOSARTAN POTASSIUM 50 MG/1
50 TABLET ORAL DAILY
Qty: 90 TABLET | Refills: 1 | Status: SHIPPED | OUTPATIENT
Start: 2025-01-08 | End: 2025-01-10 | Stop reason: SDUPTHER

## 2025-01-10 DIAGNOSIS — E78.5 HYPERLIPIDEMIA, UNSPECIFIED HYPERLIPIDEMIA TYPE: ICD-10-CM

## 2025-01-10 DIAGNOSIS — I10 ESSENTIAL (PRIMARY) HYPERTENSION: ICD-10-CM

## 2025-01-10 RX ORDER — LOSARTAN POTASSIUM 50 MG/1
50 TABLET ORAL DAILY
Qty: 90 TABLET | Refills: 3 | Status: SHIPPED | OUTPATIENT
Start: 2025-01-10

## (undated) DEVICE — STERILE POLYISOPRENE POWDER-FREE SURGICAL GLOVES WITH EMOLLIENT COATING: Brand: PROTEXIS

## (undated) DEVICE — SUTURE STRATAFIX SYMMETRIC PDS + SZ 1 L18IN ABSRB VLT L48MM SXPP1A400

## (undated) DEVICE — CONTAINER,SPECIMEN,3OZ,OR STRL: Brand: MEDLINE

## (undated) DEVICE — 4-PORT MANIFOLD: Brand: NEPTUNE 2

## (undated) DEVICE — PREP SKN CHLRAPRP APL 26ML STR --

## (undated) DEVICE — SUTURE VCRL SZ 2-0 L36IN ABSRB UD L40MM CT 1/2 CIR J957H

## (undated) DEVICE — CARTRIDGE BNE CEM MIX UNIV TWR VAC ROTOR BRK OFF NOZ W/O

## (undated) DEVICE — TOTAL TRAY, 16FR 10ML SIL FOLEY, URN: Brand: MEDLINE

## (undated) DEVICE — INFECTION CONTROL KIT SYS

## (undated) DEVICE — SOLUTION IRRIG 3000ML 0.9% SOD CHL USP UROMATIC PLAS CONT

## (undated) DEVICE — (D)PREP SKN CHLRAPRP APPL 26ML -- CONVERT TO ITEM 371833

## (undated) DEVICE — BLADE SAW W083XL354IN THK0047IN CUT THK0047IN SAG FLR

## (undated) DEVICE — SOLUTION IRRIG 1000ML STRL H2O USP PLAS POUR BTL

## (undated) DEVICE — GLOVE ORTHO 7   MSG9470

## (undated) DEVICE — HYPODERMIC SAFETY NEEDLE: Brand: MAGELLAN

## (undated) DEVICE — BANDAGE COMPR M W6INXL10YD WHT BGE VELC E MTRX HK AND LOOP

## (undated) DEVICE — SLIM BODY SKIN STAPLER: Brand: APPOSE ULC

## (undated) DEVICE — HOOK LOCK LATEX FREE ELASTIC BANDAGE D/L 6INX10YD

## (undated) DEVICE — SCRUB DRY SURG EZ SCRUB BRUSH PREOPERATIVE GRN

## (undated) DEVICE — ZIMMER® STERILE DISPOSABLE TOURNIQUET CUFF WITH PLC, DUAL PORT, SINGLE BLADDER, 34 IN. (86 CM)

## (undated) DEVICE — PADDING CST 6IN STERILE --

## (undated) DEVICE — SUTURE VCRL SZ 2 L54IN ABSRB UD L65MM TP-1 1/2 CIR J880T

## (undated) DEVICE — NEEDLE HYPO 22GA L1.5IN BLK S STL HUB POLYPR SHLD REG BVL

## (undated) DEVICE — HANDPIECE SET WITH BONE CLEANING TIP AND SUCTION TUBE: Brand: INTERPULSE

## (undated) DEVICE — GLOVE SURG SZ 75 L12IN FNGR THK79MIL GRN LTX FREE

## (undated) DEVICE — DECANTER BAG 9": Brand: MEDLINE INDUSTRIES, INC.

## (undated) DEVICE — TOTAL JOINT - SMH: Brand: MEDLINE INDUSTRIES, INC.

## (undated) DEVICE — GLOVE SURG SZ 65 L12IN FNGR THK94MIL STD WHT LTX FREE

## (undated) DEVICE — T4 HOOD

## (undated) DEVICE — SYR LR LCK 1ML GRAD NSAF 30ML --

## (undated) DEVICE — SYRINGE MED 20ML STD CLR PLAS LUERLOCK TIP N CTRL DISP

## (undated) DEVICE — BLADE SAW W051XL276IN THK005IN CUT THK005IN REPL SAG FLR

## (undated) DEVICE — HEADLESS TROCHAR PIN 75MM: Brand: ZUK

## (undated) DEVICE — Device

## (undated) DEVICE — SOLUTION IRRIG 1000ML H2O STRL BLT

## (undated) DEVICE — TRAY CATH 16F URIN MTR LTX -- CONVERT TO ITEM 363111

## (undated) DEVICE — SYR 20ML LL STRL LF --

## (undated) DEVICE — YANKAUER,FLEXIBLE HANDLE,REGLR CAPACITY: Brand: MEDLINE INDUSTRIES, INC.

## (undated) DEVICE — SOLUTION IRRIG 3000ML 0.9% SOD CHL FLX CONT 0797208] ICU MEDICAL INC]

## (undated) DEVICE — GLOVE ORANGE PI 7 1/2   MSG9075

## (undated) DEVICE — DEVON™ KNEE AND BODY STRAP 60" X 3" (1.5 M X 7.6 CM): Brand: DEVON

## (undated) DEVICE — REM POLYHESIVE ADULT PATIENT RETURN ELECTRODE: Brand: VALLEYLAB